# Patient Record
Sex: FEMALE | Race: WHITE | NOT HISPANIC OR LATINO | Employment: FULL TIME | URBAN - NONMETROPOLITAN AREA
[De-identification: names, ages, dates, MRNs, and addresses within clinical notes are randomized per-mention and may not be internally consistent; named-entity substitution may affect disease eponyms.]

---

## 2017-01-18 ENCOUNTER — TELEPHONE (OUTPATIENT)
Dept: FAMILY MEDICINE | Facility: OTHER | Age: 47
End: 2017-01-18

## 2017-01-18 NOTE — TELEPHONE ENCOUNTER
Panel Management Review      Patient has the following on her problem list: None      Composite cancer screening  Chart review shows that this patient is due/due soon for the following Pap Smear  Summary:    Patient is due/failing the following:   LDL and PAP    Action needed:   Patient needs office visit for physical.    Type of outreach:    Phone, left message for patient to call back.     Questions for provider review:    None                                                                                                                                    Michelle RAMIREZ       Chart routed to Care Team .

## 2017-03-10 ENCOUNTER — OFFICE VISIT (OUTPATIENT)
Dept: FAMILY MEDICINE | Facility: OTHER | Age: 47
End: 2017-03-10

## 2017-03-10 VITALS
TEMPERATURE: 97.8 F | OXYGEN SATURATION: 99 % | SYSTOLIC BLOOD PRESSURE: 124 MMHG | WEIGHT: 212 LBS | RESPIRATION RATE: 12 BRPM | HEART RATE: 84 BPM | DIASTOLIC BLOOD PRESSURE: 84 MMHG | BODY MASS INDEX: 34.22 KG/M2

## 2017-03-10 DIAGNOSIS — T14.8XXA SUPERFICIAL FOREIGN BODY (SLIVER): Primary | ICD-10-CM

## 2017-03-10 PROCEDURE — 99213 OFFICE O/P EST LOW 20 MIN: CPT | Performed by: FAMILY MEDICINE

## 2017-03-10 RX ORDER — CYANOCOBALAMIN 1000 UG/ML
INJECTION, SOLUTION INTRAMUSCULAR; SUBCUTANEOUS
Refills: 3 | COMMUNITY
Start: 2017-02-02

## 2017-03-10 RX ORDER — MONTELUKAST SODIUM 4 MG/1
1 TABLET, CHEWABLE ORAL 2 TIMES DAILY
Refills: 3 | COMMUNITY
Start: 2017-03-07

## 2017-03-10 NOTE — MR AVS SNAPSHOT
After Visit Summary   3/10/2017    Mackenzie Worrell    MRN: [id number]           Patient Information     Date Of Birth          1970        Visit Information        Provider Department      3/10/2017 2:50 PM Aneudy Chaudhry MD [facility]        Today's Diagnoses     Superficial foreign body (sliver)    -  1      Care Instructions      Splinter Removal  A splinter has been removed from under your skin. Although care was taken to remove all pieces present, there is a chance that a small piece may have been left behind.  Very small particles that remain under the skin usually cause no problem and need no further treatment unless an infection develops.  You may receive a tetanus shot if needed. You may be given antibiotics to prevent or treat infection based on many factors. Some of these factors include location, severity of injury, time since injury, and other concerns.  Home care  The following guidelines will help you care for your wound at home:    Keep the injured part elevated during the first 2 days to reduce swelling and pain.    Keep the wound clean and dry. If a bandage was applied and it becomes wet or dirty, replace it. Otherwise, leave it in place for the first 24 hours. Then, clean the wound daily:    After removing the bandage, wash the area with soap and water. Use a wet cotton swab to loosen and remove any blood or crust that forms.    After cleaning, apply a thin layer of antibiotic ointment. Put on a fresh bandage.    Unless told otherwise, you may shower as usual, but do not soak the area in water (no baths or swimming) for at least 1 week.    You may use acetaminophen or ibuprofen for pain, unless another pain medicine was given. If you have chronic liver or kidney disease or ever had a stomach ulcer or GI bleeding, talk with your doctor before using these medicines.  Follow-up care  Follow up with your doctor as advised. Most skin wounds heal within 10 days.  However, there is a risk of infection if there is any particle that is still under the skin. Therefore, check the wound in 2 days for the signs of infection listed below. Any stitches should be removed within 7 to 14 days. If surgical tape closures were used, remove them yourself if they have not fallen off after 7 days.  When to seek medical care  Get prompt medical attention if any of the following occur:    Increasing pain in the wound    Redness, swelling, or pus coming from the wound    Fever of 100.4 F (38 C) or higher, or as directed by your health care provider    6010-6124 [employer]. [address] [place] [address], [place] [address]. All rights reserved. This information is not intended as a substitute for professional medical care. Always follow your healthcare professional's instructions.        Splinter Removal  A splinter has been removed from under your skin. Although care was taken to remove all pieces present, there is a chance that a small piece may have been left behind.  Very small particles that remain under the skin usually cause no problem and need no further treatment unless an infection develops.  You may receive a tetanus shot if needed. You may be given antibiotics to prevent or treat infection based on many factors. Some of these factors include location, severity of injury, time since injury, and other concerns.  Home care  The following guidelines will help you care for your wound at home:    Keep the injured part elevated during the first 2 days to reduce swelling and pain.    Keep the wound clean and dry. If a bandage was applied and it becomes wet or dirty, replace it. Otherwise, leave it in place for the first 24 hours. Then, clean the wound daily:    After removing the bandage, wash the area with soap and water. Use a wet cotton swab to loosen and remove any blood or crust that forms.    After cleaning, apply a thin layer of antibiotic ointment. Put on a fresh  bandage.    Unless told otherwise, you may shower as usual, but do not soak the area in water (no baths or swimming) for at least 1 week.    You may use acetaminophen or ibuprofen for pain, unless another pain medicine was given. If you have chronic liver or kidney disease or ever had a stomach ulcer or GI bleeding, talk with your doctor before using these medicines.  Follow-up care  Follow up with your doctor as advised. Most skin wounds heal within 10 days. However, there is a risk of infection if there is any particle that is still under the skin. Therefore, check the wound in 2 days for the signs of infection listed below. Any stitches should be removed within 7 to 14 days. If surgical tape closures were used, remove them yourself if they have not fallen off after 7 days.  When to seek medical care  Get prompt medical attention if any of the following occur:    Increasing pain in the wound    Redness, swelling, or pus coming from the wound    Fever of 100.4 F (38 C) or higher, or as directed by your health care provider    0687-2167 [employer]. [address] [place] [address]. All rights reserved. This information is not intended as a substitute for professional medical care. Always follow your healthcare professional's instructions.              Follow-ups after your visit        Who to contact     If you have questions or need follow up information about today's clinic visit or your schedule please contact [facility] directly at [phone].  Normal or non-critical lab and imaging results will be communicated to you by MyChart, letter or phone within 4 business days after the clinic has received the results. If you do not hear from us within 7 days, please contact the clinic through MyChart or phone. If you have a critical or abnormal lab result, we will notify you by phone as soon as possible.  Submit refill requests through [email or web address] or call your pharmacy and they will  "forward the refill request to us. Please allow 3 business days for your refill to be completed.          Additional Information About Your Visit        MyChart Information     [employer] lets you send messages to your doctor, view your test results, renew your prescriptions, schedule appointments and more. To sign up, go to [email or web address] . Click on \"Log in\" on the left side of the screen, which will take you to the Welcome page. Then click on \"Sign up Now\" on the right side of the page.     You will be asked to enter the access code listed below, as well as some personal information. Please follow the directions to create your username and password.     Your access code is: [email or web address]  Expires: 2017  3:55 PM     Your access code will  in 90 days. If you need help or a new code, please call your [facility] clinic or [phone].        Care EveryWhere ID     This is your [facility] EveryWhere ID. This could be used by other organizations to access your [facility] medical records  [id number]        Your Vitals Were     Pulse Temperature Respirations Pulse Oximetry Breastfeeding? BMI (Body Mass Index)    84 97.8  F (36.6  C) (Tympanic) 12 99% No 34.22 kg/m2       Blood Pressure from Last 3 Encounters:   03/10/17 124/84   10/17/16 120/78   16 134/89    Weight from Last 3 Encounters:   03/10/17 212 lb (96.2 kg)   10/17/16 209 lb (94.8 kg)   16 209 lb 3.2 oz (94.9 kg)              Today, you had the following     No orders found for display       Primary Care Provider Office Phone # Fax #    Sean Krzysztof Alvarenga [phone]       [address] [place] [address]        Thank you!     Thank you for choosing [facility]  for your care. Our goal is always to provide you with excellent care. Hearing back from our patients is one way we can continue to improve our services. Please take a few minutes to complete the written survey that you may " "receive in the mail after your visit with us. Thank you!             Your Updated Medication List - Protect others around you: Learn how to safely use, store and throw away your medicines at www.disposemymeds.org.          This list is accurate as of: 3/10/17  3:55 PM.  Always use your most recent med list.                   Brand Name Dispense Instructions for use    adalimumab 40 MG/0.8ML pen kit    HUMIRA         albuterol (2.5 MG/3ML) 0.083% neb solution     60 vial    Take 1 vial (2.5 mg) by nebulization every 4 hours as needed for shortness of breath / dyspnea or wheezing       calcium carbonate 500 MG tablet    OS- mg [place]. Ca         colestipol 1 G tablet    COLESTID     Take 1 g by mouth 2 times daily       cyanocobalamin 1000 MCG/ML injection    VITAMIN B12     INJECT 1 ML (1,000 MCG TOTAL) INTRAMUSCULARLY EVERY 2 WEEKS       IBUPROFEN PO      Take 400 mg by mouth       loratadine 10 MG capsule      Take 10 mg by mouth daily seasonally       melatonin 3 MG tablet      Take 3 mg by mouth nightly as needed for sleep       Syringe/Needle (Disp) 25G X 5/8\" 3 ML Misc      For use with B12 inj         "

## 2017-03-10 NOTE — PATIENT INSTRUCTIONS
Splinter Removal  A splinter has been removed from under your skin. Although care was taken to remove all pieces present, there is a chance that a small piece may have been left behind.  Very small particles that remain under the skin usually cause no problem and need no further treatment unless an infection develops.  You may receive a tetanus shot if needed. You may be given antibiotics to prevent or treat infection based on many factors. Some of these factors include location, severity of injury, time since injury, and other concerns.  Home care  The following guidelines will help you care for your wound at home:    Keep the injured part elevated during the first 2 days to reduce swelling and pain.    Keep the wound clean and dry. If a bandage was applied and it becomes wet or dirty, replace it. Otherwise, leave it in place for the first 24 hours. Then, clean the wound daily:    After removing the bandage, wash the area with soap and water. Use a wet cotton swab to loosen and remove any blood or crust that forms.    After cleaning, apply a thin layer of antibiotic ointment. Put on a fresh bandage.    Unless told otherwise, you may shower as usual, but do not soak the area in water (no baths or swimming) for at least 1 week.    You may use acetaminophen or ibuprofen for pain, unless another pain medicine was given. If you have chronic liver or kidney disease or ever had a stomach ulcer or GI bleeding, talk with your doctor before using these medicines.  Follow-up care  Follow up with your doctor as advised. Most skin wounds heal within 10 days. However, there is a risk of infection if there is any particle that is still under the skin. Therefore, check the wound in 2 days for the signs of infection listed below. Any stitches should be removed within 7 to 14 days. If surgical tape closures were used, remove them yourself if they have not fallen off after 7 days.  When to seek medical care  Get prompt medical  attention if any of the following occur:    Increasing pain in the wound    Redness, swelling, or pus coming from the wound    Fever of 100.4 F (38 C) or higher, or as directed by your health care provider    4628-1068 [employer]. [address] [place] [address], [place] [address]. All rights reserved. This information is not intended as a substitute for professional medical care. Always follow your healthcare professional's instructions.        Splinter Removal  A splinter has been removed from under your skin. Although care was taken to remove all pieces present, there is a chance that a small piece may have been left behind.  Very small particles that remain under the skin usually cause no problem and need no further treatment unless an infection develops.  You may receive a tetanus shot if needed. You may be given antibiotics to prevent or treat infection based on many factors. Some of these factors include location, severity of injury, time since injury, and other concerns.  Home care  The following guidelines will help you care for your wound at home:    Keep the injured part elevated during the first 2 days to reduce swelling and pain.    Keep the wound clean and dry. If a bandage was applied and it becomes wet or dirty, replace it. Otherwise, leave it in place for the first 24 hours. Then, clean the wound daily:    After removing the bandage, wash the area with soap and water. Use a wet cotton swab to loosen and remove any blood or crust that forms.    After cleaning, apply a thin layer of antibiotic ointment. Put on a fresh bandage.    Unless told otherwise, you may shower as usual, but do not soak the area in water (no baths or swimming) for at least 1 week.    You may use acetaminophen or ibuprofen for pain, unless another pain medicine was given. If you have chronic liver or kidney disease or ever had a stomach ulcer or GI bleeding, talk with your doctor before using these medicines.  Follow-up  care  Follow up with your doctor as advised. Most skin wounds heal within 10 days. However, there is a risk of infection if there is any particle that is still under the skin. Therefore, check the wound in 2 days for the signs of infection listed below. Any stitches should be removed within 7 to 14 days. If surgical tape closures were used, remove them yourself if they have not fallen off after 7 days.  When to seek medical care  Get prompt medical attention if any of the following occur:    Increasing pain in the wound    Redness, swelling, or pus coming from the wound    Fever of 100.4 F (38 C) or higher, or as directed by your health care provider    3876-9858 [employer]. [address] [place] [address], [place] [address]. All rights reserved. This information is not intended as a substitute for professional medical care. Always follow your healthcare professional's instructions.

## 2017-03-10 NOTE — PROGRESS NOTES
SUBJECTIVE:                                                    Mackenzie Worrell is a 46 year old female who presents to clinic today for the following health issues:    Chief Complaint   Patient presents with     Foreign Body in Skin     right hand sliver 2 months ago that broke off when trying to remove it (she works in a wood shop). As of the last week it has been swollen and painful.           Problem list and histories reviewed & adjusted, as indicated.  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     Hc laparoscopy, surgical; cholecystectomy       Cholecystectomy, Laparoscopic     C resect small intest,singl resec/anas       Resection, Partial Small Bowel     C nonspecific procedure       laparoscopy for infertility workup/ adhesions     Hernia repair, incisional  08/13/08     Incarcerated ventral incisional hernia.     Hc colonoscopy w biopsy  05/18/10     Laparoscopic resection small bowel N/A 3/9/2016     Procedure: LAPAROSCOPIC RESECTION SMALL BOWEL;  Surgeon: Mark Francisco MD;  Location: [facility]     Excise mass ear external Right 5/23/2016     Procedure: EXCISE MASS EAR EXTERNAL;  Surgeon: Asif Akhtar MD;  Location: [facility]       Social History   Substance Use Topics     Smoking status: Current Every Day Smoker     Packs/day: 0.75     Years: 25.00     Types: Cigarettes     Smokeless tobacco:  "Never Used      Comment: 1/2 pack daily, started age 20     Alcohol use 0.0 oz/week     0 Standard drinks or equivalent per week      Comment: rare occasion     Family History   Problem Relation Age of Onset     DIABETES Father      Type II     DIABETES Paternal Grandmother      Type II     EYE* Father      Cataract Surgery, Glaucoma     Anesthesia Reaction No family hx of          Current Outpatient Prescriptions   Medication Sig Dispense Refill     colestipol (COLESTID) 1 G tablet Take 1 g by mouth 2 times daily  3     cyanocobalamin (VITAMIN B12) 1000 MCG/ML injection INJECT 1 ML (1,000 MCG TOTAL) INTRAMUSCULARLY EVERY 2 WEEKS  3     calcium carbonate (OS- MG [place]. [place]) 500 MG tablet        Syringe/Needle, Disp, 25G X 5/8\" 3 ML MISC For use with B12 inj       adalimumab (HUMIRA) 40 MG/0.8ML pen kit        albuterol (2.5 MG/3ML) 0.083% nebulizer solution Take 1 vial (2.5 mg) by nebulization every 4 hours as needed for shortness of breath / dyspnea or wheezing 60 vial 0     IBUPROFEN PO Take 400 mg by mouth       melatonin 3 MG tablet Take 3 mg by mouth nightly as needed for sleep       loratadine 10 MG capsule Take 10 mg by mouth daily seasonally       Allergies   Allergen Reactions     No Known Drug Allergies      Recent Labs   Lab Test  10/17/16   1632 04/08/16 03/11/16   0636   03/07/16   1516  12/03/15   1604   ALT  23   --    --    --   18  22   CR  0.79  0.72  0.66   < >  0.84   --    GFRESTIMATED  79   --   >90  Non  GFR Calc     < >  73   --    GFRESTBLACK  >90   GFR Calc     --   >90   GFR Calc     < >  88   --    POTASSIUM  3.7  3.6  3.5   < >  3.8   --     < > = values in this interval not displayed.        Reviewed and updated as needed this visit by clinical staff  Tobacco  Allergies  Meds  Soc Hx      Reviewed and updated as needed this visit by Provider         ROS:  Constitutional, HEENT, cardiovascular, pulmonary, gi and gu systems are " negative, except as otherwise noted.    OBJECTIVE:                                                    /84 (BP Location: Left arm, Patient Position: Chair, Cuff Size: Adult Regular)  Pulse 84  Temp 97.8  F (36.6  C) (Tympanic)  Resp 12  Wt 212 lb (96.2 kg)  SpO2 99%  Breastfeeding? No  BMI 34.22 kg/m2  Body mass index is 34.22 kg/(m^2).  GENERAL: healthy, alert and no distress  MS: SKIN: There is a opening in the skin of her right palm without a palpable/visualized foreign body. Over riding callous removed This was explored with a 18 gauge needle and fine tip forceps. No foreign body removed but small amount of purulent discharge expressed..    Diagnostic Test Results:  none      ASSESSMENT/PLAN:                                                      1. Superficial foreign body (sliver)  Suspect small insignificant retained FB in the deeper tissue. Over riding callous removed This was explored with a 18 gauge needle and fine tip forceps. No foreign body removed but small amount of purulent discharge expressed. Anticipate this will continue to work itself to surface, Soak in warm soapy water 15-20 min twice daily.       Patient Instructions     Splinter Removal  A splinter has been removed from under your skin. Although care was taken to remove all pieces present, there is a chance that a small piece may have been left behind.  Very small particles that remain under the skin usually cause no problem and need no further treatment unless an infection develops.  You may receive a tetanus shot if needed. You may be given antibiotics to prevent or treat infection based on many factors. Some of these factors include location, severity of injury, time since injury, and other concerns.  Home care  The following guidelines will help you care for your wound at home:    Keep the injured part elevated during the first 2 days to reduce swelling and pain.    Keep the wound clean and dry. If a bandage was applied and it becomes  wet or dirty, replace it. Otherwise, leave it in place for the first 24 hours. Then, clean the wound daily:    After removing the bandage, wash the area with soap and water. Use a wet cotton swab to loosen and remove any blood or crust that forms.    After cleaning, apply a thin layer of antibiotic ointment. Put on a fresh bandage.    Unless told otherwise, you may shower as usual, but do not soak the area in water (no baths or swimming) for at least 1 week.    You may use acetaminophen or ibuprofen for pain, unless another pain medicine was given. If you have chronic liver or kidney disease or ever had a stomach ulcer or GI bleeding, talk with your doctor before using these medicines.  Follow-up care  Follow up with your doctor as advised. Most skin wounds heal within 10 days. However, there is a risk of infection if there is any particle that is still under the skin. Therefore, check the wound in 2 days for the signs of infection listed below. Any stitches should be removed within 7 to 14 days. If surgical tape closures were used, remove them yourself if they have not fallen off after 7 days.  When to seek medical care  Get prompt medical attention if any of the following occur:    Increasing pain in the wound    Redness, swelling, or pus coming from the wound    Fever of 100.4 F (38 C) or higher, or as directed by your health care provider    1410-8108 [employer]. [address] [place], [place] [id number]. All rights reserved. This information is not intended as a substitute for professional medical care. Always follow your healthcare professional's instructions.        Splinter Removal  A splinter has been removed from under your skin. Although care was taken to remove all pieces present, there is a chance that a small piece may have been left behind.  Very small particles that remain under the skin usually cause no problem and need no further treatment unless an infection develops.  You may  receive a tetanus shot if needed. You may be given antibiotics to prevent or treat infection based on many factors. Some of these factors include location, severity of injury, time since injury, and other concerns.  Home care  The following guidelines will help you care for your wound at home:    Keep the injured part elevated during the first 2 days to reduce swelling and pain.    Keep the wound clean and dry. If a bandage was applied and it becomes wet or dirty, replace it. Otherwise, leave it in place for the first 24 hours. Then, clean the wound daily:    After removing the bandage, wash the area with soap and water. Use a wet cotton swab to loosen and remove any blood or crust that forms.    After cleaning, apply a thin layer of antibiotic ointment. Put on a fresh bandage.    Unless told otherwise, you may shower as usual, but do not soak the area in water (no baths or swimming) for at least 1 week.    You may use acetaminophen or ibuprofen for pain, unless another pain medicine was given. If you have chronic liver or kidney disease or ever had a stomach ulcer or GI bleeding, talk with your doctor before using these medicines.  Follow-up care  Follow up with your doctor as advised. Most skin wounds heal within 10 days. However, there is a risk of infection if there is any particle that is still under the skin. Therefore, check the wound in 2 days for the signs of infection listed below. Any stitches should be removed within 7 to 14 days. If surgical tape closures were used, remove them yourself if they have not fallen off after 7 days.  When to seek medical care  Get prompt medical attention if any of the following occur:    Increasing pain in the wound    Redness, swelling, or pus coming from the wound    Fever of 100.4 F (38 C) or higher, or as directed by your health care provider    1679-3178 [employer]. [address] [place] [address], [place] [address]. All rights reserved. This information is not  intended as a substitute for professional medical care. Always follow your healthcare professional's instructions.            Aneudy Chaudhry MD  [facility]

## 2017-04-11 ENCOUNTER — TRANSFERRED RECORDS (OUTPATIENT)
Dept: HEALTH INFORMATION MANAGEMENT | Facility: CLINIC | Age: 47
End: 2017-04-11

## 2017-05-19 ENCOUNTER — OFFICE VISIT (OUTPATIENT)
Dept: FAMILY MEDICINE | Facility: OTHER | Age: 47
End: 2017-05-19

## 2017-05-19 VITALS
SYSTOLIC BLOOD PRESSURE: 122 MMHG | WEIGHT: 218 LBS | DIASTOLIC BLOOD PRESSURE: 82 MMHG | TEMPERATURE: 97.1 F | HEIGHT: 66 IN | HEART RATE: 72 BPM | BODY MASS INDEX: 35.03 KG/M2 | OXYGEN SATURATION: 99 %

## 2017-05-19 DIAGNOSIS — K50.80 CROHN'S DISEASE OF BOTH SMALL AND LARGE INTESTINE WITHOUT COMPLICATION (H): ICD-10-CM

## 2017-05-19 DIAGNOSIS — E66.01 MORBID OBESITY DUE TO EXCESS CALORIES (H): ICD-10-CM

## 2017-05-19 DIAGNOSIS — S60.551D: ICD-10-CM

## 2017-05-19 DIAGNOSIS — Z90.49 S/P COLECTOMY: ICD-10-CM

## 2017-05-19 DIAGNOSIS — M19.90 INFLAMMATORY ARTHRITIS: ICD-10-CM

## 2017-05-19 DIAGNOSIS — Z13.6 CARDIOVASCULAR SCREENING; LDL GOAL LESS THAN 160: Primary | ICD-10-CM

## 2017-05-19 DIAGNOSIS — J45.20 MILD INTERMITTENT ASTHMA WITHOUT COMPLICATION: ICD-10-CM

## 2017-05-19 PROCEDURE — 99214 OFFICE O/P EST MOD 30 MIN: CPT | Performed by: INTERNAL MEDICINE

## 2017-05-19 RX ORDER — CLOBETASOL PROPIONATE 0.5 MG/G
OINTMENT TOPICAL 2 TIMES DAILY
COMMUNITY
End: 2020-11-20

## 2017-05-19 ASSESSMENT — PAIN SCALES - GENERAL: PAINLEVEL: MILD PAIN (3)

## 2017-05-19 NOTE — NURSING NOTE
"Chief Complaint   Patient presents with     Foreign Body in Skin       Initial /82 (BP Location: Left arm, Patient Position: Chair, Cuff Size: Adult Regular)  Pulse 72  Temp 97.1  F (36.2  C) (Temporal)  Ht 5' 6\" (1.676 m)  Wt 218 lb (98.9 kg)  SpO2 99%  BMI 35.19 kg/m2 Estimated body mass index is 35.19 kg/(m^2) as calculated from the following:    Height as of this encounter: 5' 6\" (1.676 m).    Weight as of this encounter: 218 lb (98.9 kg).  Medication Reconciliation: complete  Health Maintenance reviewed at today's visit patient asked to schedule/complete:   Cervical Cancer:  Patient agrees to schedule   Michelle RAMIREZ      "

## 2017-05-19 NOTE — PROGRESS NOTES
"Chief Complaint   Patient presents with     Foreign Body in Skin     CHIEF COMPLAINT:    The patient presents today for evaluation of a foreign body/splinter in her right hand at the palm are metacarpal phalangeal joint of the fifth digit. Apparently, the sliver/foreign body was obtained about 6 months ago. 2 months ago, she saw Dr. Chaudhry and he was able to open up a little bit but did not find any foreign body. It was significantly callused with some underlying drainage. It improved slightly but has now recurred. I do not believe that I will be able to \"find the sliver\" based on the induration and inflammation. Additionally, given that it was a wooden sliver, x-ray confirmation of its size and location will not be possible.   The patient would very much like this to be surgically corrected. I believe it would be in her best interest to set her up with an actual surgeon. I will refer the patient to orthopedic surgery as they have far more experience than I do in this particular matter.  With respect to her Crohn's disease, she is post colectomy and doing well. She continues her Humira and has had no ill effect. She's had no substantial abdominal pain or gastrointestinal bleeding. She also has a history of some inflammatory joint disease associated with her Crohn's disease, this is currently controlled with use of Humira as well. Return to work will be due in the near future. I will have her come in fasting so that lipid panel may be obtained as well.                         PAST, FAMILY,SOCIAL HISTORY:     Medical  History:   has a past medical history of Contact dermatitis and other eczema, due to unspecified cause; Mild intermittent asthma; Osteoarthrosis, unspecified whether generalized or localized, unspecified site; Regional enteritis of small intestine with large intestine (H); Synovitis and tenosynovitis, unspecified; and Tobacco use disorder.     Surgical History:   has a past surgical history that " "includes REMOVAL OF TONSILS,<12 Y/O; LAPAROSCOPY, SURGICAL; CHOLECYSTECTOMY; RESECT SMALL INTEST,SINGL RESEC/ANAS; NONSPECIFIC PROCEDURE; hernia repair, incisional (08/13/08); COLONOSCOPY W BIOPSY (05/18/10); Laparoscopic resection small bowel (N/A, 3/9/2016); and Excise mass ear external (Right, 5/23/2016).     Social History:   reports that she has been smoking Cigarettes.  She has a 18.75 pack-year smoking history. She has never used smokeless tobacco. She reports that she drinks alcohol. She reports that she does not use illicit drugs.     Family History:  family history includes DIABETES in her father and paternal grandmother; EYE* in her father. There is no history of Anesthesia Reaction.            MEDICATIONS  Current Outpatient Prescriptions   Medication Sig Dispense Refill     clobetasol (TEMOVATE) 0.05 % ointment Apply topically 2 times daily       colestipol (COLESTID) 1 G tablet Take 1 g by mouth 2 times daily  3     cyanocobalamin (VITAMIN B12) 1000 MCG/ML injection INJECT 1 ML (1,000 MCG TOTAL) INTRAMUSCULARLY EVERY 2 WEEKS  3     calcium carbonate (OS- MG [place]. [place]) 500 MG tablet        Syringe/Needle, Disp, 25G X 5/8\" 3 ML MISC For use with B12 inj       adalimumab (HUMIRA) 40 MG/0.8ML pen kit        albuterol (2.5 MG/3ML) 0.083% nebulizer solution Take 1 vial (2.5 mg) by nebulization every 4 hours as needed for shortness of breath / dyspnea or wheezing 60 vial 0     IBUPROFEN PO Take 400 mg by mouth       melatonin 3 MG tablet Take 3 mg by mouth nightly as needed for sleep       loratadine 10 MG capsule Take 10 mg by mouth daily seasonally           --------------------------------------------------------------------------------------------------------------------                          REVIEW OF SYSTEMS:       LUNGS: Pt denies: cough,excess sputum, hemoptysis, or shortness of breath.   HEART: Pt denies: chest pain, arrythmia, syncope, tachy or bradyarrhythmia or excess edema.   SKIN: Pt " "denies: itching, rashes, discoloration, or other lesions of concern. Denies recent hair loss.                          EXAMINATION:         /82 (BP Location: Left arm, Patient Position: Chair, Cuff Size: Adult Regular)  Pulse 72  Temp 97.1  F (36.2  C) (Temporal)  Ht 5' 6\" (1.676 m)  Wt 218 lb (98.9 kg)  SpO2 99%  BMI 35.19 kg/m2   SKIN:  warm and dry. No erythema, or rashes are noted. No specific lesions of concern are noted.   The area of callused/indurated/swollen foreign body in her hand is as described.   LUNGS: clear bilaterally, airflow is brisk, no intercostal retraction or stridor is noted. No coughing is noted during visit.   HEART:  regular without rubs, clicks, gallops, or murmurs. PMI is nondisplaced. Upstrokes are brisk. S1,S2 are heard.   GI: Abdomen is soft, without rebound, guarding or tenderness. Bowel sounds are appropriate. No renal bruits are heard.    MS: Minimal crepitance is noted in the extremities. No deformity is present. Muscle strength is appropriate and equal bilaterally. No acute joint erythema or swelling is present.                        DECISION MAKIN. Foreign body, hand, superficial, right, subsequent encounter  Will not attempt to blindly remove a 6-month-old piece of wood from her hand at this time  - ORTHOPEDICS ADULT REFERRAL    2. Mild intermittent asthma without complication  Stable  - Asthma Action Plan (AAP)    3. Crohn's disease of both small and large intestine without complication (H)  Stable with Humira  - **CBC with platelets FUTURE 1yr; Future  - Hepatic panel; Future    4. S/P colectomy  Minimal discomfort    5. Inflammatory arthritis  Currently controlled with Humira- **CBC with platelets FUTURE 1yr; Future  - Hepatic panel; Future    6. CARDIOVASCULAR SCREENING; LDL GOAL LESS THAN 160    - **Lipid panel reflex to direct LDL FUTURE 1yr; Future      And of course morbid obesity                             FOLLOW UP    I have asked the patient " to make an appointment for follow up with me in the upcoming month for Pap smear examination and fasting lipid levels.            I have carefully explained the diagnosis and treatment options with the patient. The patient has displayed an understanding of the above, and all subsequent questions were answered.         DO TAMARA Jean-Baptiste    Portions of this note were produced using [employer]  Although every attempt at real-time proof reading has been made, occasional grammar/syntax errors may have been missed.

## 2017-05-19 NOTE — LETTER
My Asthma Action Plan  Name: Mackenzie Worrell   YOB: 1970  Date: 5/19/2017   My doctor: Sean Alvarenga, DO   My clinic: [facility]        My Control Medicine:   My Rescue Medicine:    My Asthma Severity:   Avoid your asthma triggers:                GREEN ZONE     Good Control    I feel good    No cough or wheeze    Can work, sleep and play without asthma symptoms       Take your asthma control medicine every day.     1. If exercise triggers your asthma, take your rescue medication    15 minutes before exercise or sports, and    During exercise if you have asthma symptoms  2. Spacer to use with inhaler: If you have a spacer, make sure to use it with your inhaler             YELLOW ZONE     Getting Worse  I have ANY of these:    I do not feel good    Cough or wheeze    Chest feels tight    Wake up at night   1. Keep taking your Green Zone medications  2. Start taking your rescue medicine:    every 20 minutes for up to 1 hour. Then every 4 hours for 24-48 hours.  3. If you stay in the Yellow Zone for more than 12-24 hours, contact your doctor.  4. If you do not return to the Green Zone in 12-24 hours or you get worse, start taking your oral steroid medicine if prescribed by your provider.           RED ZONE     Medical Alert - Get Help  I have ANY of these:    I feel awful    Medicine is not helping    Breathing getting harder    Trouble walking or talking    Nose opens wide to breathe       1. Take your rescue medicine NOW  2. If your provider has prescribed an oral steroid medicine, start taking it NOW  3. Call your doctor NOW  4. If you are still in the Red Zone after 20 minutes and you have not reached your doctor:    Take your rescue medicine again and    Call 911 or go to the emergency room right away    See your regular doctor within 2 weeks of an Emergency Room or Urgent Care visit for follow-up treatment.        Electronically signed by: Michelle Nguyen, May 19,  2017    Annual Reminders:  Meet with Asthma Educator,  Flu Shot in the Fall, consider Pneumonia Vaccination for patients with asthma (aged 19 and older).    Pharmacy[facility]    [id number] - [place], [place] - [address] [facility]  [facility] - [place], [place] - [address]  [facility] - [place], [place] - [address]                    Asthma Triggers  How To Control Things That Make Your Asthma Worse    Triggers are things that make your asthma worse.  Look at the list below to help you find your triggers and what you can do about them.  You can help prevent asthma flare-ups by staying away from your triggers.      Trigger                                                          What you can do   Cigarette Smoke  Tobacco smoke can make asthma worse. Do not allow smoking in your home, car or around you.  Be sure no one smokes at a child s day care or school.  If you smoke, ask your health care provider for ways to help you quit.  Ask family members to quit too.  Ask your health care provider for a referral to Quit Plan to help you quit smoking, or call 8-266-696-PLAN.     Colds, Flu, Bronchitis  These are common triggers of asthma. Wash your hands often.  Don t touch your eyes, nose or mouth.  Get a flu shot every year.     Dust Mites  These are tiny bugs that live in cloth or carpet. They are too small to see. Wash sheets and blankets in hot water every week.   Encase pillows and mattress in dust mite proof covers.  Avoid having carpet if you can. If you have carpet, vacuum weekly.   Use a dust mask and HEPA vacuum.   Pollen and Outdoor Mold  Some people are allergic to trees, grass, or weed pollen, or molds. Try to keep your windows closed.  Limit time out doors when pollen count is high.   Ask you health care provider about taking medicine during allergy season.     Animal Dander  Some people are allergic to skin flakes, urine or saliva from pets with fur or feathers. Keep pets with fur or feathers out  of your home.    If you can t keep the pet outdoors, then keep the pet out of your bedroom.  Keep the bedroom door closed.  Keep pets off cloth furniture and away from stuffed toys.     Mice, Rats, and Cockroaches  Some people are allergic to the waste from these pests.   Cover food and garbage.  Clean up spills and food crumbs.  Store grease in the refrigerator.   Keep food out of the bedroom.   Indoor Mold  This can be a trigger if your home has high moisture. Fix leaking faucets, pipes, or other sources of water.   Clean moldy surfaces.  Dehumidify basement if it is damp and smelly.   Smoke, Strong Odors, and Sprays  These can reduce air quality. Stay away from strong odors and sprays, such as perfume, powder, hair spray, paints, smoke incense, paint, cleaning products, candles and new carpet.   Exercise or Sports  Some people with asthma have this trigger. Be active!  Ask your doctor about taking medicine before sports or exercise to prevent symptoms.    Warm up for 5-10 minutes before and after sports or exercise.     Other Triggers of Asthma  Cold air:  Cover your nose and mouth with a scarf.  Sometimes laughing or crying can be a trigger.  Some medicines and food can trigger asthma.

## 2017-05-19 NOTE — MR AVS SNAPSHOT
After Visit Summary   5/19/2017    Mackenzie Worrell    MRN: [id number]           Patient Information     Date Of Birth          1970        Visit Information        Provider Department      5/19/2017 1:00 PM Sean Alvarenga DO; NL PROC ROOM, [facility]        Today's Diagnoses     CARDIOVASCULAR SCREENING; LDL GOAL LESS THAN 160    -  1    Foreign body, hand, superficial, right, subsequent encounter        Mild intermittent asthma without complication        Crohn's disease of both small and large intestine without complication (H)        S/P colectomy        Inflammatory arthritis        Morbid obesity due to excess calories (H)           Follow-ups after your visit        Additional Services     ORTHOPEDICS ADULT REFERRAL       Your provider has referred you to: [facility]: [facility] [phone]   [email or web address]    Please be aware that coverage of these services is subject to the terms and limitations of your health insurance plan.  Call member services at your health plan with any benefit or coverage questions.      Please bring the following to your appointment:    >>   Any x-rays, CTs or MRIs which have been performed.  Contact the facility where they were done to arrange for  prior to your scheduled appointment.    >>   List of current medications   >>   This referral request   >>   Any documents/labs given to you for this referral                  Your next 10 appointments already scheduled     May 22, 2017  3:20 PM CDT   [facility]    [address] [place] [id number] [phone]              Who to contact     If you have questions or need follow up information about today's clinic visit or your schedule please contact [facility] directly at [phone].  Normal or non-critical  "lab and imaging results will be communicated to you by MyChart, letter or phone within 4 business days after the clinic has received the results. If you do not hear from us within 7 days, please contact the clinic through [employer] or phone. If you have a critical or abnormal lab result, we will notify you by phone as soon as possible.  Submit refill requests through [employer] or call your pharmacy and they will forward the refill request to us. Please allow 3 business days for your refill to be completed.          Additional Information About Your Visit        [employer] Information     [employer] lets you send messages to your doctor, view your test results, renew your prescriptions, schedule appointments and more. To sign up, go to [email or web address] . Click on \"Log in\" on the left side of the screen, which will take you to the Welcome page. Then click on \"Sign up Now\" on the right side of the page.     You will be asked to enter the access code listed below, as well as some personal information. Please follow the directions to create your username and password.     Your access code is: [email or web address]  Expires: 2017  4:55 PM     Your access code will  in 90 days. If you need help or a new code, please call your [place] clinic or [phone].        Care EveryWhere ID     This is your Care EveryWhere ID. This could be used by other organizations to access your [place] medical records  [id number]        Your Vitals Were     Pulse Temperature Height Pulse Oximetry BMI (Body Mass Index)       72 97.1  F (36.2  C) (Temporal) 5' 6\" (1.676 m) 99% 35.19 kg/m2        Blood Pressure from Last 3 Encounters:   17 122/82   03/10/17 124/84   10/17/16 120/78    Weight from Last 3 Encounters:   17 218 lb (98.9 kg)   03/10/17 212 lb (96.2 kg)   10/17/16 209 lb (94.8 kg)              We Performed the Following     Asthma Action Plan (AAP)     ORTHOPEDICS ADULT REFERRAL        Primary Care Provider Office Phone " "# Fax Maddy Alvarenga [phone]       [address]   [facility] [place] [address]        Thank you!     Thank you for choosing [facility]  for your care. Our goal is always to provide you with excellent care. Hearing back from our patients is one way we can continue to improve our services. Please take a few minutes to complete the written survey that you may receive in the mail after your visit with us. Thank you!             Your Updated Medication List - Protect others around you: Learn how to safely use, store and throw away your medicines at www.disposemymeds.org.          This list is accurate as of: 5/19/17 11:59 PM.  Always use your most recent med list.                   Brand Name Dispense Instructions for use    adalimumab 40 MG/0.8ML pen kit    HUMIRA         albuterol (2.5 MG/3ML) 0.083% neb solution     60 vial    Take 1 vial (2.5 mg) by nebulization every 4 hours as needed for shortness of breath / dyspnea or wheezing       calcium carbonate 500 MG tablet    OS- mg [place]. Ca         clobetasol 0.05 % ointment    TEMOVATE     Apply topically 2 times daily       colestipol 1 G tablet    COLESTID     Take 1 g by mouth 2 times daily       cyanocobalamin 1000 MCG/ML injection    VITAMIN B12     INJECT 1 ML (1,000 MCG TOTAL) INTRAMUSCULARLY EVERY 2 WEEKS       IBUPROFEN PO      Take 400 mg by mouth       loratadine 10 MG capsule      Take 10 mg by mouth daily seasonally       melatonin 3 MG tablet      Take 3 mg by mouth nightly as needed for sleep       Syringe/Needle (Disp) 25G X 5/8\" 3 ML Misc      For use with B12 inj         "

## 2017-05-20 ASSESSMENT — ASTHMA QUESTIONNAIRES: ACT_TOTALSCORE: 23

## 2017-05-21 PROBLEM — E66.01 MORBID OBESITY DUE TO EXCESS CALORIES (H): Status: ACTIVE | Noted: 2017-05-21

## 2017-05-22 ENCOUNTER — OFFICE VISIT (OUTPATIENT)
Dept: ORTHOPEDICS | Facility: CLINIC | Age: 47
End: 2017-05-22
Payer: OTHER MISCELLANEOUS

## 2017-05-22 VITALS — BODY MASS INDEX: 35.03 KG/M2 | TEMPERATURE: 97.7 F | WEIGHT: 218 LBS | HEIGHT: 66 IN

## 2017-05-22 DIAGNOSIS — S60.551S: Primary | ICD-10-CM

## 2017-05-22 PROCEDURE — 99203 OFFICE O/P NEW LOW 30 MIN: CPT | Performed by: ORTHOPAEDIC SURGERY

## 2017-05-22 ASSESSMENT — PAIN SCALES - GENERAL: PAINLEVEL: MILD PAIN (3)

## 2017-05-22 NOTE — LETTER
5/22/2017       RE: Mackenzie Worrell  [address] [facility] [address]           Dear Colleague,    Thank you for referring your patient, Mackenzie Worrell, to the [facility]. Please see a copy of my visit note below.    ORTHOPEDIC CLINIC CONSULT      Mackenzie Worrell is a 46 year old female who is seen in consultation at the request of Dr. Alvarenga.  History of Present illness:  Mackenzie presents for evaluation of:   1.) Foreign body in right hand    Onset:  December 2016    Symptoms brought on by Sliver in hand, sliver came out about 2 weeks ago.  In march Dr. Chaudhry opened it up but could not find anything.   Location:  Right hand.    Character:  dull ache and swelling.    Progression of symptoms:  worse.    Previous similar pain: no .   Pain Level:  3/10.   Previous treatments:  nothing.  Currently on Blood thinners? No  Diagnosis of Diabetes? No            ORTHOPEDIC CONSULT    Chief Complaint: Mackenzie Worrell is a 46 year old female who is seen in consultation for     Chief Complaint   Patient presents with     Consult       History of Present Illness:  The above intake note was reviewed.  The patient is very certain that some time in December she got a piece of wood in her hand while at work. It penetrated at the base of the little finger to the right hand.   She attempted to remove the splinter on her own. She had Dr. Chaudhry attempt to do it in March of this year without success.  Since that point in time she has had episodes of drainage. 2 weeks ago they were able to remove what appeared to be a splinter of wood. The save the splinter and are able to show it to me today. I would concur that this looks to be a splinter of wood.    However, since 2 weeks ago she has still been struggling with a swollen very firm and sensitive area on the volar surface of her hand at the base to the right little finger.    She denies any loss of range of motion. She  "denies any sensory change in the hand.    She is accompanied by her  today.    Prior history of related problems: She does have ulcerative colitis. She takes Humira.    Patient's past medical, surgical, social and family histories reviewed.     Past Medical History:   Diagnosis Date     Contact dermatitis and other eczema, due to unspecified cause     dishydrotic eczema/hands     Mild intermittent asthma      Osteoarthrosis, unspecified whether generalized or localized, unspecified site     arthritis     Regional enteritis of small intestine with large intestine (H)     Crohn's disease     Synovitis and tenosynovitis, unspecified     wrist tendonitis     Tobacco use disorder        Past Surgical History:   Procedure Laterality Date     C NONSPECIFIC PROCEDURE      laparoscopy for infertility workup/ adhesions     C RESECT SMALL INTEST,SINGL RESEC/ANAS      Resection, Partial Small Bowel     EXCISE MASS EAR EXTERNAL Right 5/23/2016    Procedure: EXCISE MASS EAR EXTERNAL;  Surgeon: Asif Akhtar MD;  Location: [facility] OR     HC COLONOSCOPY W BIOPSY  05/18/10     HC LAPAROSCOPY, SURGICAL; CHOLECYSTECTOMY      Cholecystectomy, Laparoscopic     HC REMOVAL OF TONSILS,<12 Y/O      Tonsils <13y.o.     HERNIA REPAIR, INCISIONAL  08/13/08    Incarcerated ventral incisional hernia.     LAPAROSCOPIC RESECTION SMALL BOWEL N/A 3/9/2016    Procedure: LAPAROSCOPIC RESECTION SMALL BOWEL;  Surgeon: Mark Francisco MD;  Location: [facility] OR       Medications:    Current Outpatient Prescriptions on File Prior to Visit:  clobetasol (TEMOVATE) 0.05 % ointment Apply topically 2 times daily   colestipol (COLESTID) 1 G tablet Take 1 g by mouth 2 times daily   cyanocobalamin (VITAMIN B12) 1000 MCG/ML injection INJECT 1 ML (1,000 MCG TOTAL) INTRAMUSCULARLY EVERY 2 WEEKS   calcium carbonate (OS- MG [place]. CA) 500 MG tablet    Syringe/Needle, Disp, 25G X 5/8\" 3 ML MISC For use with B12 inj   adalimumab (HUMIRA) 40 MG/0.8ML pen " "kit    albuterol (2.5 MG/3ML) 0.083% nebulizer solution Take 1 vial (2.5 mg) by nebulization every 4 hours as needed for shortness of breath / dyspnea or wheezing   IBUPROFEN PO Take 400 mg by mouth   melatonin 3 MG tablet Take 3 mg by mouth nightly as needed for sleep   loratadine 10 MG capsule Take 10 mg by mouth daily seasonally     Current Facility-Administered Medications on File Prior to Visit:  albuterol (PROAIR HFA, PROVENTIL HFA, VENTOLIN HFA) inhaler       Allergies   Allergen Reactions     No Known Drug Allergies        Social History     Occupational History     home  Self [employer]     Social History Main Topics     Smoking status: Current Every Day Smoker     Packs/day: 0.75     Years: 25.00     Types: Cigarettes     Smokeless tobacco: Never Used      Comment: 1/2 pack daily, started age 20     Alcohol use 0.0 oz/week     0 Standard drinks or equivalent per week      Comment: rare occasion     Drug use: No     Sexual activity: Yes     Partners: Male     Birth control/ protection: Surgical       Family History   Problem Relation Age of Onset     DIABETES Father      Type II     DIABETES Paternal Grandmother      Type II     EYE* Father      Cataract Surgery, Glaucoma     Anesthesia Reaction No family hx of        REVIEW OF SYSTEMS    General: negative for, night sweats, dizziness, fatigue  Resp: No shortness of breath and no cough  CV: negative for chest pain, syncope or near-syncope  GI: negative for nausea, vomiting and diarrhea  : negative for dysuria and hematuria  Musculoskeletal: as above  Neurologic: negative for syncope   Hematologic: negative for bleeding disorder    Physical Exam:    Vitals: Temp 97.7  F (36.5  C) (Temporal)  Ht 1.676 m (5' 6\")  Wt 98.9 kg (218 lb)  BMI 35.19 kg/m2  BMI= Body mass index is 35.19 kg/(m^2).    GENERAL APPEARANCE:  Healthy, alert, no distress    SKIN: She looks as though she may have eczema lesions on her ankles and forearms.    NEURO:  Normal strength " and tone, mentation intact and speech normal    PSYCH:   Mentation appears normal and affect normal/bright    RESPIRATORY:  No respiratory distress.    EYES:  No Conjunctivitis    Cardiovascular:  Edema: no                radial Present                Fingers warm and well perfused, brisk capillary refill.      GAIT: non-antalgic    JOINT/EXTREMITIES:    There is no limitation to range of motion of her digits or wrist.    Visually there is a fullness on the volar surface of her right hand at the base of the little finger.  This appears to include some degree of soft tissue swelling and some degree of callus. The callus might actually be quite thickened.  In the center of the area there is some breakdown of the superficial callus. Within the depths of this breakdown there are small punctate changes that may represent the capillary seen at the base of a wart.      Radiographs: X-rays of her hands were reviewed. There is no evidence of foreign body or soft tissue calcification.    Independent visualization of the images was performed.    Impression:     ICD-10-CM    1. Acute foreign body of hand, right, sequela base of little finger S60.551S        Clearly there was a small foreign body at the base of her little finger. This came out 2 weeks ago. They bring it in today as evidence.    The remaining swelling may be reactive with overlying callus that simply has not resolved.  It may also represent her having developed a wart.  She has reached a level of frustration given a swelling and discomfort associated with the use of her hand.        Plan:  The above was reviewed with Mackenzie    We make sure she understands the multiple thoughts were having as to the diagnosis.   Taking aggressive approach to this makes reasonable signs. I personally would shave down the superficial layers of the skin to try to verify whether or not this represents something such as a wart. Once this was either verified or refuted then I would  dissect deeper to look for some form of residual foreign body. If the appearance is that of a residual wart then excision of such would be accomplished.     Recovery following this would very depending on the findings.   They're willing to take these risks today.   The prefer this be done under local. She was explained that there are some limitations to doing it under pure local but they still prefer this approach. We will try and set this up for them in a timely fashion.       Return to clinic 10 days postop.       Please schedule for surgery, pre op H&P, and post ops.      Patient Name:  Mackenzie Worrell ([id number]).  :  1970  Gender:  female  Patient Type:  Same Day Surgery  Surgeon:  Benjamin Vital MD  Physician requests assist from:  None    Procedures:    Exploration and excision/removal foreign body palmar days of right little finger.    Approach:  NA  Diagnosis:  Acute foreign body of hand, right, sequela  C-arm:  No  Mini C-arm:   No  Pathology Scheduled:  No  Special instruments/supplies:  None  Vendor Rep:  None  Anesthesia:  Local anesthetic utilizing 2% lidocaine with epinephrine.  Block:  No   Block type:   Time needed:  30 minutes    [facility] Discussed:  Not Applicable    Post op 1:              Benjamin Vital MD        Again, thank you for allowing me to participate in the care of your patient.        Sincerely,              Benjamin Vital MD

## 2017-05-22 NOTE — NURSING NOTE
"Chief Complaint   Patient presents with     Consult       Initial Temp 97.7  F (36.5  C) (Temporal)  Ht 1.676 m (5' 6\")  Wt 98.9 kg (218 lb)  BMI 35.19 kg/m2 Estimated body mass index is 35.19 kg/(m^2) as calculated from the following:    Height as of this encounter: 1.676 m (5' 6\").    Weight as of this encounter: 98.9 kg (218 lb).  Medication Reconciliation: complete   ASHLEY Mahmood  "

## 2017-05-22 NOTE — MR AVS SNAPSHOT
"              After Visit Summary   2017    Mackenzie Worrell    MRN: [id number]           Patient Information     Date Of Birth          1970        Visit Information        Provider Department      2017 3:20 PM Benjamin Vital MD [facility]         Follow-ups after your visit        Who to contact     If you have questions or need follow up information about today's clinic visit or your schedule please contact [facility] directly at [phone].  Normal or non-critical lab and imaging results will be communicated to you by MyChart, letter or phone within 4 business days after the clinic has received the results. If you do not hear from us within 7 days, please contact the clinic through [employer] or phone. If you have a critical or abnormal lab result, we will notify you by phone as soon as possible.  Submit refill requests through [email or web address] or call your pharmacy and they will forward the refill request to us. Please allow 3 business days for your refill to be completed.          Additional Information About Your Visit        [facility] Information     [email or web address] lets you send messages to your doctor, view your test results, renew your prescriptions, schedule appointments and more. To sign up, go to [email or web address] . Click on \"Log in\" on the left side of the screen, which will take you to the Welcome page. Then click on \"Sign up Now\" on the right side of the page.     You will be asked to enter the access code listed below, as well as some personal information. Please follow the directions to create your username and password.     Your access code is: [email or web address]  Expires: 2017  4:55 PM     Your access code will  in 90 days. If you need help or a new code, please call your [facility] or [phone].        Care EveryWhere ID     This is your Care EveryWhere ID. This could be used by other organizations to access your [place] medical " "records  [id number]        Your Vitals Were     Temperature Height BMI (Body Mass Index)             97.7  F (36.5  C) (Temporal) 1.676 m (5' 6\") 35.19 kg/m2          Blood Pressure from Last 3 Encounters:   05/19/17 122/82   03/10/17 124/84   10/17/16 120/78    Weight from Last 3 Encounters:   05/22/17 98.9 kg (218 lb)   05/19/17 98.9 kg (218 lb)   03/10/17 96.2 kg (212 lb)              Today, you had the following     No orders found for display       Primary Care Provider Office Phone # Fax #    Sean Rusheugenein,  [phone]       [address] [place] [address]        Thank you!     Thank you for choosing [facility]  for your care. Our goal is always to provide you with excellent care. Hearing back from our patients is one way we can continue to improve our services. Please take a few minutes to complete the written survey that you may receive in the mail after your visit with us. Thank you!             Your Updated Medication List - Protect others around you: Learn how to safely use, store and throw away your medicines at www.disposemymeds.org.          This list is accurate as of: 5/22/17  3:38 PM.  Always use your most recent med list.                   Brand Name Dispense Instructions for use    adalimumab 40 MG/0.8ML pen kit    HUMIRA         albuterol (2.5 MG/3ML) 0.083% neb solution     60 vial    Take 1 vial (2.5 mg) by nebulization every 4 hours as needed for shortness of breath / dyspnea or wheezing       calcium carbonate 500 MG tablet    OS- mg [place]. Ca         clobetasol 0.05 % ointment    TEMOVATE     Apply topically 2 times daily       colestipol 1 G tablet    COLESTID     Take 1 g by mouth 2 times daily       cyanocobalamin 1000 MCG/ML injection    VITAMIN B12     INJECT 1 ML (1,000 MCG TOTAL) INTRAMUSCULARLY EVERY 2 WEEKS       IBUPROFEN PO      Take 400 mg by mouth       loratadine 10 MG capsule      Take 10 mg by " "mouth daily seasonally       melatonin 3 MG tablet      Take 3 mg by mouth nightly as needed for sleep       Syringe/Needle (Disp) 25G X 5/8\" 3 ML Misc      For use with B12 inj         "

## 2017-05-22 NOTE — PROGRESS NOTES
ORTHOPEDIC CLINIC CONSULT      Mackenzie Worrell is a 46 year old female who is seen in consultation at the request of Dr. Alvarenga.  History of Present illness:  Mackenzie presents for evaluation of:   1.) Foreign body in right hand    Onset:  December 2016    Symptoms brought on by Sliver in hand, sliver came out about 2 weeks ago.  In march Dr. Chaudhry opened it up but could not find anything.   Location:  Right hand.    Character:  dull ache and swelling.    Progression of symptoms:  worse.    Previous similar pain: no .   Pain Level:  3/10.   Previous treatments:  nothing.  Currently on Blood thinners? No  Diagnosis of Diabetes? No            ORTHOPEDIC CONSULT    Chief Complaint: Mackenzie Worrell is a 46 year old female who is seen in consultation for     Chief Complaint   Patient presents with     Consult       History of Present Illness:  The above intake note was reviewed.  The patient is very certain that some time in December she got a piece of wood in her hand while at work. It penetrated at the base of the little finger to the right hand.   She attempted to remove the splinter on her own. She had Dr. Chaudhry attempt to do it in March of this year without success.  Since that point in time she has had episodes of drainage. 2 weeks ago they were able to remove what appeared to be a splinter of wood. The save the splinter and are able to show it to me today. I would concur that this looks to be a splinter of wood.    However, since 2 weeks ago she has still been struggling with a swollen very firm and sensitive area on the volar surface of her hand at the base to the right little finger.    She denies any loss of range of motion. She denies any sensory change in the hand.    She is accompanied by her  today.    Prior history of related problems: She does have ulcerative colitis. She takes Humira.    Patient's past medical, surgical, social and family histories reviewed.     Past Medical  "History:   Diagnosis Date     Contact dermatitis and other eczema, due to unspecified cause     dishydrotic eczema/hands     Mild intermittent asthma      Osteoarthrosis, unspecified whether generalized or localized, unspecified site     arthritis     Regional enteritis of small intestine with large intestine (H)     Crohn's disease     Synovitis and tenosynovitis, unspecified     wrist tendonitis     Tobacco use disorder        Past Surgical History:   Procedure Laterality Date     C NONSPECIFIC PROCEDURE      laparoscopy for infertility workup/ adhesions     C RESECT SMALL INTEST,SINGL RESEC/ANAS      Resection, Partial Small Bowel     EXCISE MASS EAR EXTERNAL Right 5/23/2016    Procedure: EXCISE MASS EAR EXTERNAL;  Surgeon: Asif Akhtar MD;  Location: [facility] OR     HC COLONOSCOPY W BIOPSY  05/18/10     HC LAPAROSCOPY, SURGICAL; CHOLECYSTECTOMY      Cholecystectomy, Laparoscopic     HC REMOVAL OF TONSILS,<12 Y/O      Tonsils <13y.o.     HERNIA REPAIR, INCISIONAL  08/13/08    Incarcerated ventral incisional hernia.     LAPAROSCOPIC RESECTION SMALL BOWEL N/A 3/9/2016    Procedure: LAPAROSCOPIC RESECTION SMALL BOWEL;  Surgeon: Mark Francisco MD;  Location: [facility] OR       Medications:    Current Outpatient Prescriptions on File Prior to Visit:  clobetasol (TEMOVATE) 0.05 % ointment Apply topically 2 times daily   colestipol (COLESTID) 1 G tablet Take 1 g by mouth 2 times daily   cyanocobalamin (VITAMIN B12) 1000 MCG/ML injection INJECT 1 ML (1,000 MCG TOTAL) INTRAMUSCULARLY EVERY 2 WEEKS   calcium carbonate (OS- MG [place]. [place]) 500 MG tablet    Syringe/Needle, Disp, 25G X 5/8\" 3 ML MISC For use with B12 inj   adalimumab (HUMIRA) 40 MG/0.8ML pen kit    albuterol (2.5 MG/3ML) 0.083% nebulizer solution Take 1 vial (2.5 mg) by nebulization every 4 hours as needed for shortness of breath / dyspnea or wheezing   IBUPROFEN PO Take 400 mg by mouth   melatonin 3 MG tablet Take 3 mg by mouth nightly as needed for " "sleep   loratadine 10 MG capsule Take 10 mg by mouth daily seasonally     Current Facility-Administered Medications on File Prior to Visit:  albuterol (PROAIR HFA, PROVENTIL HFA, VENTOLIN HFA) inhaler       Allergies   Allergen Reactions     No Known Drug Allergies        Social History     Occupational History     home  Self [employer]     Social History Main Topics     Smoking status: Current Every Day Smoker     Packs/day: 0.75     Years: 25.00     Types: Cigarettes     Smokeless tobacco: Never Used      Comment: 1/2 pack daily, started age 20     Alcohol use 0.0 oz/week     0 Standard drinks or equivalent per week      Comment: rare occasion     Drug use: No     Sexual activity: Yes     Partners: Male     Birth control/ protection: Surgical       Family History   Problem Relation Age of Onset     DIABETES Father      Type II     DIABETES Paternal Grandmother      Type II     EYE* Father      Cataract Surgery, Glaucoma     Anesthesia Reaction No family hx of        REVIEW OF SYSTEMS    General: negative for, night sweats, dizziness, fatigue  Resp: No shortness of breath and no cough  CV: negative for chest pain, syncope or near-syncope  GI: negative for nausea, vomiting and diarrhea  : negative for dysuria and hematuria  Musculoskeletal: as above  Neurologic: negative for syncope   Hematologic: negative for bleeding disorder    Physical Exam:    Vitals: Temp 97.7  F (36.5  C) (Temporal)  Ht 1.676 m (5' 6\")  Wt 98.9 kg (218 lb)  BMI 35.19 kg/m2  BMI= Body mass index is 35.19 kg/(m^2).    GENERAL APPEARANCE:  Healthy, alert, no distress    SKIN: She looks as though she may have eczema lesions on her ankles and forearms.    NEURO:  Normal strength and tone, mentation intact and speech normal    PSYCH:   Mentation appears normal and affect normal/bright    RESPIRATORY:  No respiratory distress.    EYES:  No Conjunctivitis    Cardiovascular:  Edema: no                radial Present                Fingers warm " and well perfused, brisk capillary refill.      GAIT: non-antalgic    JOINT/EXTREMITIES:    There is no limitation to range of motion of her digits or wrist.    Visually there is a fullness on the volar surface of her right hand at the base of the little finger.  This appears to include some degree of soft tissue swelling and some degree of callus. The callus might actually be quite thickened.  In the center of the area there is some breakdown of the superficial callus. Within the depths of this breakdown there are small punctate changes that may represent the capillary seen at the base of a wart.      Radiographs: X-rays of her hands were reviewed. There is no evidence of foreign body or soft tissue calcification.    Independent visualization of the images was performed.    Impression:     ICD-10-CM    1. Acute foreign body of hand, right, sequela base of little finger S60.551S        Clearly there was a small foreign body at the base of her little finger. This came out 2 weeks ago. They bring it in today as evidence.    The remaining swelling may be reactive with overlying callus that simply has not resolved.  It may also represent her having developed a wart.  She has reached a level of frustration given a swelling and discomfort associated with the use of her hand.        Plan:  The above was reviewed with Mackenzie    We make sure she understands the multiple thoughts were having as to the diagnosis.   Taking aggressive approach to this makes reasonable signs. I personally would shave down the superficial layers of the skin to try to verify whether or not this represents something such as a wart. Once this was either verified or refuted then I would dissect deeper to look for some form of residual foreign body. If the appearance is that of a residual wart then excision of such would be accomplished.     Recovery following this would very depending on the findings.   They're willing to take these risks today.    The prefer this be done under local. She was explained that there are some limitations to doing it under pure local but they still prefer this approach. We will try and set this up for them in a timely fashion.       Return to clinic 10 days postop.       Please schedule for surgery, pre op H&P, and post ops.      Patient Name:  Mackenzie Worrell ([id number]).  :  1970  Gender:  female  Patient Type:  Same Day Surgery  Surgeon:  Benjamin Vital MD  Physician requests assist from:  None    Procedures:    Exploration and excision/removal foreign body palmar days of right little finger.    Approach:  NA  Diagnosis:  Acute foreign body of hand, right, sequela  C-arm:  No  Mini C-arm:   No  Pathology Scheduled:  No  Special instruments/supplies:  None  Vendor Rep:  None  Anesthesia:  Local anesthetic utilizing 2% lidocaine with epinephrine.  Block:  No   Block type:   Time needed:  30 minutes    [facility] Discussed:  Not Applicable    Post op 1:              Benjamin Vital MD

## 2017-05-23 ENCOUNTER — TELEPHONE (OUTPATIENT)
Dept: ORTHOPEDICS | Facility: CLINIC | Age: 47
End: 2017-05-23

## 2017-05-23 DIAGNOSIS — S60.551A: Primary | ICD-10-CM

## 2017-05-23 NOTE — TELEPHONE ENCOUNTER
Attempted to schedule patient when she was in clinic on 5/22/17, Dr. Vital suggested surgery be done on 5/24/17. At that time patient was unsure if this was going to be billed as Work Comp or not and needed to speak with her boss. Patient called this morning and said that this is work comp and everything has been filled out. Patient doesn't have anyone assigned to her case and doesn't have a claim number. I told patient that I didn't think it would be possible to have surgery as soon as tomorrow since we don't have all of her work comp information. Patient was going to talk to her boss because she would like to schedule this ASAP. States she will call back.

## 2017-05-23 NOTE — TELEPHONE ENCOUNTER
Surgery Scheduled    Date of Surgery 5/31/17 Time of Surgery   Procedure: Exploration and excision/removal foreign body  Hospital/Surgical Facility: [facility]  Surgeon: Dr. Vital  Type of Anesthesia : Local  Pre-op na with   Post op:6/13/17 with Dr. Vital        Surgery packet mailed to patient's home address. Patients instructed to arrive 1 hours prior to surgery. Patient understood and agrees to plan.    Rubi Lyn  Surgery Scheduler

## 2017-05-26 ENCOUNTER — HEALTH MAINTENANCE LETTER (OUTPATIENT)
Age: 47
End: 2017-05-26

## 2017-05-31 ENCOUNTER — HOSPITAL ENCOUNTER (OUTPATIENT)
Facility: CLINIC | Age: 47
Discharge: HOME | End: 2017-05-31
Attending: ORTHOPAEDIC SURGERY | Admitting: ORTHOPAEDIC SURGERY
Payer: OTHER MISCELLANEOUS

## 2017-05-31 VITALS
RESPIRATION RATE: 18 BRPM | HEART RATE: 96 BPM | HEIGHT: 66 IN | WEIGHT: 218 LBS | TEMPERATURE: 98.3 F | BODY MASS INDEX: 35.03 KG/M2 | SYSTOLIC BLOOD PRESSURE: 126 MMHG | DIASTOLIC BLOOD PRESSURE: 85 MMHG | OXYGEN SATURATION: 99 %

## 2017-05-31 DIAGNOSIS — S60.551D FOREIGN BODY IN HAND, RIGHT, SUBSEQUENT ENCOUNTER: Primary | ICD-10-CM

## 2017-05-31 PROCEDURE — 71000027 ZZH RECOVERY PHASE 2 EACH 15 MINS: Performed by: ORTHOPAEDIC SURGERY

## 2017-05-31 PROCEDURE — 36000058 ZZH SURGERY LEVEL 3 EA 15 ADDTL MIN: Performed by: ORTHOPAEDIC SURGERY

## 2017-05-31 PROCEDURE — 36000056 ZZH SURGERY LEVEL 3 1ST 30 MIN: Performed by: ORTHOPAEDIC SURGERY

## 2017-05-31 PROCEDURE — 27210794 ZZH OR GENERAL SUPPLY STERILE: Performed by: ORTHOPAEDIC SURGERY

## 2017-05-31 PROCEDURE — 11420 EXC H-F-NK-SP B9+MARG 0.5/<: CPT | Mod: RT | Performed by: ORTHOPAEDIC SURGERY

## 2017-05-31 PROCEDURE — 40000306 ZZH STATISTIC PRE PROC ASSESS II: Performed by: ORTHOPAEDIC SURGERY

## 2017-05-31 PROCEDURE — 25000125 ZZHC RX 250: Performed by: ORTHOPAEDIC SURGERY

## 2017-05-31 PROCEDURE — 88305 TISSUE EXAM BY PATHOLOGIST: CPT | Performed by: ORTHOPAEDIC SURGERY

## 2017-05-31 PROCEDURE — 88305 TISSUE EXAM BY PATHOLOGIST: CPT | Mod: 26 | Performed by: ORTHOPAEDIC SURGERY

## 2017-05-31 RX ORDER — CEFAZOLIN SODIUM 2 G/100ML
2 INJECTION, SOLUTION INTRAVENOUS
Status: DISCONTINUED | OUTPATIENT
Start: 2017-05-31 | End: 2017-05-31 | Stop reason: HOSPADM

## 2017-05-31 RX ORDER — OXYCODONE HYDROCHLORIDE 5 MG/1
5-10 TABLET ORAL
Qty: 30 TABLET | Refills: 0 | Status: SHIPPED | OUTPATIENT
Start: 2017-05-31 | End: 2017-06-13

## 2017-05-31 RX ORDER — CEFAZOLIN SODIUM 1 G/3ML
1 INJECTION, POWDER, FOR SOLUTION INTRAMUSCULAR; INTRAVENOUS SEE ADMIN INSTRUCTIONS
Status: DISCONTINUED | OUTPATIENT
Start: 2017-05-31 | End: 2017-05-31 | Stop reason: HOSPADM

## 2017-05-31 RX ORDER — AMOXICILLIN 250 MG
1-2 CAPSULE ORAL 2 TIMES DAILY
Qty: 30 TABLET | Refills: 0 | Status: SHIPPED | OUTPATIENT
Start: 2017-05-31 | End: 2018-04-24

## 2017-05-31 RX ORDER — OXYCODONE HYDROCHLORIDE 5 MG/1
5-10 TABLET ORAL
Status: DISCONTINUED | OUTPATIENT
Start: 2017-05-31 | End: 2017-05-31 | Stop reason: HOSPADM

## 2017-05-31 RX ORDER — ACETAMINOPHEN 325 MG/1
650 TABLET ORAL
Status: DISCONTINUED | OUTPATIENT
Start: 2017-05-31 | End: 2017-05-31 | Stop reason: HOSPADM

## 2017-05-31 NOTE — OP NOTE
PREOPERATIVE DIAGNOSIS:  Suspected foreign body, ulnar aspect right hand; possible wart.      POSTOPERATIVE DIAGNOSIS:  Suspected foreign body, ulnar aspect right hand; possible wart.      PROCEDURE:     1.  Exploration of wound ulnar palmar right hand with excision of 1 possible verruca.   2.  Suspected foreign body reaction.     3.  Primary closure.      DATE OF PROCEDURE:   05/31/2017      SURGEON:  Benjamin Vital MD      ANESTHESIA:  2% local with epinephrine.      INDICATIONS:  Ms. Mackenzie Worrell is a 46-year-old woman who 5 months ago while at work had a foreign body enter the ulnar aspect of her right hand.  Over the ensuing months, she went through efforts to remove the foreign body and even had it attempted to be surgically done in the office by her primary physician.  Despite these efforts there continued to be drainage and callus formation.  When seen in the office, the concern was that she may still have a retained foreign body and over the distal aspect of this area, there were changes that might have been representative of a wart.      With the chronic nature of this circumstance exploration was felt to be warranted.  She understood the rationale, risks and benefits and consent was obtained.  The area was marked.      DETAILS OF PROCEDURE:  The patient was brought to the operating room, placed supine on the table.  Upper arm tourniquet was applied, but we did not use it.  Right arm was then prepped and draped in the standard fashion for hand surgery.  A timeout protocol was followed.      We performed a field block utilizing 2% lidocaine with epinephrine.  Once we were certain the medication was working we proceeded.      The thickened callus that was present was debrided.  This was mainly over the distal portion of this oblique area.  This brought us down into the region that we thought represented a verruca.  We could not verify that this was or was not that diagnostic consideration.       Proximal and in an oblique fashion from the distal callus we had just worked on was a small punctate area that had continued to drain.  We initially probed this with the hemostat attempting to identify foreign body which was not the case.  At this point, in order to fully visualize we then created an incision that went from the distal area we described to the proximal sinus region.  We then bluntly dissected  We encountered a beige colored longitudinal tissue that we attempted to enucleate but was too friable to accomplish.  We removed it in a piecemeal fashion, but as we removed it, it was apparent that this was a tubular structure.  We tried to identify residual foreign body within this tubular structure but this was not possible.  The distal portion of the wound, where we thought the verruca might be sitting there was a clump of tissue that was taken out en leander.  These tissues were sent as separate structures.  This one was labeled the verruca;  the other was labeled the suspected foreign body reaction.      Once all the above obvious tissue was removed we dissected removing any thickened tissue that did not have normal appearance.  Once we had fully accomplished this dissection and clean up the wound was irrigated.      We were very superficial to the area where the sensory nerves maybe.  Therefore, we did not have concern that these were at risk.      We then debrided the edges of this wound that we had created.  This left us with an elliptical type incision approximately 1.5 cm long and at least 5-6 mm wide.  Fortunately once the edges were debrided, we could perform side-to-side closure quite easily and this was accomplished with 3-0 nylon.      We then dressed the wound with Xeroform, 4 x 4 and sterile Webril.  Coban was finally used to hold it all in position.  The patient was awake during the entire case, and she tolerated this very well.         JAI WHITLOCK MD             D: 05/31/2017 10:47   T:  2017 13:07   MT: [id number]      Name:     SONNY SHEETS   MRN:      [id number]        Account:        [id number]   :      1970           Procedure Date: 2017      Document: [id number]

## 2017-05-31 NOTE — DISCHARGE INSTRUCTIONS
[facility] Orthopedic Discharge Instructions For Finger/hand Surgery    Call the Bone and Joint Service Line for after-surgery issues:    [phone]  Pain Control: New home prescriptions Oxycodone 5 mg tablet:  Take one or two tablets every 4-6 hours as needed for pain.    Take your pain medications as prescribed. These medications may make you sleepy. Do not drive, operate equipment, or drink alcohol when taking these.  You may take Tylenol (Generic name is acetaminophen) as directed on the bottle for additional relief or in place of the prescribed pain medications as your pain gets better.  Ibuprofen or Aleve can be used in supplement to the pain prescription and Tylenol if you need. If the medications cause a reaction such as nausea or skin rash, stop taking them and contact your doctor.  Please plan accordingly, pain medications will not be re-filled on the weekends or at night.  Call the office during the day if you need more medications.   Wound Care/Dressings Remove the dressings in three days, then cover the wounds with a Band-Aid.  You may change the Band- Aid daily.  Do not soak your hand in water.  This usually means you must cover the hand with a glove for bathing.     Activity You may use your hand for light activity.   I encourage you to move your fingers.    Do not lift anything heavy.  Keep your hand higher than your heart.  It will minimize swelling.   Diet Regular.   When to Call the Office Temperature greater than 101.5 degrees Fahrenheit.  Increasing pain not relieved by medicine, elevation and icing.  Any issues with your dressing.   Follow up Appointment This should have already been med for you.  If not call the office and make an appointment for 7-10 days after surgery.

## 2017-05-31 NOTE — BRIEF OP NOTE
[facility] Orthopedic Brief Operative Note    Pre-operative diagnosis: acute foreign body of right hand    Post-operative diagnosis: Same   Procedure: Procedure(s):  REMOVE FOREIGN BODY HAND right little finger   Surgeon: Benjamin Vital MD   Assistant(s): None   Anesthesia: Local anesthesia   Estimated blood loss: Less than 10 ml

## 2017-05-31 NOTE — BRIEF OP NOTE
[facility] Orthopedic Brief Operative Note    Pre-operative diagnosis: acute foreign body of right hand sequela possible wart   Post-operative diagnosis: Same   Procedure: Procedure(s):  REMOVE FOREIGN BODY HAND   Surgeon: Benjamin Vital MD   Assistant(s): None   Anesthesia: Local anesthesia   Estimated blood loss: Less than 10 ml   Total IV fluids: (See anesthesia record)   Drains: None   Specimens: 2   Implants: See op note   Findings:    Complications: None   Weight bearing status: Non-weight bearing   Comments: See dictated operative report for full details

## 2017-05-31 NOTE — IP AVS SNAPSHOT
[facility] Phase II    [address]     [place] [address]    Phone:  [phone]                                       After Visit Summary   5/31/2017    Mackenzie Worrell    MRN: [id number]           After Visit Summary Signature Page     I have received my discharge instructions, and my questions have been answered. I have discussed any challenges I see with this plan with the nurse or doctor.    ..........................................................................................................................................  Patient/Patient Representative Signature      ..........................................................................................................................................  Patient Representative Print Name and Relationship to Patient    ..................................................               ................................................  Date                                            Time    ..........................................................................................................................................  Reviewed by Signature/Title    ...................................................              ..............................................  Date                                                            Time

## 2017-05-31 NOTE — IP AVS SNAPSHOT
MRN:[id number]                      After Visit Summary   5/31/2017    Mackenzie Worrell    MRN: [id number]           Thank you!     Thank you for choosing [facility] for your care. Our goal is always to provide you with excellent care. Hearing back from our patients is one way we can continue to improve our services. Please take a few minutes to complete the written survey that you may receive in the mail after you visit with us. Thank you!        Patient Information     Date Of Birth          1970        About your hospital stay     You were admitted on:  May 31, 2017 You last received care in the:  [facility] Phase II    You were discharged on:  May 31, 2017       Who to Call     For medical emergencies, please call 911.  For non-urgent questions about your medical care, please call your primary care provider or clinic, [phone]  For questions related to your surgery, please call your surgery clinic        Attending Provider     Provider Specialty    Benjamin Vital MD Orthopedics       Primary Care Provider Office Phone # Fax #    Tancnigj Krzysztof Alvarenga,  [phone]      After Care Instructions      Diet as Tolerated       Return to diet before surgery, unless instructed otherwise.            Discharge Instructions       Review outpatient procedure discharge instructions with patient as directed by Provider            Discharge Instructions - Lifting Limit (specify)       No lifting with operative digit for 4-6 weeks            Dressing Change       Change dressing on third day after surgery.  Replace with standard bandages.  OK to shower over sutures.  Do not immerse in water.            Ice to affected area       Ice pack to surgical site every 15 minutes per hour for 24 hours            Remove dressing - at 72 hours           Return to clinic       Return to clinic in 10 days - 2 weeks.  Appointment would have been made pre-operatively.             Shower        Cover dressing if dressing is not going to be changed today            Wound care       Do not immerse wound in water until sutures removed                  Your next 10 appointments already scheduled     Jun 13, 2017  4:30 PM CDT   Return Visit with Benjamin Vital MD   [facility]    [address] [place] [id number] [phone]              Further instructions from your care team         [facility] Orthopedic Discharge Instructions For Finger/hand Surgery    Call the Bone and Joint Service Line for after-surgery issues:    [phone]  Pain Control: New home prescriptions Oxycodone 5 mg tablet:  Take one or two tablets every 4-6 hours as needed for pain.    Take your pain medications as prescribed. These medications may make you sleepy. Do not drive, operate equipment, or drink alcohol when taking these.  You may take Tylenol (Generic name is acetaminophen) as directed on the bottle for additional relief or in place of the prescribed pain medications as your pain gets better.  Ibuprofen or Aleve can be used in supplement to the pain prescription and Tylenol if you need. If the medications cause a reaction such as nausea or skin rash, stop taking them and contact your doctor.  Please plan accordingly, pain medications will not be re-filled on the weekends or at night.  Call the office during the day if you need more medications.   Wound Care/Dressings Remove the dressings in three days, then cover the wounds with a Band-Aid.  You may change the Band- Aid daily.  Do not soak your hand in water.  This usually means you must cover the hand with a glove for bathing.     Activity You may use your hand for light activity.   I encourage you to move your fingers.    Do not lift anything heavy.  Keep your hand higher than your heart.  It will minimize swelling.   Diet Regular.   When to Call the Office Temperature greater  "than 101.5 degrees Fahrenheit.  Increasing pain not relieved by medicine, elevation and icing.  Any issues with your dressing.   Follow up Appointment This should have already been med for you.  If not call the office and make an appointment for 7-10 days after surgery.           Pending Results     Date and Time Order Name Status Description    2017 1017 Surgical pathology exam In process             Admission Information     Date & Time Provider Department Dept. Phone    2017 Benjamin Vital MD [facility] Phase [phone]      Your Vitals Were     Blood Pressure Pulse Temperature Respirations Height Weight    120/78 96 98.3  F (36.8  C) (Oral) 18 1.676 m (5' 5.98\") 98.9 kg (218 lb)    Last Period Pulse Oximetry BMI (Body Mass Index)             2017 99% 35.2 kg/m2         [employer] Information     [employer] lets you send messages to your doctor, view your test results, renew your prescriptions, schedule appointments and more. To sign up, go to [email or web address] . Click on \"Log in\" on the left side of the screen, which will take you to the Welcome page. Then click on \"Sign up Now\" on the right side of the page.     You will be asked to enter the access code listed below, as well as some personal information. Please follow the directions to create your username and password.     Your access code is: [email or web address]  Expires: 2017  4:55 PM     Your access code will  in 90 days. If you need help or a new code, please call your [facility] clinic or [phone].        Care EveryWhere ID     This is your Care EveryWhere ID. This could be used by other organizations to access your [facility] medical records  [id number]           Review of your medicines      UNREVIEWED medicines. Ask your doctor about these medicines        Dose / Directions    adalimumab 40 MG/0.8ML pen kit   Commonly known as:  HUMIRA        Refills:  0       albuterol (2.5 MG/3ML) 0.083% neb solution   Used " "for:  Acute bronchitis with coexisting condition requiring prophylactic treatment        Dose:  1 vial   Take 1 vial (2.5 mg) by nebulization every 4 hours as needed for shortness of breath / dyspnea or wheezing   Quantity:  60 vial   Refills:  0       calcium carbonate 500 MG tablet   Commonly known as:  OS- mg [place]. [place]        Refills:  0       clobetasol 0.05 % ointment   Commonly known as:  TEMOVATE        Apply topically 2 times daily   Refills:  0       colestipol 1 G tablet   Commonly known as:  COLESTID        Dose:  1 g   Take 1 g by mouth 2 times daily   Refills:  3       cyanocobalamin 1000 MCG/ML injection   Commonly known as:  VITAMIN B12        INJECT 1 ML (1,000 MCG TOTAL) INTRAMUSCULARLY EVERY 2 WEEKS   Refills:  3       IBUPROFEN PO        Dose:  400 mg   Take 400 mg by mouth   Refills:  0       loratadine 10 MG capsule        Dose:  10 mg   Take 10 mg by mouth daily seasonally   Refills:  0       melatonin 3 MG tablet        Dose:  3 mg   Take 3 mg by mouth nightly as needed for sleep   Refills:  0         START taking        Dose / Directions    oxyCODONE 5 MG IR tablet   Commonly known as:  ROXICODONE   Used for:  Foreign body in hand, right, subsequent encounter        Dose:  5-10 mg   Take 1-2 tablets (5-10 mg) by mouth every 3 hours as needed for pain or other (Moderate to Severe)   Quantity:  30 tablet   Refills:  0       senna-docusate 8.6-50 MG per tablet   Commonly known as:  SENOKOT-S;PERICOLACE   Used for:  Foreign body in hand, right, subsequent encounter        Dose:  1-2 tablet   Take 1-2 tablets by mouth 2 times daily Take while on oral narcotics to prevent or treat constipation.   Quantity:  30 tablet   Refills:  0         CONTINUE these medicines which have NOT CHANGED        Dose / Directions    Syringe/Needle (Disp) 25G X 5/8\" 3 ML Misc        For use with B12 inj   Refills:  0            Where to get your medicines      Some of these will need a paper prescription and " others can be bought over the counter. Ask your nurse if you have questions.     Bring a paper prescription for each of these medications     oxyCODONE 5 MG IR tablet    senna-docusate 8.6-50 MG per tablet                Protect others around you: Learn how to safely use, store and throw away your medicines at www.disposemymeds.org.             Medication List: This is a list of all your medications and when to take them. Check marks below indicate your daily home schedule. Keep this list as a reference.      Medications           Morning Afternoon Evening Bedtime As Needed    adalimumab 40 MG/0.8ML pen kit   Commonly known as:  HUMIRA                                albuterol (2.5 MG/3ML) 0.083% neb solution   Take 1 vial (2.5 mg) by nebulization every 4 hours as needed for shortness of breath / dyspnea or wheezing                                calcium carbonate 500 MG tablet   Commonly known as:  OS- mg [place]. [place]                                clobetasol 0.05 % ointment   Commonly known as:  TEMOVATE   Apply topically 2 times daily                                colestipol 1 G tablet   Commonly known as:  COLESTID   Take 1 g by mouth 2 times daily                                cyanocobalamin 1000 MCG/ML injection   Commonly known as:  VITAMIN B12   INJECT 1 ML (1,000 MCG TOTAL) INTRAMUSCULARLY EVERY 2 WEEKS                                IBUPROFEN PO   Take 400 mg by mouth                                loratadine 10 MG capsule   Take 10 mg by mouth daily seasonally                                melatonin 3 MG tablet   Take 3 mg by mouth nightly as needed for sleep                                oxyCODONE 5 MG IR tablet   Commonly known as:  ROXICODONE   Take 1-2 tablets (5-10 mg) by mouth every 3 hours as needed for pain or other (Moderate to Severe)                                senna-docusate 8.6-50 MG per tablet   Commonly known as:  SENOKOT-S;PERICOLACE   Take 1-2 tablets by mouth 2 times daily Take  "while on oral narcotics to prevent or treat constipation.                                Syringe/Needle (Disp) 25G X 5/8\" 3 ML Misc   For use with B12 inj                                  "

## 2017-06-01 LAB — COPATH REPORT: NORMAL

## 2017-06-13 ENCOUNTER — OFFICE VISIT (OUTPATIENT)
Dept: ORTHOPEDICS | Facility: CLINIC | Age: 47
End: 2017-06-13
Payer: OTHER MISCELLANEOUS

## 2017-06-13 VITALS — HEIGHT: 66 IN | TEMPERATURE: 97.6 F

## 2017-06-13 DIAGNOSIS — Z48.89 POSTOPERATIVE VISIT: Primary | ICD-10-CM

## 2017-06-13 DIAGNOSIS — M60.20 FOREIGN BODY REACTION: ICD-10-CM

## 2017-06-13 PROCEDURE — 99212 OFFICE O/P EST SF 10 MIN: CPT | Performed by: ORTHOPAEDIC SURGERY

## 2017-06-13 ASSESSMENT — PAIN SCALES - GENERAL: PAINLEVEL: NO PAIN (0)

## 2017-06-13 NOTE — NURSING NOTE
"Chief Complaint   Patient presents with     RECHECK     Exploration and excision/removal foreign body palmar days right little finger.  DOS:5/31/17 (13 days postop)     Surgical Followup     PREOPERATIVE DIAGNOSIS:  Suspected foreign body, ulnar aspect right hand; possible wart. POSTOPERATIVE DIAGNOSIS:  Suspected foreign body, ulnar aspect right hand; possible wart. PROCEDURE:  1.  Exploration of wound ulnar palmar right hand with excision of 1 possible verruca.2.  Suspected foreign body reaction.  3.  Primary closure.        Initial Temp 97.6  F (36.4  C) (Temporal)  Ht 1.676 m (5' 5.98\")  LMP 05/14/2017 Estimated body mass index is 35.2 kg/(m^2) as calculated from the following:    Height as of 5/31/17: 1.676 m (5' 5.98\").    Weight as of 5/31/17: 98.9 kg (218 lb).  Medication Reconciliation: complete   ASHLEY Mahmood  "

## 2017-06-13 NOTE — PROGRESS NOTES
"HISTORY OF PRESENT ILLNESS:    Mackenzie Worrell is a 46 year old female who is seen in follow up for   Chief Complaint   Patient presents with     RECHECK     Exploration and excision/removal foreign body palmar days right little finger.  DOS:5/31/17 (13 days postop)     Surgical Followup     PREOPERATIVE DIAGNOSIS:  Suspected foreign body, ulnar aspect right hand; possible wart. POSTOPERATIVE DIAGNOSIS:  Suspected foreign body, ulnar aspect right hand; possible wart. PROCEDURE:  1.  Exploration of wound ulnar palmar right hand with excision of 1 possible verruca.2.  Suspected foreign body reaction.  3.  Primary closure.      Work Comp     Present symptoms: Patient has no unusual complaints at this time. She personally thinks that the area looks very good. She recognizes that the skin that is already performed may be sensitive.  Treatments tried to this point: First postsurgical visit.    PHYSICAL EXAM:  Temp 97.6  F (36.4  C) (Temporal)  Ht 1.676 m (5' 5.98\")  LMP 05/14/2017  There is no height or weight on file to calculate BMI.       Physical Exam:  Vitals: Temp 97.6  F (36.4  C) (Temporal)  Ht 1.676 m (5' 5.98\")  LMP 05/14/2017  BMI= There is no height or weight on file to calculate BMI.  Constitutional: healthy, alert and no acute distress   Psychiatric: mentation appears normal and affect normal/bright    JOINT/EXTREMITIES:  NEURO: no focal deficits  Affected extremity pulses are easily palpable.  SKIN: no excoriation or erythema. No signs of infection.    Sutures were removed.      Swelling: Very little swelling  Bruising: No bruising  ROM: Although she does not have complete range of motion of the little finger it is very near normal.  The wound itself is very healthy-appearing and appears to have closed very nicely.      IMAGING INTERPRETATION:  No x-rays were obtained.    Pathology report was reviewed and shared with the patient.  There was no evidence to suggest that she had a wart. It appears " she was having reaction to the previous foreign body.     ASSESSMENT:    ICD-10-CM    1. Postoperative visit Z48.89    2. Foreign body reaction right hand. M60.20        Status post debridement of foreign body reaction right hand at the base little finger. She is doing very well.    PLAN:      I don't think she is going to need physical therapy.  She will work on range of motion on her own.  She will add back more aggressive activities as she feels she can tolerate.    Return to Work:  Because the wound is healed we discussed returning to work. We both agree that returning at the end of this week for a single day and then having the weekend to recover should work out well.  Therefore return to work was written for this Friday, June 16.    We then discuss whether not she needs to return to see us. Again we agree that that's not really necessary unless she has issues.    Because this is a work-related injury I told her I would not complete paperwork for 2 months. Therefore if there is an issue she should contact us immediately. Otherwise, I will complete the paperwork anticipating that she is recovered fully and does not have disability.    Return to clinic MADONNA Vital MD

## 2017-06-13 NOTE — LETTER
"  6/13/2017       RE: Mackenzie Worrell  [address] [facility] [address]           Dear Colleague,    Thank you for referring your patient, Mackenzie Worrell, to the [facility]. Please see a copy of my visit note below.    HISTORY OF PRESENT ILLNESS:    Mackenzie Worrell is a 46 year old female who is seen in follow up for   Chief Complaint   Patient presents with     RECHECK     Exploration and excision/removal foreign body palmar days right little finger.  DOS:5/31/17 (13 days postop)     Surgical Followup     PREOPERATIVE DIAGNOSIS:  Suspected foreign body, ulnar aspect right hand; possible wart. POSTOPERATIVE DIAGNOSIS:  Suspected foreign body, ulnar aspect right hand; possible wart. PROCEDURE:  1.  Exploration of wound ulnar palmar right hand with excision of 1 possible verruca.2.  Suspected foreign body reaction.  3.  Primary closure.      Work Comp     Present symptoms: Patient has no unusual complaints at this time. She personally thinks that the area looks very good. She recognizes that the skin that is already performed may be sensitive.  Treatments tried to this point: First postsurgical visit.    PHYSICAL EXAM:  Temp 97.6  F (36.4  C) (Temporal)  Ht 1.676 m (5' 5.98\")  LMP 05/14/2017  There is no height or weight on file to calculate BMI.       Physical Exam:  Vitals: Temp 97.6  F (36.4  C) (Temporal)  Ht 1.676 m (5' 5.98\")  LMP 05/14/2017  BMI= There is no height or weight on file to calculate BMI.  Constitutional: healthy, alert and no acute distress   Psychiatric: mentation appears normal and affect normal/bright    JOINT/EXTREMITIES:  NEURO: no focal deficits  Affected extremity pulses are easily palpable.  SKIN: no excoriation or erythema. No signs of infection.    Sutures were removed.      Swelling: Very little swelling  Bruising: No bruising  ROM: Although she does not have complete range of motion of the little finger it is very near normal.  The " wound itself is very healthy-appearing and appears to have closed very nicely.      IMAGING INTERPRETATION:  No x-rays were obtained.    Pathology report was reviewed and shared with the patient.  There was no evidence to suggest that she had a wart. It appears she was having reaction to the previous foreign body.     ASSESSMENT:    ICD-10-CM    1. Postoperative visit Z48.89    2. Foreign body reaction right hand. M60.20        Status post debridement of foreign body reaction right hand at the base little finger. She is doing very well.    PLAN:      I don't think she is going to need physical therapy.  She will work on range of motion on her own.  She will add back more aggressive activities as she feels she can tolerate.    Return to Work:  Because the wound is healed we discussed returning to work. We both agree that returning at the end of this week for a single day and then having the weekend to recover should work out well.  Therefore return to work was written for this Friday, June 16.    We then discuss whether not she needs to return to see us. Again we agree that that's not really necessary unless she has issues.    Because this is a work-related injury I told her I would not complete paperwork for 2 months. Therefore if there is an issue she should contact us immediately. Otherwise, I will complete the paperwork anticipating that she is recovered fully and does not have disability.    Return to clinic PRN    Benjamin Vital MD            Again, thank you for allowing me to participate in the care of your patient.        Sincerely,              Benjamin Vital MD

## 2017-06-13 NOTE — LETTER
Mackenzie Worrell  [address] [place] [id number]    June 13, 2017           To Whom It May Concern:      Mackenzie Worthingtonmarco was seen in our clinic. She may return to school without restrictions.      Sincerely,            Benjamin Vital MD

## 2017-06-13 NOTE — LETTER
Mackenzie Worrell  [address] [address] [place] [phone]    June 13, 2017           To Whom It May Concern:      Mackenzie Worrell was seen in our clinic. She may return to work without restrictions on Friday 6/16/17.      Sincerely,              Benjamin Vital MD

## 2017-06-13 NOTE — MR AVS SNAPSHOT
"              After Visit Summary   2017    Mackenzie Worrell    MRN: [id number]           Patient Information     Date Of Birth          1970        Visit Information        Provider Department      2017 1:00 PM Benjamin Vital MD [facility]         Follow-ups after your visit        Who to contact     If you have questions or need follow up information about today's clinic visit or your schedule please contact [facility] directly at [phone].  Normal or non-critical lab and imaging results will be communicated to you by MyChart, letter or phone within 4 business days after the clinic has received the results. If you do not hear from us within 7 days, please contact the clinic through [employer] or phone. If you have a critical or abnormal lab result, we will notify you by phone as soon as possible.  Submit refill requests through "[employer] or call your pharmacy and they will forward the refill request to us. Please allow 3 business days for your refill to be completed.          Additional Information About Your Visit        [facility] Information     "[employer] lets you send messages to your doctor, view your test results, renew your prescriptions, schedule appointments and more. To sign up, go to [email or web address] . Click on \"Log in\" on the left side of the screen, which will take you to the Welcome page. Then click on \"Sign up Now\" on the right side of the page.     You will be asked to enter the access code listed below, as well as some personal information. Please follow the directions to create your username and password.     Your access code is: [email or web address]  Expires: 2017  1:08 PM     Your access code will  in 90 days. If you need help or a new code, please call your [facility] or [phone].        Care EveryWhere ID     This is your Care EveryWhere ID. This could be used by other organizations to access your [facility] medical " "records  [id number]        Your Vitals Were     Temperature Height Last Period             97.6  F (36.4  C) (Temporal) 1.676 m (5' 5.98\") 05/14/2017          Blood Pressure from Last 3 Encounters:   05/31/17 126/85   05/19/17 122/82   03/10/17 124/84    Weight from Last 3 Encounters:   05/31/17 98.9 kg (218 lb)   05/22/17 98.9 kg (218 lb)   05/19/17 98.9 kg (218 lb)              Today, you had the following     No orders found for display       Primary Care Provider Office Phone # Fax #    Sean Rusheugenein,  [phone]       [address] [place] [address]        Thank you!     Thank you for choosing [facility]  for your care. Our goal is always to provide you with excellent care. Hearing back from our patients is one way we can continue to improve our services. Please take a few minutes to complete the written survey that you may receive in the mail after your visit with us. Thank you!             Your Updated Medication List - Protect others around you: Learn how to safely use, store and throw away your medicines at www.disposemymeds.org.          This list is accurate as of: 6/13/17  1:08 PM.  Always use your most recent med list.                   Brand Name Dispense Instructions for use    adalimumab 40 MG/0.8ML pen kit    HUMIRA         albuterol (2.5 MG/3ML) 0.083% neb solution     60 vial    Take 1 vial (2.5 mg) by nebulization every 4 hours as needed for shortness of breath / dyspnea or wheezing       calcium carbonate 1250 MG tablet    OS- mg [place]. Ca         clobetasol 0.05 % ointment    TEMOVATE     Apply topically 2 times daily       colestipol 1 G tablet    COLESTID     Take 1 g by mouth 2 times daily       cyanocobalamin 1000 MCG/ML injection    VITAMIN B12     INJECT 1 ML (1,000 MCG TOTAL) INTRAMUSCULARLY EVERY 2 WEEKS       IBUPROFEN PO      Take 400 mg by mouth       loratadine 10 MG capsule      Take 10 mg by mouth daily " "seasonally       melatonin 3 MG tablet      Take 3 mg by mouth nightly as needed for sleep       senna-docusate 8.6-50 MG per tablet    SENOKOT-S;PERICOLACE    30 tablet    Take 1-2 tablets by mouth 2 times daily Take while on oral narcotics to prevent or treat constipation.       Syringe/Needle (Disp) 25G X 5/8\" 3 ML Misc      For use with B12 inj         "

## 2017-06-19 ENCOUNTER — TELEPHONE (OUTPATIENT)
Dept: ORTHOPEDICS | Facility: CLINIC | Age: 47
End: 2017-06-19

## 2017-06-19 ENCOUNTER — OFFICE VISIT (OUTPATIENT)
Dept: ORTHOPEDICS | Facility: CLINIC | Age: 47
End: 2017-06-19
Payer: OTHER MISCELLANEOUS

## 2017-06-19 VITALS — HEIGHT: 66 IN | TEMPERATURE: 97.6 F

## 2017-06-19 DIAGNOSIS — M60.20 FOREIGN BODY REACTION: Primary | ICD-10-CM

## 2017-06-19 PROCEDURE — 99212 OFFICE O/P EST SF 10 MIN: CPT | Performed by: ORTHOPAEDIC SURGERY

## 2017-06-19 ASSESSMENT — PAIN SCALES - GENERAL: PAINLEVEL: NO PAIN (0)

## 2017-06-19 NOTE — TELEPHONE ENCOUNTER
PT states yesterday she pulled a scab off and pus came out. She squeezed the area a little and a bunch more pus came out. It had been sore but had not thought much about it since it was directly over the suture spot. Since then it remains sore and has drained slightly. She had made an appt for today. I told her that that was good. FUENTES Hurtado

## 2017-06-19 NOTE — NURSING NOTE
"Chief Complaint   Patient presents with     RECHECK     Exploration and excision/removal foreign body palmar days right little finger.  DOS:5/31/17 (19 days postop)     Surgical Followup     PREOPERATIVE DIAGNOSIS:  Suspected foreign body, ulnar aspect right hand; possible wart. POSTOPERATIVE DIAGNOSIS:  Suspected foreign body, ulnar aspect right hand; possible wart. PROCEDURE:  1.  Exploration of wound ulnar palmar right hand with excision of 1 possible verruca.2.  Suspected foreign body reaction.  3.  Primary closure.        Initial Temp 97.6  F (36.4  C) (Temporal)  Ht 1.676 m (5' 5.98\")  LMP 05/14/2017 Estimated body mass index is 35.2 kg/(m^2) as calculated from the following:    Height as of 5/31/17: 1.676 m (5' 5.98\").    Weight as of 5/31/17: 98.9 kg (218 lb).  Medication Reconciliation: complete   ASHLEY Mahmood  "

## 2017-06-19 NOTE — PROGRESS NOTES
"HISTORY OF PRESENT ILLNESS:    Mackenzie Worrell is a 46 year old female who is seen in follow up for   Chief Complaint   Patient presents with     RECHECK     Exploration and excision/removal foreign body palmar days right little finger.  DOS:5/31/17 (19 days postop)     Surgical Followup     PREOPERATIVE DIAGNOSIS:  Suspected foreign body, ulnar aspect right hand; possible wart. POSTOPERATIVE DIAGNOSIS:  Suspected foreign body, ulnar aspect right hand; possible wart. PROCEDURE:  1.  Exploration of wound ulnar palmar right hand with excision of 1 possible verruca.2.  Suspected foreign body reaction.  3.  Primary closure.       Treatments tried to this point: Patient had her sutures taken out last week. She went back to work 3 days ago for 1 day. She had drainage yesterday. She says it was about a half a teaspoon. It has been dry since then.  Present symptoms: At present she has no complaints.      PHYSICAL EXAM:  Temp 97.6  F (36.4  C) (Temporal)  Ht 1.676 m (5' 5.98\")  LMP 05/14/2017  There is no height or weight on file to calculate BMI.       Physical Exam:  Vitals: Temp 97.6  F (36.4  C) (Temporal)  Ht 1.676 m (5' 5.98\")  LMP 05/14/2017  BMI= There is no height or weight on file to calculate BMI.  Constitutional: healthy, alert and no acute distress   Psychiatric: mentation appears normal and affect normal/bright    JOINT/EXTREMITIES:  NEURO: no focal deficits  Affected extremity pulses are easily palpable.  SKIN: no excoriation or erythema. No signs of infection. Surgical wound is healthy. There is a small area where there is encrusted scab.      Swelling: No unusual swelling.    ROM: No limitations to range of motion of the fingers.    Strength:  Good strength.        IMAGING INTERPRETATION:  No x-rays were taken.     ASSESSMENT:    ICD-10-CM    1. Foreign body reaction right hand. M60.20        Patient did have 1 episode of drainage. Whether or not there is retained foreign body reaction I can't say " for certain at this time.    PLAN:          We had a discussion about what could be occurring.  I suggest we do nothing at this time.  She is very familiar with local skin care.  I asked her to make an appointment for 1 week. If she has other episodes of drainage that will be obligated to return to the operating room and re-debride the area.    Return to clinic 1, weeks    Benjamin Vital MD

## 2017-06-19 NOTE — LETTER
"  6/19/2017       RE: Mackenzie Worrell  [address] [facility] [address]           Dear Colleague,    Thank you for referring your patient, Mackenzie Worrell, to the [facility]. Please see a copy of my visit note below.    HISTORY OF PRESENT ILLNESS:    Mackenzie Worrell is a 46 year old female who is seen in follow up for   Chief Complaint   Patient presents with     RECHECK     Exploration and excision/removal foreign body palmar days right little finger.  DOS:5/31/17 (19 days postop)     Surgical Followup     PREOPERATIVE DIAGNOSIS:  Suspected foreign body, ulnar aspect right hand; possible wart. POSTOPERATIVE DIAGNOSIS:  Suspected foreign body, ulnar aspect right hand; possible wart. PROCEDURE:  1.  Exploration of wound ulnar palmar right hand with excision of 1 possible verruca.2.  Suspected foreign body reaction.  3.  Primary closure.       Treatments tried to this point: Patient had her sutures taken out last week. She went back to work 3 days ago for 1 day. She had drainage yesterday. She says it was about a half a teaspoon. It has been dry since then.  Present symptoms: At present she has no complaints.      PHYSICAL EXAM:  Temp 97.6  F (36.4  C) (Temporal)  Ht 1.676 m (5' 5.98\")  LMP 05/14/2017  There is no height or weight on file to calculate BMI.       Physical Exam:  Vitals: Temp 97.6  F (36.4  C) (Temporal)  Ht 1.676 m (5' 5.98\")  LMP 05/14/2017  BMI= There is no height or weight on file to calculate BMI.  Constitutional: healthy, alert and no acute distress   Psychiatric: mentation appears normal and affect normal/bright    JOINT/EXTREMITIES:  NEURO: no focal deficits  Affected extremity pulses are easily palpable.  SKIN: no excoriation or erythema. No signs of infection. Surgical wound is healthy. There is a small area where there is encrusted scab.      Swelling: No unusual swelling.    ROM: No limitations to range of motion of the fingers.    Strength: "  Good strength.        IMAGING INTERPRETATION:  No x-rays were taken.     ASSESSMENT:    ICD-10-CM    1. Foreign body reaction right hand. M60.20        Patient did have 1 episode of drainage. Whether or not there is retained foreign body reaction I can't say for certain at this time.    PLAN:          We had a discussion about what could be occurring.  I suggest we do nothing at this time.  She is very familiar with local skin care.  I asked her to make an appointment for 1 week. If she has other episodes of drainage that will be obligated to return to the operating room and re-debride the area.    Return to clinic 1, weeks    Benjamin Vital MD              Again, thank you for allowing me to participate in the care of your patient.        Sincerely,              Benjamin Vital MD

## 2017-06-19 NOTE — TELEPHONE ENCOUNTER
Reason for call:  Patient reporting a symptom    Symptom or request: Feels that she has in infection rt hand- she had her stitches removed on 06/12 and she has pus coming from the wound. Should she come in to be seen or can she get an antibiotic?    Duration (how long have symptoms been present): n/a    Have you been treated for this before? No    Additional comments: none    Phone Number patient can be reached at:  Home number on file [phone] (home)     Best Time:  any    Can we leave a detailed message on this number:  YES    Call taken on 6/19/2017 at 8:39 AM by Leatha Chong

## 2017-06-19 NOTE — MR AVS SNAPSHOT
"              After Visit Summary   2017    Mackenzie Worrell    MRN: [id number]           Patient Information     Date Of Birth          1970        Visit Information        Provider Department      2017 4:10 PM Benjamin Vital MD [facility]         Follow-ups after your visit        Who to contact     If you have questions or need follow up information about today's clinic visit or your schedule please contact [facility] directly at [phone].  Normal or non-critical lab and imaging results will be communicated to you by MyChart, letter or phone within 4 business days after the clinic has received the results. If you do not hear from us within 7 days, please contact the clinic through [employer] / ?????.??hart or phone. If you have a critical or abnormal lab result, we will notify you by phone as soon as possible.  Submit refill requests through Smart[employer] or call your pharmacy and they will forward the refill request to us. Please allow 3 business days for your refill to be completed.          Additional Information About Your Visit        [facility] Information     Smartdate lets you send messages to your doctor, view your test results, renew your prescriptions, schedule appointments and more. To sign up, go to [email or web address] . Click on \"Log in\" on the left side of the screen, which will take you to the Welcome page. Then click on \"Sign up Now\" on the right side of the page.     You will be asked to enter the access code listed below, as well as some personal information. Please follow the directions to create your username and password.     Your access code is: [email or web address]  Expires: 2017  1:08 PM     Your access code will  in 90 days. If you need help or a new code, please call your [facility] or [phone].        Care EveryWhere ID     This is your Care EveryWhere ID. This could be used by other organizations to access your [place] medical " "records  [id number]        Your Vitals Were     Temperature Height Last Period             97.6  F (36.4  C) (Temporal) 1.676 m (5' 5.98\") 05/14/2017          Blood Pressure from Last 3 Encounters:   05/31/17 126/85   05/19/17 122/82   03/10/17 124/84    Weight from Last 3 Encounters:   05/31/17 98.9 kg (218 lb)   05/22/17 98.9 kg (218 lb)   05/19/17 98.9 kg (218 lb)              Today, you had the following     No orders found for display       Primary Care Provider Office Phone # Fax #    Sean Rusheugenein,  [phone]       [address] [place] [address]        Thank you!     Thank you for choosing [facility]  for your care. Our goal is always to provide you with excellent care. Hearing back from our patients is one way we can continue to improve our services. Please take a few minutes to complete the written survey that you may receive in the mail after your visit with us. Thank you!             Your Updated Medication List - Protect others around you: Learn how to safely use, store and throw away your medicines at www.disposemymeds.org.          This list is accurate as of: 6/19/17  4:18 PM.  Always use your most recent med list.                   Brand Name Dispense Instructions for use    adalimumab 40 MG/0.8ML pen kit    HUMIRA         albuterol (2.5 MG/3ML) 0.083% neb solution     60 vial    Take 1 vial (2.5 mg) by nebulization every 4 hours as needed for shortness of breath / dyspnea or wheezing       calcium carbonate 1250 MG tablet    OS- mg [place]. [place]         clobetasol 0.05 % ointment    TEMOVATE     Apply topically 2 times daily       colestipol 1 G tablet    COLESTID     Take 1 g by mouth 2 times daily       cyanocobalamin 1000 MCG/ML injection    VITAMIN B12     INJECT 1 ML (1,000 MCG TOTAL) INTRAMUSCULARLY EVERY 2 WEEKS       IBUPROFEN PO      Take 400 mg by mouth       loratadine 10 MG capsule      Take 10 mg by mouth daily " "seasonally       melatonin 3 MG tablet      Take 3 mg by mouth nightly as needed for sleep       senna-docusate 8.6-50 MG per tablet    SENOKOT-S;PERICOLACE    30 tablet    Take 1-2 tablets by mouth 2 times daily Take while on oral narcotics to prevent or treat constipation.       Syringe/Needle (Disp) 25G X 5/8\" 3 ML Misc      For use with B12 inj         "

## 2017-06-26 ENCOUNTER — OFFICE VISIT (OUTPATIENT)
Dept: ORTHOPEDICS | Facility: CLINIC | Age: 47
End: 2017-06-26
Payer: OTHER MISCELLANEOUS

## 2017-06-26 VITALS — TEMPERATURE: 97.4 F | HEIGHT: 66 IN

## 2017-06-26 DIAGNOSIS — M60.20 FOREIGN BODY REACTION: Primary | ICD-10-CM

## 2017-06-26 PROCEDURE — 99212 OFFICE O/P EST SF 10 MIN: CPT | Performed by: ORTHOPAEDIC SURGERY

## 2017-06-26 ASSESSMENT — PAIN SCALES - GENERAL: PAINLEVEL: MILD PAIN (2)

## 2017-06-26 NOTE — LETTER
"      6/26/2017         RE: Mackenzie Worrell  [address] [facility] [address]           Dear Colleague,    Thank you for referring your patient, Mackenzie Worrell, to the [facility]. Please see a copy of my visit note below.    HISTORY OF PRESENT ILLNESS:    Mackenzie Worrell is a 46 year old female who is seen in follow up for   Chief Complaint   Patient presents with     RECHECK     Exploration and excision/removal foreign body palmar days right little finger.  DOS:5/31/17 (26 days postop)     Surgical Followup     PREOPERATIVE DIAGNOSIS:  Suspected foreign body, ulnar aspect right hand; possible wart. POSTOPERATIVE DIAGNOSIS:  Suspected foreign body, ulnar aspect right hand; possible wart. PROCEDURE:  1.  Exploration of wound ulnar palmar right hand with excision of 1 possible verruca.2.  Suspected foreign body reaction.  3.  Primary closure.      Treatments tried to this point: There is no formal treatment since last office visit.  Present symptoms: She reports that there has been no drainage since last office visit. She does feel thickened area subcutaneously. This reminds her of her presurgical findings. However she does recognize this could be related to postsurgical scarring.    She is accompanied by her .      PHYSICAL EXAM:  Temp 97.4  F (36.3  C) (Temporal)  Ht 1.676 m (5' 5.98\")  LMP 05/14/2017  There is no height or weight on file to calculate BMI.       Physical Exam:  Vitals: Temp 97.4  F (36.3  C) (Temporal)  Ht 1.676 m (5' 5.98\")  LMP 05/14/2017  BMI= There is no height or weight on file to calculate BMI.  Constitutional: healthy, alert and no acute distress   Psychiatric: mentation appears normal and affect normal/bright    JOINT/EXTREMITIES:  NEURO: no focal deficits  Affected extremity pulses are easily palpable.  SKIN: no excoriation or erythema. No signs of infection. Surgical wound is healthy. There is some subcutaneous thickness which is " completely consistent with normal postsurgical scarring.                Swelling: Minimal    ROM: No limitations    Strength:  Normal     The incision is closed at this time.          IMAGING INTERPRETATION:  No x-rays obtained.     ASSESSMENT:    ICD-10-CM    1. Foreign body reaction right hand. M60.20        There has been no additional drainage since before last office visit.     PLAN:          We will recommend no different treatment at this time.    she is working her normal job.    We can only hope that there will be no recurrence of the drainage.    She will call us if this recurs.  If there is no recurrence anticipate her to return only as needed. As is the usual we will wait 2 months before any paperwork is filled out.      Return to clinic PRN    Benjamin Vital MD              Again, thank you for allowing me to participate in the care of your patient.        Sincerely,        Benjamin Vital MD

## 2017-06-26 NOTE — PROGRESS NOTES
"HISTORY OF PRESENT ILLNESS:    Mackenzie Worrell is a 46 year old female who is seen in follow up for   Chief Complaint   Patient presents with     RECHECK     Exploration and excision/removal foreign body palmar days right little finger.  DOS:5/31/17 (26 days postop)     Surgical Followup     PREOPERATIVE DIAGNOSIS:  Suspected foreign body, ulnar aspect right hand; possible wart. POSTOPERATIVE DIAGNOSIS:  Suspected foreign body, ulnar aspect right hand; possible wart. PROCEDURE:  1.  Exploration of wound ulnar palmar right hand with excision of 1 possible verruca.2.  Suspected foreign body reaction.  3.  Primary closure.      Treatments tried to this point: There is no formal treatment since last office visit.  Present symptoms: She reports that there has been no drainage since last office visit. She does feel thickened area subcutaneously. This reminds her of her presurgical findings. However she does recognize this could be related to postsurgical scarring.    She is accompanied by her .      PHYSICAL EXAM:  Temp 97.4  F (36.3  C) (Temporal)  Ht 1.676 m (5' 5.98\")  LMP 05/14/2017  There is no height or weight on file to calculate BMI.       Physical Exam:  Vitals: Temp 97.4  F (36.3  C) (Temporal)  Ht 1.676 m (5' 5.98\")  LMP 05/14/2017  BMI= There is no height or weight on file to calculate BMI.  Constitutional: healthy, alert and no acute distress   Psychiatric: mentation appears normal and affect normal/bright    JOINT/EXTREMITIES:  NEURO: no focal deficits  Affected extremity pulses are easily palpable.  SKIN: no excoriation or erythema. No signs of infection. Surgical wound is healthy. There is some subcutaneous thickness which is completely consistent with normal postsurgical scarring.                Swelling: Minimal    ROM: No limitations    Strength:  Normal     The incision is closed at this time.          IMAGING INTERPRETATION:  No x-rays obtained.     ASSESSMENT:    ICD-10-CM    1. " Foreign body reaction right hand. M60.20        There has been no additional drainage since before last office visit.     PLAN:          We will recommend no different treatment at this time.    she is working her normal job.    We can only hope that there will be no recurrence of the drainage.    She will call us if this recurs.  If there is no recurrence anticipate her to return only as needed. As is the usual we will wait 2 months before any paperwork is filled out.      Return to clinic [place] Vital MD

## 2017-06-26 NOTE — MR AVS SNAPSHOT
"              After Visit Summary   6/26/2017    Mackenzie Worrell    MRN: [id number]           Patient Information     Date Of Birth          1970        Visit Information        Provider Department      6/26/2017 4:30 PM Benjamin Vital MD [facility]         Follow-ups after your visit        Your next 10 appointments already scheduled     Jun 26, 2017  4:30 PM CDT   Return Visit with Benjamin Vital MD   [facility] ([facility])    [address] [place] [id number] [phone]              Who to contact     If you have questions or need follow up information about today's clinic visit or your schedule please contact [facility] directly at [phone].  Normal or non-critical lab and imaging results will be communicated to you by MyChart, letter or phone within 4 business days after the clinic has received the results. If you do not hear from us within 7 days, please contact the clinic through MyChart or phone. If you have a critical or abnormal lab result, we will notify you by phone as soon as possible.  Submit refill requests through [employer] or call your pharmacy and they will forward the refill request to us. Please allow 3 business days for your refill to be completed.          Additional Information About Your Visit        MyCharHEXIO Information     [employer] lets you send messages to your doctor, view your test results, renew your prescriptions, schedule appointments and more. To sign up, go to [email or web address] . Click on \"Log in\" on the left side of the screen, which will take you to the Welcome page. Then click on \"Sign up Now\" on the right side of the page.     You will be asked to enter the access code listed below, as well as some personal information. Please follow the directions to create your username and password.     Your access code is: [email or web address]  Expires: 9/11/2017  1:08 PM     Your access code " "will  in 90 days. If you need help or a new code, please call your [place] clinic or [phone].        Care EveryWhere ID     This is your Care EveryWhere ID. This could be used by other organizations to access your [place] medical records  [id number]        Your Vitals Were     Temperature Height Last Period             97.4  F (36.3  C) (Temporal) 5' 5.98\" (1.676 m) 2017          Blood Pressure from Last 3 Encounters:   17 126/85   17 122/82   03/10/17 124/84    Weight from Last 3 Encounters:   17 218 lb (98.9 kg)   17 218 lb (98.9 kg)   17 218 lb (98.9 kg)              Today, you had the following     No orders found for display       Primary Care Provider Office Phone # Fax #    Sean Krzysztof Alvarenga [phone]       [address] [place] [address]        Equal Access to Services     [facility]: Hadii ainsley ku hadasho Soomaali, waaxda luqadaha, qaybta kaalmada adeegyada, waxlaurie larose . So [facility] [phone].    ATENCIÓN: Si habla español, tiene a fletcher disposición servicios gratuitos de asistencia lingüística. Llame al [phone].    We comply with applicable federal civil rights laws and [place] laws. We do not discriminate on the basis of race, color, national origin, age, disability sex, sexual orientation or gender identity.            Thank you!     Thank you for choosing [facility]  for your care. Our goal is always to provide you with excellent care. Hearing back from our patients is one way we can continue to improve our services. Please take a few minutes to complete the written survey that you may receive in the mail after your visit with us. Thank you!             Your Updated Medication List - Protect others around you: Learn how to safely use, store and throw away your medicines at www.disposemymeds.org.          This list is accurate as of: 17  4:20 PM. " " Always use your most recent med list.                   Brand Name Dispense Instructions for use Diagnosis    adalimumab 40 MG/0.8ML pen kit    HUMIRA          albuterol (2.5 MG/3ML) 0.083% neb solution     60 vial    Take 1 vial (2.5 mg) by nebulization every 4 hours as needed for shortness of breath / dyspnea or wheezing    Acute bronchitis with coexisting condition requiring prophylactic treatment       calcium carbonate 1250 MG tablet    OS- mg [place]. Ca          clobetasol 0.05 % ointment    TEMOVATE     Apply topically 2 times daily        colestipol 1 G tablet    COLESTID     Take 1 g by mouth 2 times daily        cyanocobalamin 1000 MCG/ML injection    VITAMIN B12     INJECT 1 ML (1,000 MCG TOTAL) INTRAMUSCULARLY EVERY 2 WEEKS        IBUPROFEN PO      Take 400 mg by mouth        loratadine 10 MG capsule      Take 10 mg by mouth daily seasonally        melatonin 3 MG tablet      Take 3 mg by mouth nightly as needed for sleep        senna-docusate 8.6-50 MG per tablet    SENOKOT-S;PERICOLACE    30 tablet    Take 1-2 tablets by mouth 2 times daily Take while on oral narcotics to prevent or treat constipation.    Foreign body in hand, right, subsequent encounter       Syringe/Needle (Disp) 25G X 5/8\" 3 ML Misc      For use with B12 inj          "

## 2017-06-26 NOTE — NURSING NOTE
"Chief Complaint   Patient presents with     RECHECK     Exploration and excision/removal foreign body palmar days right little finger.  DOS:5/31/17 (26 days postop)     Surgical Followup     PREOPERATIVE DIAGNOSIS:  Suspected foreign body, ulnar aspect right hand; possible wart. POSTOPERATIVE DIAGNOSIS:  Suspected foreign body, ulnar aspect right hand; possible wart. PROCEDURE:  1.  Exploration of wound ulnar palmar right hand with excision of 1 possible verruca.2.  Suspected foreign body reaction.  3.  Primary closure.        Initial Temp 97.4  F (36.3  C) (Temporal)  Ht 5' 5.98\" (1.676 m)  LMP 05/14/2017 Estimated body mass index is 35.2 kg/(m^2) as calculated from the following:    Height as of 5/31/17: 5' 5.98\" (1.676 m).    Weight as of 5/31/17: 218 lb (98.9 kg).  Medication Reconciliation: complete   ASHLEY Mahmood  "

## 2017-09-21 ENCOUNTER — TELEPHONE (OUTPATIENT)
Dept: INTERNAL MEDICINE | Facility: CLINIC | Age: 47
End: 2017-09-21

## 2017-09-21 NOTE — TELEPHONE ENCOUNTER
Panel Management Review      Patient has the following on her problem list: None      Composite cancer screening  Chart review shows that this patient is due/due soon for the following Pap Smear  Summary:    Patient is due/failing the following:   LDL and PAP    Action needed:   Patient needs office visit for physical.    Type of outreach:    Sent letter.    Questions for provider review:    None                                                                                                                                    Michelle RAMIREZ       Chart routed to Care Team .

## 2017-09-21 NOTE — LETTER
[address]   [address]  Tel. [phone] / Fax [phone]    September 21, 2017        Mackenzie Worrell  [address] [facility] [address]          Dear Mackenzie,    Our records show that you are due for a physical and Pap. Please contact us at [phone] to schedule. It is important to stay current with healthcare visits and lab work as directed by your physician.     If you have a high deductible or currently do not have health insurance please consider contacting the [facility] program. For eligible women, [place] provides free office visits for breast and cervical exams, as well as a screening mammogram and Pap smears. If one of your screening tests shows a problem, [place] covers many diagnostic services and can often cover treatment, if needed. You can complete the office visit at your [facility]. You can contact [facility] at [phone].    If you have completed these tests at another facility, please have the records sent to our clinic so that we can best coordinate your care. If you have been seen or you plan to be seen by another provider for these concerns or if you are unable follow through on the recommendations, please contact the clinic.    Taking care of your health is important to us!       Sincerely,    Sean Alvarenga D.O.

## 2017-10-10 ENCOUNTER — TRANSFERRED RECORDS (OUTPATIENT)
Dept: HEALTH INFORMATION MANAGEMENT | Facility: CLINIC | Age: 47
End: 2017-10-10

## 2017-11-09 DIAGNOSIS — K50.90 CROHN DISEASE (H): Primary | ICD-10-CM

## 2017-11-09 DIAGNOSIS — Z79.899 HIGH RISK MEDICATION USE: ICD-10-CM

## 2017-11-10 ENCOUNTER — TELEPHONE (OUTPATIENT)
Dept: FAMILY MEDICINE | Facility: OTHER | Age: 47
End: 2017-11-10

## 2017-11-10 NOTE — TELEPHONE ENCOUNTER
11/10/2017    Call Regarding Preventive Health Screening Cervical/PAP    Attempt 3    Message on voicemail     Comments:       Outreach   MG

## 2017-11-17 DIAGNOSIS — K50.90 CROHN DISEASE (H): ICD-10-CM

## 2017-11-17 DIAGNOSIS — Z79.899 HIGH RISK MEDICATION USE: ICD-10-CM

## 2017-11-17 LAB
ALBUMIN SERPL-MCNC: 3.3 G/DL (ref 3.4–5)
ALP SERPL-CCNC: 72 U/L (ref 40–150)
ALT SERPL W P-5'-P-CCNC: 21 U/L (ref 0–50)
AST SERPL W P-5'-P-CCNC: 13 U/L (ref 0–45)
BILIRUB DIRECT SERPL-MCNC: <0.1 MG/DL (ref 0–0.2)
BILIRUB SERPL-MCNC: 0.4 MG/DL (ref 0.2–1.3)
CREAT SERPL-MCNC: 0.71 MG/DL (ref 0.52–1.04)
GFR SERPL CREATININE-BSD FRML MDRD: 88 ML/MIN/1.7M2
PROT SERPL-MCNC: 7.2 G/DL (ref 6.8–8.8)

## 2017-11-17 PROCEDURE — 82565 ASSAY OF CREATININE: CPT | Performed by: PHYSICIAN ASSISTANT

## 2017-11-17 PROCEDURE — 82607 VITAMIN B-12: CPT | Performed by: PHYSICIAN ASSISTANT

## 2017-11-17 PROCEDURE — 36415 COLL VENOUS BLD VENIPUNCTURE: CPT | Performed by: PHYSICIAN ASSISTANT

## 2017-11-17 PROCEDURE — 80076 HEPATIC FUNCTION PANEL: CPT | Performed by: PHYSICIAN ASSISTANT

## 2017-11-18 LAB — VIT B12 SERPL-MCNC: 411 PG/ML (ref 193–986)

## 2017-12-07 DIAGNOSIS — E53.8 VITAMIN B 12 DEFICIENCY: Primary | ICD-10-CM

## 2017-12-07 DIAGNOSIS — K50.818 CROHN'S DISEASE OF BOTH SMALL AND LARGE INTESTINE WITH OTHER COMPLICATION (H): ICD-10-CM

## 2017-12-14 ENCOUNTER — TRANSFERRED RECORDS (OUTPATIENT)
Dept: HEALTH INFORMATION MANAGEMENT | Facility: CLINIC | Age: 47
End: 2017-12-14

## 2018-02-22 DIAGNOSIS — K50.818 CROHN'S DISEASE OF BOTH SMALL AND LARGE INTESTINE WITH OTHER COMPLICATION (H): ICD-10-CM

## 2018-02-22 LAB
ALBUMIN SERPL-MCNC: 3.5 G/DL (ref 3.4–5)
ALP SERPL-CCNC: 88 U/L (ref 40–150)
ALT SERPL W P-5'-P-CCNC: 24 U/L (ref 0–50)
AST SERPL W P-5'-P-CCNC: 18 U/L (ref 0–45)
BASOPHILS # BLD AUTO: 0 10E9/L (ref 0–0.2)
BASOPHILS NFR BLD AUTO: 0.4 %
BILIRUB DIRECT SERPL-MCNC: <0.1 MG/DL (ref 0–0.2)
BILIRUB SERPL-MCNC: 0.4 MG/DL (ref 0.2–1.3)
CREAT SERPL-MCNC: 0.78 MG/DL (ref 0.52–1.04)
DIFFERENTIAL METHOD BLD: NORMAL
EOSINOPHIL # BLD AUTO: 0.1 10E9/L (ref 0–0.7)
EOSINOPHIL NFR BLD AUTO: 0.9 %
ERYTHROCYTE [DISTWIDTH] IN BLOOD BY AUTOMATED COUNT: 14 % (ref 10–15)
GFR SERPL CREATININE-BSD FRML MDRD: 80 ML/MIN/1.7M2
HCT VFR BLD AUTO: 46.4 % (ref 35–47)
HGB BLD-MCNC: 14.6 G/DL (ref 11.7–15.7)
IMM GRANULOCYTES # BLD: 0 10E9/L (ref 0–0.4)
IMM GRANULOCYTES NFR BLD: 0.1 %
LYMPHOCYTES # BLD AUTO: 3.2 10E9/L (ref 0.8–5.3)
LYMPHOCYTES NFR BLD AUTO: 33.6 %
MCH RBC QN AUTO: 31.5 PG (ref 26.5–33)
MCHC RBC AUTO-ENTMCNC: 31.5 G/DL (ref 31.5–36.5)
MCV RBC AUTO: 100 FL (ref 78–100)
MONOCYTES # BLD AUTO: 0.6 10E9/L (ref 0–1.3)
MONOCYTES NFR BLD AUTO: 6.4 %
NEUTROPHILS # BLD AUTO: 5.6 10E9/L (ref 1.6–8.3)
NEUTROPHILS NFR BLD AUTO: 58.6 %
PLATELET # BLD AUTO: 275 10E9/L (ref 150–450)
PROT SERPL-MCNC: 7.7 G/DL (ref 6.8–8.8)
RBC # BLD AUTO: 4.63 10E12/L (ref 3.8–5.2)
WBC # BLD AUTO: 9.6 10E9/L (ref 4–11)

## 2018-02-22 PROCEDURE — 82565 ASSAY OF CREATININE: CPT | Performed by: PHYSICIAN ASSISTANT

## 2018-02-22 PROCEDURE — 85025 COMPLETE CBC W/AUTO DIFF WBC: CPT | Performed by: PHYSICIAN ASSISTANT

## 2018-02-22 PROCEDURE — 80076 HEPATIC FUNCTION PANEL: CPT | Performed by: PHYSICIAN ASSISTANT

## 2018-02-22 PROCEDURE — 86480 TB TEST CELL IMMUN MEASURE: CPT | Performed by: PHYSICIAN ASSISTANT

## 2018-02-22 PROCEDURE — 36415 COLL VENOUS BLD VENIPUNCTURE: CPT | Performed by: PHYSICIAN ASSISTANT

## 2018-02-23 LAB
M TB TUBERC IFN-G BLD QL: NEGATIVE
M TB TUBERC IFN-G/MITOGEN IGNF BLD: 0 IU/ML

## 2018-02-27 ENCOUNTER — TRANSFERRED RECORDS (OUTPATIENT)
Dept: HEALTH INFORMATION MANAGEMENT | Facility: CLINIC | Age: 48
End: 2018-02-27

## 2018-02-27 ENCOUNTER — OFFICE VISIT (OUTPATIENT)
Dept: ALLERGY | Facility: OTHER | Age: 48
End: 2018-02-27
Payer: COMMERCIAL

## 2018-02-27 VITALS
BODY MASS INDEX: 34.72 KG/M2 | WEIGHT: 216 LBS | HEART RATE: 96 BPM | TEMPERATURE: 98 F | OXYGEN SATURATION: 99 % | HEIGHT: 66 IN | DIASTOLIC BLOOD PRESSURE: 84 MMHG | RESPIRATION RATE: 20 BRPM | SYSTOLIC BLOOD PRESSURE: 130 MMHG

## 2018-02-27 DIAGNOSIS — L50.9 HIVES: ICD-10-CM

## 2018-02-27 DIAGNOSIS — J45.20 MILD INTERMITTENT ASTHMA WITHOUT COMPLICATION: ICD-10-CM

## 2018-02-27 DIAGNOSIS — L50.3 DERMATOGRAPHISM: ICD-10-CM

## 2018-02-27 DIAGNOSIS — J31.0 RHINOCONJUNCTIVITIS: ICD-10-CM

## 2018-02-27 DIAGNOSIS — H10.9 RHINOCONJUNCTIVITIS: ICD-10-CM

## 2018-02-27 LAB
FEF 25/75: NORMAL
FEV-1: NORMAL
FEV1/FVC: NORMAL
FVC: NORMAL
LOCATION PERFORMED: NORMAL
MISCELLANEOUS TEST: NORMAL
NORMAL RANGE FOR SEND OUTS MISC TEST: NORMAL
RESULT: NORMAL
SEND OUTS MISC TEST CODE: NORMAL
SEND OUTS MISC TEST SPECIMEN: NORMAL
TEST NAME: NORMAL

## 2018-02-27 PROCEDURE — 86003 ALLG SPEC IGE CRUDE XTRC EA: CPT | Mod: 90 | Performed by: ALLERGY & IMMUNOLOGY

## 2018-02-27 PROCEDURE — 86003 ALLG SPEC IGE CRUDE XTRC EA: CPT | Mod: 91 | Performed by: ALLERGY & IMMUNOLOGY

## 2018-02-27 PROCEDURE — 95004 PERQ TESTS W/ALRGNC XTRCS: CPT | Performed by: ALLERGY & IMMUNOLOGY

## 2018-02-27 PROCEDURE — 99204 OFFICE O/P NEW MOD 45 MIN: CPT | Mod: 25 | Performed by: ALLERGY & IMMUNOLOGY

## 2018-02-27 PROCEDURE — 99000 SPECIMEN HANDLING OFFICE-LAB: CPT | Performed by: ALLERGY & IMMUNOLOGY

## 2018-02-27 PROCEDURE — 36415 COLL VENOUS BLD VENIPUNCTURE: CPT | Performed by: ALLERGY & IMMUNOLOGY

## 2018-02-27 PROCEDURE — 94010 BREATHING CAPACITY TEST: CPT | Performed by: ALLERGY & IMMUNOLOGY

## 2018-02-27 RX ORDER — ALBUTEROL SULFATE 90 UG/1
2 AEROSOL, METERED RESPIRATORY (INHALATION) EVERY 4 HOURS PRN
Qty: 2 INHALER | Refills: 3 | Status: SHIPPED | OUTPATIENT
Start: 2018-02-27

## 2018-02-27 RX ORDER — MULTIPLE VITAMINS W/ MINERALS TAB 9MG-400MCG
1 TAB ORAL DAILY
COMMUNITY

## 2018-02-27 NOTE — ASSESSMENT & PLAN NOTE
Summer and fall nasal symptoms. Claritin somewhat helpful. No history of allergy testing. Symptoms made worse with mowing grass and raking leaves.     Skin testing:  Dermatographism noted. Demonstrated by positive normal saline and numerous other positive results. Unable to interpret skin testing. This will be scanned into computer.     - Serum IgE for environmental allergens.   - Zyrtec or Allegra as needed.   - If no improvement could add nasal steroid.

## 2018-02-27 NOTE — ASSESSMENT & PLAN NOTE
Hives occurring of 2 occasions and brief in duration and responsive to benadryl. One reaction after eating a enchilada that was boxed and one possibly related to melatonin. Skin testing with dermatographism and cannot interpret today.     - Serum IgE for foods implicated. She will review melatonin ingredients and let us know. If hives return she will keep hive diary. No other triggers immediately identified via history.   - Continue to avoid potential implicated foods.   - if hives return use Zyrtec or Allegra as needed.   - If food allergies will provided anaphylaxis action plan and injectable epinephrine.

## 2018-02-27 NOTE — NURSING NOTE
Per provider verbal order, placed Adult Environmental Panel, chicken, corn, rice, yeast scratch test.  Consent was obtained prior to procedure.  Once panels were placed, patient was monitored for 15 minutes in clinic.  RN read test after 15 minutes and provider was notified of results.  Pt tolerated procedure well.  All questions and concerns were addressed at office visit.     Ronda Monsalve RN

## 2018-02-27 NOTE — LETTER
2/27/2018         RE: Mackenzie Worrell  [address]  [facility] [address]        Dear Colleague,    Thank you for referring your patient, Mackenzie Worrell, to the [facility]. Please see a copy of my visit note below.    Mackenzie Worrell is a 47 year old White female with previous medical history significant for psoriasis, Crohn's disease, intermittent asthma. Mackenzie Worrell is being seen today for evaluation of allergies to food, asthma and seasonal allergies.     The patient reports that since high school she has had summer and fall rhinorrhea, nasal itching, sneezing, congestion and postnasal drainage.  She denies ocular symptoms.  Symptoms are made worse by mowing grass and raking leaves.  No history of allergy testing.  Claritin is 75% beneficial.  No use of nasal corticosteroids.  No use of nasal antihistamines.    Patient reports that in the fall she will develop wheezing and coughing.  This is been present since high school.  The symptoms are intermittent.  No nocturnal symptoms.  Albuterol has been used in the past and beneficial.  Symptoms are made worse additionally with cold air.  No history of prednisone use, ER visits or hospitalizations.  In the fall she will use her albuterol inhaler less than twice per week.    The patient reports that on 21 February she ate dinner at 7 PM and she noted hives and itching involving her right arm.  Treated with diphenhydramine and this was beneficial.  The following morning hives were completely resolved.  That night for dinner she consumed boxed enchiladas which contained rice, chicken, green chili, tomato, onion, corn, Castano beans, garlic, milk, green pepper, red pepper, annatto, xantham gum, black beans, jalapeno, wheat and yeast.  She subsequently has consumed and tolerated tomato, onions, garlic, milk, red pepper, green pepper, jalapeno and wheat.  Prior to that the patient reports another episodes of hives at which  time she took a different brand of melatonin at 9 PM and woke up with hives at 4 AM the next day.  There were no foods associated with this reaction.  With either reaction there are no other medications which have been new.  No over-the-counter supplements.  No new soaps, shampoos, detergents, fabric softeners, dryer sheets.  No relation to insect stings.  No prior history of hives. Hives not related with beef exposure. Hives not made worse with any physical trigger such as heat, cold, vibration or exercise.     She follows with dermatology for eczema and psoriasis. She follows with rheumatology. She follows with GI for Crohn's disease.       ENVIRONMENTAL HISTORY: [place] The home is heated with a forced air. They does have central air conditioning. The patient's bedroom is furnished with carpeting in bedroom and fabric window coverings.  Pets inside the house include 2 cat(s) and 1 dog(s). There is not history of cockroach or mice infestation. There is/are 0 smokers in the house.  The house does not have a damp basement.        ACT Total Scores 2/27/2018   ACT TOTAL SCORE -   ASTHMA ER VISITS -   ASTHMA HOSPITALIZATIONS -   ACT TOTAL SCORE (Goal Greater than or Equal to 20) 25   In the past 12 months, how many times did you visit the emergency room for your asthma without being admitted to the hospital? 0   In the past 12 months, how many times were you hospitalized overnight because of your asthma? 0     Past Medical History:   Diagnosis Date     Contact dermatitis and other eczema, due to unspecified cause     dishydrotic eczema/hands     Mild intermittent asthma      Osteoarthrosis, unspecified whether generalized or localized, unspecified site     arthritis     Regional enteritis of small intestine with large intestine (H)     Crohn's disease     Synovitis and tenosynovitis, unspecified     wrist tendonitis     Tobacco use disorder      Family History   Problem  Relation Age of Onset     DIABETES Father      Type II     EYE* Father      Cataract Surgery, Glaucoma     DIABETES Paternal Grandmother      Type II     Anesthesia Reaction No family hx of      Past Surgical History:   Procedure Laterality Date     C NONSPECIFIC PROCEDURE      laparoscopy for infertility workup/ adhesions     C RESECT SMALL INTEST,SINGL RESEC/ANAS      Resection, Partial Small Bowel     EXCISE MASS EAR EXTERNAL Right 5/23/2016    Procedure: EXCISE MASS EAR EXTERNAL;  Surgeon: Asif Akhtar MD;  Location: [facility] OR     HC COLONOSCOPY W BIOPSY  05/18/10     HC LAPAROSCOPY, SURGICAL; CHOLECYSTECTOMY      Cholecystectomy, Laparoscopic     HC REMOVAL OF TONSILS,<11 Y/O      Tonsils <13y.o.     HERNIA REPAIR, INCISIONAL  08/13/08    Incarcerated ventral incisional hernia.     LAPAROSCOPIC RESECTION SMALL BOWEL N/A 3/9/2016    Procedure: LAPAROSCOPIC RESECTION SMALL BOWEL;  Surgeon: Mark Francisco MD;  Location: [facility] OR     REMOVE FOREIGN BODY HAND Right 5/31/2017    Procedure: REMOVE FOREIGN BODY HAND;  exploration and excision/removal foreign body palmar days right little finger;  Surgeon: Benjamin Vital MD;  Location: [facility] OR       REVIEW OF SYSTEMS:  General: negative for weight gain. negative for weight loss. negative for changes in sleep.   Ears: negative for fullness. negative for hearing loss. negative for dizziness.   Nose: negative for snoring.negative for changes in smell. negative for drainage.   Eyes: negative for eye watering. negative for eye itching. negative for vision changes. negative for eye redness.  Throat: negative for hoarseness. negative for sore throat. negative for trouble swallowing.   Lungs: negative for shortness of breath.negative for wheezing. negative for sputum production.   Cardiovascular: negative for chest pain. negative for swelling of ankles. negative for fast or irregular heartbeat.   Gastrointestinal: negative for nausea. positive  for heartburn.  "negative for acid reflux.   Musculoskeletal: negative for joint pain. positive  for joint stiffness. negative for joint swelling.   Neurologic: negative for seizures. negative for fainting. negative for weakness.   Psychiatric: negative for changes in mood. negative for anxiety.   Endocrine: negative for cold intolerance. negative for heat intolerance. negative for tremors.   Lymphatic: negative for lower extremity swelling. negative for lymph node swelling.   Hematologic: negative for easy bruising. negative for easy bleeding.  Integumentary: positive  for rash. positive  for scaling. negative for nail changes.       Current Outpatient Prescriptions:      Fluocinonide (LIDEX EX), Apply 20 % topically as needed , Disp: , Rfl: 11     multivitamin, therapeutic with minerals (MULTI-VITAMIN) TABS tablet, Take 1 tablet by mouth daily, Disp: , Rfl:      albuterol (PROAIR HFA/PROVENTIL HFA/VENTOLIN HFA) 108 (90 BASE) MCG/ACT Inhaler, Inhale 2 puffs into the lungs every 4 hours as needed, Disp: 2 Inhaler, Rfl: 3     clobetasol (TEMOVATE) 0.05 % ointment, Apply topically 2 times daily, Disp: , Rfl:      colestipol (COLESTID) 1 G tablet, Take 1 g by mouth 2 times daily, Disp: , Rfl: 3     cyanocobalamin (VITAMIN B12) 1000 MCG/ML injection, INJECT 1 ML (1,000 MCG TOTAL) INTRAMUSCULARLY EVERY 2 WEEKS, Disp: , Rfl: 3     calcium carbonate (OS- MG [place]. [place]) 500 MG tablet, , Disp: , Rfl:      Syringe/Needle, Disp, 25G X 5/8\" 3 ML MISC, For use with B12 inj, Disp: , Rfl:      adalimumab (HUMIRA) 40 MG/0.8ML pen kit, , Disp: , Rfl:      melatonin 3 MG tablet, Take 3 mg by mouth nightly as needed for sleep, Disp: , Rfl:      senna-docusate (SENOKOT-S;PERICOLACE) 8.6-50 MG per tablet, Take 1-2 tablets by mouth 2 times daily Take while on oral narcotics to prevent or treat constipation. (Patient not taking: Reported on 2/27/2018), Disp: 30 tablet, Rfl: 0     albuterol (2.5 MG/3ML) 0.083% nebulizer solution, Take 1 vial (2.5 mg) " by nebulization every 4 hours as needed for shortness of breath / dyspnea or wheezing (Patient not taking: Reported on 2/27/2018), Disp: 60 vial, Rfl: 0     IBUPROFEN PO, Take 400 mg by mouth, Disp: , Rfl:      loratadine 10 MG capsule, Take 10 mg by mouth daily seasonally, Disp: , Rfl:   No current facility-administered medications for this visit.     Facility-Administered Medications Ordered in Other Visits:      albuterol (PROAIR HFA, PROVENTIL HFA, VENTOLIN HFA) inhaler, , , PRN, Iman, Chani Bee, APRN CRNA, 8 puff at 03/09/16 1330  Immunization History   Administered Date(s) Administered     Influenza (IIV3) PF 10/13/1999, 12/26/2000, 10/10/2001, 10/02/2009     Influenza Vaccine IM 3yrs+ 4 Valent IIV4 12/01/2016     Influenza Vaccine, 3 YRS +, IM (QUADRIVALENT W/PRESERVATIVES) 11/24/2014     Mantoux Tuberculin Skin Test 04/27/2005     TD (ADULT, 7+) 09/22/2000     TDAP Vaccine (Adacel) 10/01/2014     Allergies   Allergen Reactions     No Known Drug Allergies          EXAM:   Constitutional:  Appears well-developed and well-nourished. No distress.   HEENT:   Head: Normocephalic.   Right Ear: External ear normal. TM normal  Left Ear: External ear normal. TM normal  Mouth/Throat: No oropharyngeal exudate present.   No cobblestoning of posterior oropharynx.   Nasal tissue pink and normal appearing.  No rhinorrhea noted.    Eyes: Conjunctivae are non-erythematous   No maxillary or frontal sinus tenderness to palpation.   Cardiovascular: Normal rate, regular rhythm and normal heart sounds. Exam reveals no gallop and no friction rub.   No murmur heard.  Respiratory: Effort normal and breath sounds normal. No respiratory distress. No wheezes. No rales.   Musculoskeletal: Normal range of motion.   Lymphadenopathy:   No cervical adenopathy.   No lower extremity edema.   Neuro: Oriented to person, place, and time.  Skin: Erythematous, dry, flaky rash noted on sole of bilateral feet.   Psychiatric: Normal mood and  affect.     Nursing note and vitals reviewed.      WORKUP:   Skin testing  Dermatographism noted. Demonstrated by positive normal saline and numerous other positive results. Unable to interpret skin testing. This will be scanned into computer.     Spirometry-pre  FVC % pred:96  FEV1 % pred:80  FEV1/FVC % act:67  FEF 25-75% pred:41    Spirometry-post  FVC % pred:88  FEV1 % pred:81  FEV1/FVC % act:74  FEF 25-75% pred:59    Non-significant improvement in FEV1.         ASSESSMENT/PLAN:  Problem List Items Addressed This Visit        Respiratory    Mild intermittent asthma - Primary     Flares in the fall only. Albuterol helpful. Spirometry with mild obstruction. Treated with albuterol and no significant improvement in FEV1 ACT 25.     - Albuterol 2-4 puffs inhaled (use a spacer unless using a Proair Respiclick device) every 4 hours as needed for chest tightness, wheezing, shortness of breath and/or coughing.   - Avoid asthma triggers.  - Allergy blood testing. Skin testing not reliable given dermatographism.              Relevant Medications    albuterol (PROAIR HFA/PROVENTIL HFA/VENTOLIN HFA) 108 (90 BASE) MCG/ACT Inhaler    Other Relevant Orders    Spirometry, Breathing Capacity (Completed)    ALBUTEROL UNIT DOSE, 1 MG (Completed)    Allergen cat epithellium IgE (Completed)    Allergen dog epithelium IgE (Completed)    Allergen Rahul grass IgE (Completed)    Allergen michelle IgE (Completed)    Allergen D pteronyssinus IgE (Completed)    Allergen D farinae IgE (Completed)    Allergen alternaria alternata IgE (Completed)    Allergen aspergillus fumigatus IgE (Completed)    Allergen cladosporium herbarum IgE (Completed)    Allergen Epicoccum purpurascens IgE (Completed)    Allergen penicillium notatum IgE (Completed)    Allergen Red [place] IgE (Completed)    Allergen silver  birch IgE (Completed)    Allergen Tree White [place] IgE (Completed)    Allergen [place] Tree (Completed)    Allergen white pine IgE (Completed)     Allergen alecia white IgE (Completed)    Allergen [place] IgE (Completed)    Allergen cottonwood IgE (Completed)    Allergen elm IgE (Completed)    Allergen maple box elder IgE (Completed)    Allergen oak white IgE (Completed)    Allergen English plantain IgE (Completed)    Allergen giant ragweed IgE (Completed)    Allergen lamb's quarter IgE (Completed)    Allergen Mugwort IgE (Completed)    Allergen ragweed short IgE (Completed)    Allergen Sagebrush Wormwood IgE (Completed)    Allergen Sheep Sorrel IgE (Completed)    Allergen thistle Russian IgE (Completed)    Allergen Weed Nettle IgE (Completed)    Allergen, Kochia/Firebush (Completed)    ALLERGY SKIN TESTS,ALLERGENS (Completed)    [facility] Miscellaneous Test (Completed)    BRONCHODILATION RESPONSE, PRE/POST ADMIN    [facility] Miscellaneous Test (Completed)    [facility] Miscellaneous Test (Completed)    [facility] Miscellaneous Test (Completed)    [facility] Miscellaneous Test (Completed)    Send outs misc test (Completed)       Musculoskeletal and Integumentary    Hives     Hives occurring of 2 occasions and brief in duration and responsive to benadryl. One reaction after eating a enchilada that was boxed and one possibly related to melatonin. Skin testing with dermatographism and cannot interpret today.     - Serum IgE for foods implicated. She will review melatonin ingredients and let us know. If hives return she will keep hive diary. No other triggers immediately identified via history.   - Continue to avoid potential implicated foods.   - if hives return use Zyrtec or Allegra as needed.   - If food allergies will provided anaphylaxis action plan and injectable epinephrine.          Relevant Medications    Fluocinonide (LIDEX EX)    albuterol (PROAIR HFA/PROVENTIL HFA/VENTOLIN HFA) 108 (90 BASE) MCG/ACT Inhaler    Other Relevant Orders    Allergen cat epithellium IgE (Completed)    Allergen dog epithelium IgE (Completed)    Allergen Rahul grass IgE (Completed)    Allergen michelle IgE  (Completed)    Allergen D pteronyssinus IgE (Completed)    Allergen D farinae IgE (Completed)    Allergen alternaria alternata IgE (Completed)    Allergen aspergillus fumigatus IgE (Completed)    Allergen cladosporium herbarum IgE (Completed)    Allergen Epicoccum purpurascens IgE (Completed)    Allergen penicillium notatum IgE (Completed)    Allergen Red [place] IgE (Completed)    Allergen silver  birch IgE (Completed)    Allergen Tree White [place] IgE (Completed)    Allergen [place] Tree (Completed)    Allergen white pine IgE (Completed)    Allergen alecia white IgE (Completed)    Allergen [place] IgE (Completed)    Allergen cottonwood IgE (Completed)    Allergen elm IgE (Completed)    Allergen maple box elder IgE (Completed)    Allergen oak white IgE (Completed)    Allergen English plantain IgE (Completed)    Allergen giant ragweed IgE (Completed)    Allergen lamb's quarter IgE (Completed)    Allergen Mugwort IgE (Completed)    Allergen ragweed short IgE (Completed)    Allergen Sagebrush Wormwood IgE (Completed)    Allergen Sheep Sorrel IgE (Completed)    Allergen thistle Russian IgE (Completed)    Allergen Weed Nettle IgE (Completed)    Allergen, Kochia/Firebush (Completed)    Allergen rice IgE (Completed)    Allergen chicken IgE (Completed)    Allergen [place] Yeast IgE (Completed)    IgE for green chile: Laboratory Miscellaneous Order (Completed)    ALLERGY SKIN TESTS,ALLERGENS (Completed)    Allergen, Food, [place]     test [id number]   cpt  67311: Laboratory Miscellaneous Order (Completed)    ALLERGEN, FOOD, ANNATTO SEED IGE          TEST [id number]     CPT 96458: Laboratory Miscellaneous Order (Completed)    ALLERGEN, FOOD, BLACK BEAN IGE         [id number]       TEST CODE [id number]      CPT 83843: Laboratory Miscellaneous Order (Completed)    ALLERGEN, FOOD PEPPER C ANNUUM IGE    LG 6539    TEST CODE [id number]   CPT 81254: Laboratory Miscellaneous Order (Completed)    ALLERGEN, OCCUPATIONAL XANTHUM GUM IGE         SENT  TO [facility]        TEST CODE [id number]: Laboratory Miscellaneous Order (Completed)    Dermatographism     Noted on skin testing.            Relevant Medications    Fluocinonide (LIDEX EX)    albuterol (PROAIR HFA/PROVENTIL HFA/VENTOLIN HFA) 108 (90 BASE) MCG/ACT Inhaler       Infectious/Inflammatory    Rhinoconjunctivitis     Summer and fall nasal symptoms. Claritin somewhat helpful. No history of allergy testing. Symptoms made worse with mowing grass and raking leaves.     Skin testing:  Dermatographism noted. Demonstrated by positive normal saline and numerous other positive results. Unable to interpret skin testing. This will be scanned into computer.     - Serum IgE for environmental allergens.   - Zyrtec or Allegra as needed.   - If no improvement could add nasal steroid.          Relevant Orders    ALLERGY SKIN TESTS,ALLERGENS (Completed)          Chart documentation with Dragon Voice recognition Software. Although reviewed after completion, some words and grammatical errors may remain.    Floyd Maciel, DO   Allergy/Immunology  [facility], [place] and [facility]      Again, thank you for allowing me to participate in the care of your patient.        Sincerely,        Floyd Maciel, DO

## 2018-02-27 NOTE — PATIENT INSTRUCTIONS
Allergy Staff Appt Hours Shot Hours Locations    Physician     Floyd Maciel DO       Support Staff     Ronda BUTT RN      Melissa DAVIDSON MA  Monday:                      [place] 8-7     Tuesday:         [place] 8-5     Wednesday:        [place]: 7-5     Friday:        [place] 7-5   [place]        Monday: 9-5:50        Wednesday: 2-5:50        Friday: 7-12:50     [place]        Tuesday: 7-10:50        Thursday: 1:30-6:30     [place] Monday: 7:10-4:50        Tuesday: 12:30-6:30        Thursday: 7-11:50 [facility]  [address] [place] [address]  Appt Line: [phone]  Allergy RN (Monday):  [phone]    [facility]  [address] [place] [address]  Appt Line: [phone]  Allergy RN (Tues & Wed):  [phone]    [facility]  [address] [place] [address]  Appt Line: [phone]  Allergy RN (Friday):  [phone]       Important Scheduling Information  Aspirin Desensitization: Appt will last 2 clinic days. Please call the Allergy RN line for your clinic to schedule. Discontinue antihistamines 7 days prior to the appointment.     Food Challenges: Appt will last 3-4 hours. Please call the Allergy RN line for your clinic to schedule. Discontinue antihistamines 7 days prior to the appointment.     Penicillin Testing: Appt will last 2-3 hours. Please call the Allergy RN line for your clinic to schedule. Discontinue antihistamines 7 days prior to the appointment.     Skin Testing: Appt will about 40 minutes. Call the appointment line for your clinic to schedule. Discontinue antihistamines 7 days prior to the appointment.     Venom Testing: Appt will last 2-3 hours. Please call the Allergy RN line for your clinic to schedule. Discontinue antihistamines 7 days prior to the appointment.     Thank you for trusting us with your Allergy, Asthma, and Immunology care. Please feel free to contact us with any questions or concerns you may have.      - Albuterol 2-4  puffs inhaled (use a spacer unless using a Proair Respiclick device) every 4 hours as needed for chest tightness, wheezing, shortness of breath and/or coughing.   - Continue to avoid foods that may have been implicated in hive reaction: rice, chicken, green chiles, corn, howard beans, annatto, tomatillos, black beans, jalapenos, xanthum gum, yeast.  - If hives return take Zyrtec 10mg by mouth. If hives return keep hive diary of potential causes.   - Return to dermatology for potential patch testing.   - Continue Claritin or Zyrtec daily as needed for allergy symptoms.

## 2018-02-27 NOTE — PROGRESS NOTES
Mackenzie Worrell is a 47 year old White female with previous medical history significant for psoriasis, Crohn's disease, intermittent asthma. Mackenzie Worrell is being seen today for evaluation of allergies to food, asthma and seasonal allergies.     The patient reports that since high school she has had summer and fall rhinorrhea, nasal itching, sneezing, congestion and postnasal drainage.  She denies ocular symptoms.  Symptoms are made worse by mowing grass and raking leaves.  No history of allergy testing.  Claritin is 75% beneficial.  No use of nasal corticosteroids.  No use of nasal antihistamines.    Patient reports that in the fall she will develop wheezing and coughing.  This is been present since high school.  The symptoms are intermittent.  No nocturnal symptoms.  Albuterol has been used in the past and beneficial.  Symptoms are made worse additionally with cold air.  No history of prednisone use, ER visits or hospitalizations.  In the fall she will use her albuterol inhaler less than twice per week.    The patient reports that on 21 February she ate dinner at 7 PM and she noted hives and itching involving her right arm.  Treated with diphenhydramine and this was beneficial.  The following morning hives were completely resolved.  That night for dinner she consumed boxed enchiladas which contained rice, chicken, green chili, tomato, onion, corn, Castano beans, garlic, milk, green pepper, red pepper, annatto, xantham gum, black beans, jalapeno, wheat and yeast.  She subsequently has consumed and tolerated tomato, onions, garlic, milk, red pepper, green pepper, jalapeno and wheat.  Prior to that the patient reports another episodes of hives at which time she took a different brand of melatonin at 9 PM and woke up with hives at 4 AM the next day.  There were no foods associated with this reaction.  With either reaction there are no other medications which have been new.  No over-the-counter  supplements.  No new soaps, shampoos, detergents, fabric softeners, dryer sheets.  No relation to insect stings.  No prior history of hives. Hives not related with beef exposure. Hives not made worse with any physical trigger such as heat, cold, vibration or exercise.     She follows with dermatology for eczema and psoriasis. She follows with rheumatology. She follows with GI for Crohn's disease.       ENVIRONMENTAL HISTORY: [place] The [place] is heated with a forced air. They does have central air conditioning. The patient's bedroom is furnished with carpeting in bedroom and fabric window coverings.  Pets inside the house include 2 cat(s) and 1 dog(s). There is not history of cockroach or mice infestation. There is/are 0 smokers in the house.  The house does not have a damp basement.        ACT Total Scores 2/27/2018   ACT TOTAL SCORE -   ASTHMA ER VISITS -   ASTHMA HOSPITALIZATIONS -   ACT TOTAL SCORE (Goal Greater than or Equal to 20) 25   In the past 12 months, how many times did you visit the emergency room for your asthma without being admitted to the hospital? 0   In the past 12 months, how many times were you hospitalized overnight because of your asthma? 0     Past Medical History:   Diagnosis Date     Contact dermatitis and other eczema, due to unspecified cause     dishydrotic eczema/hands     Mild intermittent asthma      Osteoarthrosis, unspecified whether generalized or localized, unspecified site     arthritis     Regional enteritis of small intestine with large intestine (H)     Crohn's disease     Synovitis and tenosynovitis, unspecified     wrist tendonitis     Tobacco use disorder      Family History   Problem Relation Age of Onset     DIABETES Father      Type II     EYE* Father      Cataract Surgery, Glaucoma     DIABETES Paternal Grandmother      Type II     Anesthesia Reaction No family hx of      Past Surgical History:   Procedure Laterality Date     C  NONSPECIFIC PROCEDURE      laparoscopy for infertility workup/ adhesions     C RESECT SMALL INTEST,SINGL RESEC/ANAS      Resection, Partial Small Bowel     EXCISE MASS EAR EXTERNAL Right 5/23/2016    Procedure: EXCISE MASS EAR EXTERNAL;  Surgeon: Asif Akhtar MD;  Location: [facility]     HC COLONOSCOPY W BIOPSY  05/18/10     HC LAPAROSCOPY, SURGICAL; CHOLECYSTECTOMY      Cholecystectomy, Laparoscopic     HC REMOVAL OF TONSILS,<13 Y/O      Tonsils <13y.o.     HERNIA REPAIR, INCISIONAL  08/13/08    Incarcerated ventral incisional hernia.     LAPAROSCOPIC RESECTION SMALL BOWEL N/A 3/9/2016    Procedure: LAPAROSCOPIC RESECTION SMALL BOWEL;  Surgeon: Mark Francisco MD;  Location: [facility]     REMOVE FOREIGN BODY HAND Right 5/31/2017    Procedure: REMOVE FOREIGN BODY HAND;  exploration and excision/removal foreign body palmar days right little finger;  Surgeon: Benjamin Vital MD;  Location: [facility] OR       REVIEW OF SYSTEMS:  General: negative for weight gain. negative for weight loss. negative for changes in sleep.   Ears: negative for fullness. negative for hearing loss. negative for dizziness.   Nose: negative for snoring.negative for changes in smell. negative for drainage.   Eyes: negative for eye watering. negative for eye itching. negative for vision changes. negative for eye redness.  Throat: negative for hoarseness. negative for sore throat. negative for trouble swallowing.   Lungs: negative for shortness of breath.negative for wheezing. negative for sputum production.   Cardiovascular: negative for chest pain. negative for swelling of ankles. negative for fast or irregular heartbeat.   Gastrointestinal: negative for nausea. positive  for heartburn. negative for acid reflux.   Musculoskeletal: negative for joint pain. positive  for joint stiffness. negative for joint swelling.   Neurologic: negative for seizures. negative for fainting. negative for weakness.   Psychiatric: negative for changes in  "mood. negative for anxiety.   Endocrine: negative for cold intolerance. negative for heat intolerance. negative for tremors.   Lymphatic: negative for lower extremity swelling. negative for lymph node swelling.   Hematologic: negative for easy bruising. negative for easy bleeding.  Integumentary: positive  for rash. positive  for scaling. negative for nail changes.       Current Outpatient Prescriptions:      Fluocinonide (LIDEX EX), Apply 20 % topically as needed , Disp: , Rfl: 11     multivitamin, therapeutic with minerals (MULTI-VITAMIN) TABS tablet, Take 1 tablet by mouth daily, Disp: , Rfl:      albuterol (PROAIR HFA/PROVENTIL HFA/VENTOLIN HFA) 108 (90 BASE) MCG/ACT Inhaler, Inhale 2 puffs into the lungs every 4 hours as needed, Disp: 2 Inhaler, Rfl: 3     clobetasol (TEMOVATE) 0.05 % ointment, Apply topically 2 times daily, Disp: , Rfl:      colestipol (COLESTID) 1 G tablet, Take 1 g by mouth 2 times daily, Disp: , Rfl: 3     cyanocobalamin (VITAMIN B12) 1000 MCG/ML injection, INJECT 1 ML (1,000 MCG TOTAL) INTRAMUSCULARLY EVERY 2 WEEKS, Disp: , Rfl: 3     calcium carbonate (OS- MG [place]. [place]) 500 MG tablet, , Disp: , Rfl:      Syringe/Needle, Disp, 25G X 5/8\" 3 ML MISC, For use with B12 inj, Disp: , Rfl:      adalimumab (HUMIRA) 40 MG/0.8ML pen kit, , Disp: , Rfl:      melatonin 3 MG tablet, Take 3 mg by mouth nightly as needed for sleep, Disp: , Rfl:      senna-docusate (SENOKOT-S;PERICOLACE) 8.6-50 MG per tablet, Take 1-2 tablets by mouth 2 times daily Take while on oral narcotics to prevent or treat constipation. (Patient not taking: Reported on 2/27/2018), Disp: 30 tablet, Rfl: 0     albuterol (2.5 MG/3ML) 0.083% nebulizer solution, Take 1 vial (2.5 mg) by nebulization every 4 hours as needed for shortness of breath / dyspnea or wheezing (Patient not taking: Reported on 2/27/2018), Disp: 60 vial, Rfl: 0     IBUPROFEN PO, Take 400 mg by mouth, Disp: , Rfl:      loratadine 10 MG capsule, Take 10 mg by " mouth daily seasonally, Disp: , Rfl:   No current facility-administered medications for this visit.     Facility-Administered Medications Ordered in Other Visits:      albuterol (PROAIR HFA, PROVENTIL HFA, VENTOLIN HFA) inhaler, , , PRN, Chani Valerio, RITESH CRNA, 8 puff at 03/09/16 1330  Immunization History   Administered Date(s) Administered     Influenza (IIV3) PF 10/13/1999, 12/26/2000, 10/10/2001, 10/02/2009     Influenza Vaccine IM 3yrs+ 4 Valent IIV4 12/01/2016     Influenza Vaccine, 3 YRS +, IM (QUADRIVALENT W/PRESERVATIVES) 11/24/2014     Mantoux Tuberculin Skin Test 04/27/2005     TD (ADULT, 7+) 09/22/2000     TDAP Vaccine (Adacel) 10/01/2014     Allergies   Allergen Reactions     No Known Drug Allergies          EXAM:   Constitutional:  Appears well-developed and well-nourished. No distress.   HEENT:   Head: Normocephalic.   Right Ear: External ear normal. TM normal  Left Ear: External ear normal. TM normal  Mouth/Throat: No oropharyngeal exudate present.   No cobblestoning of posterior oropharynx.   Nasal tissue pink and normal appearing.  No rhinorrhea noted.    Eyes: Conjunctivae are non-erythematous   No maxillary or frontal sinus tenderness to palpation.   Cardiovascular: Normal rate, regular rhythm and normal heart sounds. Exam reveals no gallop and no friction rub.   No murmur heard.  Respiratory: Effort normal and breath sounds normal. No respiratory distress. No wheezes. No rales.   Musculoskeletal: Normal range of motion.   Lymphadenopathy:   No cervical adenopathy.   No lower extremity edema.   Neuro: Oriented to person, place, and time.  Skin: Erythematous, dry, flaky rash noted on sole of bilateral feet.   Psychiatric: Normal mood and affect.     Nursing note and vitals reviewed.      WORKUP:   Skin testing  Dermatographism noted. Demonstrated by positive normal saline and numerous other positive results. Unable to interpret skin testing. This will be scanned into computer.      Spirometry-pre  FVC % pred:96  FEV1 % pred:80  FEV1/FVC % act:67  FEF 25-75% pred:41    Spirometry-post  FVC % pred:88  FEV1 % pred:81  FEV1/FVC % act:74  FEF 25-75% pred:59    Non-significant improvement in FEV1.         ASSESSMENT/PLAN:  Problem List Items Addressed This Visit        Respiratory    Mild intermittent asthma - Primary     Flares in the fall only. Albuterol helpful. Spirometry with mild obstruction. Treated with albuterol and no significant improvement in FEV1 ACT 25.     - Albuterol 2-4 puffs inhaled (use a spacer unless using a Proair Respiclick device) every 4 hours as needed for chest tightness, wheezing, shortness of breath and/or coughing.   - Avoid asthma triggers.  - Allergy blood testing. Skin testing not reliable given dermatographism.              Relevant Medications    albuterol (PROAIR HFA/PROVENTIL HFA/VENTOLIN HFA) 108 (90 BASE) MCG/ACT Inhaler    Other Relevant Orders    Spirometry, Breathing Capacity (Completed)    ALBUTEROL UNIT DOSE, 1 MG (Completed)    Allergen cat epithellium IgE (Completed)    Allergen dog epithelium IgE (Completed)    Allergen Rahul grass IgE (Completed)    Allergen michelle IgE (Completed)    Allergen D pteronyssinus IgE (Completed)    Allergen D farinae IgE (Completed)    Allergen alternaria alternata IgE (Completed)    Allergen aspergillus fumigatus IgE (Completed)    Allergen cladosporium herbarum IgE (Completed)    Allergen Epicoccum purpurascens IgE (Completed)    Allergen penicillium notatum IgE (Completed)    Allergen Red [place] IgE (Completed)    Allergen silver  birch IgE (Completed)    Allergen Tree White [place] IgE (Completed)    Allergen [place] Tree (Completed)    Allergen white pine IgE (Completed)    Allergen alecia white IgE (Completed)    Allergen Cecil IgE (Completed)    Allergen cottonwood IgE (Completed)    Allergen elm IgE (Completed)    Allergen maple box elder IgE (Completed)    Allergen oak white IgE (Completed)    Allergen  English plantain IgE (Completed)    Allergen giant ragweed IgE (Completed)    Allergen lamb's quarter IgE (Completed)    Allergen Mugwort IgE (Completed)    Allergen ragweed short IgE (Completed)    Allergen Sagebrush Wormwood IgE (Completed)    Allergen Sheep Sorrel IgE (Completed)    Allergen thistle Russian IgE (Completed)    Allergen Weed Nettle IgE (Completed)    Allergen, Kochia/Firebush (Completed)    ALLERGY SKIN TESTS,ALLERGENS (Completed)    [facility] Miscellaneous Test (Completed)    BRONCHODILATION RESPONSE, PRE/POST ADMIN    [facility] Miscellaneous Test (Completed)    [facility] Miscellaneous Test (Completed)    [facility] Miscellaneous Test (Completed)    [facility] Miscellaneous Test (Completed)    Send outs misc test (Completed)       Musculoskeletal and Integumentary    Hives     Hives occurring of 2 occasions and brief in duration and responsive to benadryl. One reaction after eating a enchilada that was boxed and one possibly related to melatonin. Skin testing with dermatographism and cannot interpret today.     - Serum IgE for foods implicated. She will review melatonin ingredients and let us know. If hives return she will keep hive diary. No other triggers immediately identified via history.   - Continue to avoid potential implicated foods.   - if hives return use Zyrtec or Allegra as needed.   - If food allergies will provided anaphylaxis action plan and injectable epinephrine.          Relevant Medications    Fluocinonide (LIDEX EX)    albuterol (PROAIR HFA/PROVENTIL HFA/VENTOLIN HFA) 108 (90 BASE) MCG/ACT Inhaler    Other Relevant Orders    Allergen cat epithellium IgE (Completed)    Allergen dog epithelium IgE (Completed)    Allergen Rahul grass IgE (Completed)    Allergen michelle IgE (Completed)    Allergen D pteronyssinus IgE (Completed)    Allergen D farinae IgE (Completed)    Allergen alternaria alternata IgE (Completed)    Allergen aspergillus fumigatus IgE (Completed)    Allergen cladosporium herbarum IgE  (Completed)    Allergen Epicoccum purpurascens IgE (Completed)    Allergen penicillium notatum IgE (Completed)    Allergen Red [place] IgE (Completed)    Allergen silver  birch IgE (Completed)    Allergen Tree White [place] IgE (Completed)    Allergen [place] Tree (Completed)    Allergen white pine IgE (Completed)    Allergen alecia white IgE (Completed)    Allergen Iron IgE (Completed)    Allergen cottonwood IgE (Completed)    Allergen elm IgE (Completed)    Allergen maple box elder IgE (Completed)    Allergen oak white IgE (Completed)    Allergen English plantain IgE (Completed)    Allergen giant ragweed IgE (Completed)    Allergen lamb's quarter IgE (Completed)    Allergen Mugwort IgE (Completed)    Allergen ragweed short IgE (Completed)    Allergen Sagebrush Wormwood IgE (Completed)    Allergen Sheep Sorrel IgE (Completed)    Allergen thistle Russian IgE (Completed)    Allergen Weed Nettle IgE (Completed)    Allergen, Kochia/Firebush (Completed)    Allergen rice IgE (Completed)    Allergen chicken IgE (Completed)    Allergen [place] Yeast IgE (Completed)    IgE for green chile: Laboratory Miscellaneous Order (Completed)    ALLERGY SKIN TESTS,ALLERGENS (Completed)    Allergen, Food, [place]     test [id number]   cpt  08703: Laboratory Miscellaneous Order (Completed)    ALLERGEN, FOOD, ANNATTO SEED IGE          TEST [id number]     CPT 93010: Laboratory Miscellaneous Order (Completed)    ALLERGEN, FOOD, BLACK BEAN IGE         [id number]       TEST CODE [id number]      CPT 36183: Laboratory Miscellaneous Order (Completed)    ALLERGEN, FOOD PEPPER C ANNUUM IGE    LG [id number]    TEST CODE [id number]   CPT 82281: Laboratory Miscellaneous Order (Completed)    ALLERGEN, OCCUPATIONAL XANTHUM GUM IGE         SENT TO [facility]        TEST CODE [id number]: Laboratory Miscellaneous Order (Completed)    Dermatographism     Noted on skin testing.            Relevant Medications    Fluocinonide (LIDEX EX)    albuterol (PROAIR HFA/PROVENTIL HFA/VENTOLIN  HFA) 108 (90 BASE) MCG/ACT Inhaler       Infectious/Inflammatory    Rhinoconjunctivitis     Summer and fall nasal symptoms. Claritin somewhat helpful. No history of allergy testing. Symptoms made worse with mowing grass and raking leaves.     Skin testing:  Dermatographism noted. Demonstrated by positive normal saline and numerous other positive results. Unable to interpret skin testing. This will be scanned into computer.     - Serum IgE for environmental allergens.   - Zyrtec or Allegra as needed.   - If no improvement could add nasal steroid.          Relevant Orders    ALLERGY SKIN TESTS,ALLERGENS (Completed)          Chart documentation with Dragon Voice recognition Software. Although reviewed after completion, some words and grammatical errors may remain.    Floyd Maciel,    Allergy/Immunology  [facility], [place] and [facility]

## 2018-02-27 NOTE — ASSESSMENT & PLAN NOTE
Flares in the fall only. Albuterol helpful. Spirometry with mild obstruction. Treated with albuterol and no significant improvement in FEV1 ACT 25.     - Albuterol 2-4 puffs inhaled (use a spacer unless using a Proair Respiclick device) every 4 hours as needed for chest tightness, wheezing, shortness of breath and/or coughing.   - Avoid asthma triggers.  - Allergy blood testing. Skin testing not reliable given dermatographism.

## 2018-02-27 NOTE — NURSING NOTE
Per verbal order,  the following nebulizer treatment was given:     MEDICATION: Albuterol Sulfate 2.5 mg  : [facility]  LOT #: [id number]  EXPIRATION DATE:  Feb 2019  NDC # 9553-0171-71    Ronda Monsalve RN

## 2018-02-27 NOTE — MR AVS SNAPSHOT
After Visit Summary   2/27/2018    Mackenzie Worrell    MRN: [id number]           Patient Information     Date Of Birth          1970        Visit Information        Provider Department      2/27/2018 8:20 AM Floyd Maciel DO [facility]        Today's Diagnoses     Mild intermittent asthma without complication    -  1    Hives          Care Instructions    Allergy Staff Appt Hours Shot Hours Locations    Physician     Floyd Maciel DO       Support Staff     FUENTES Hilario MA  Monday:                      [place] 8-7     Tuesday:         [place] 8-5     Wednesday:        [place]: 7-5     Friday:        [place] 7-5   [place] Monday: 9-5:50        Wednesday: 2-5:50        Friday: 7-12:50     [place]        Tuesday: 7-10:50        Thursday: 1:30-6:30     [place] Monday: 7:10-4:50        Tuesday: 12:30-6:30        Thursday: 7-11:50 [facility]  [id number] [place] [address]  Appt Line: [phone]  Allergy RN (Monday):  [phone]    [facility]  [address] [place] [address]  Appt Line: [phone]  Allergy RN (Tues & Wed):  [phone]    [facility]  [address] [place] [address]  Appt Line: [phone]  Allergy RN (Friday):  [phone]       Important Scheduling Information  Aspirin Desensitization: Appt will last 2 clinic days. Please call the Allergy RN line for your clinic to schedule. Discontinue antihistamines 7 days prior to the appointment.     Food Challenges: Appt will last 3-4 hours. Please call the Allergy RN line for your clinic to schedule. Discontinue antihistamines 7 days prior to the appointment.     Penicillin Testing: Appt will last 2-3 hours. Please call the Allergy RN line for your clinic to schedule. Discontinue antihistamines 7 days prior to the appointment.     Skin Testing: Appt will about 40 minutes. Call the appointment line for your clinic to schedule.  Discontinue antihistamines 7 days prior to the appointment.     Venom Testing: Appt will last 2-3 hours. Please call the Allergy RN line for your clinic to schedule. Discontinue antihistamines 7 days prior to the appointment.     Thank you for trusting us with your Allergy, Asthma, and Immunology care. Please feel free to contact us with any questions or concerns you may have.      - Albuterol 2-4 puffs inhaled (use a spacer unless using a Proair Respiclick device) every 4 hours as needed for chest tightness, wheezing, shortness of breath and/or coughing.   - Continue to avoid foods that may have been implicated in hive reaction: rice, chicken, green chiles, corn, howard beans, annatto, tomatillos, black beans, jalapenos, xanthum gum, yeast.  - If hives return take Zyrtec 10mg by mouth. If hives return keep hive diary of potential causes.   - Return to dermatology for potential patch testing.   - Continue Claritin or Zyrtec daily as needed for allergy symptoms.           Follow-ups after your visit        Your next 10 appointments already scheduled     Mar 05, 2018   Procedure with Manoj Damon MD   [facility] ([facility])    [address] [place] [id number] [phone]              Who to contact     If you have questions or need follow up information about today's clinic visit or your schedule please contact [facility] directly at [phone].  Normal or non-critical lab and imaging results will be communicated to you by MyChart, letter or phone within 4 business days after the clinic has received the results. If you do not hear from us within 7 days, please contact the clinic through MyChart or phone. If you have a critical or abnormal lab result, we will notify you by phone as soon as possible.  Submit refill requests through [employer] or call your pharmacy and they will forward the refill request to us. Please allow 3 business days for your  "refill to be completed.          Additional Information About Your Visit        Pop Up [employer] Travel Information     [employer] lets you send messages to your doctor, view your test results, renew your prescriptions, schedule appointments and more. To sign up, go to [email or web address] . Click on \"Log in\" on the left side of the screen, which will take you to the Welcome page. Then click on \"Sign up Now\" on the right side of the page.     You will be asked to enter the access code listed below, as well as some personal information. Please follow the directions to create your username and password.     Your access code is: [email or web address]  Expires: 2018 10:19 AM     Your access code will  in 90 days. If you need help or a new code, please call your [place] clinic or [phone].        Care EveryWhere ID     This is your Care EveryWhere ID. This could be used by other organizations to access your [place] medical records  [id number]        Your Vitals Were     Pulse Temperature Respirations Height Pulse Oximetry BMI (Body Mass Index)    96 98  F (36.7  C) (Oral) 20 1.676 m (5' 6\") 99% 34.86 kg/m2       Blood Pressure from Last 3 Encounters:   18 130/84   17 126/85   17 122/82    Weight from Last 3 Encounters:   18 98 kg (216 lb)   17 98.9 kg (218 lb)   17 98.9 kg (218 lb)              We Performed the Following     ALBUTEROL UNIT DOSE, 1 MG     Allergen alternaria alternata IgE     Allergen alecia white IgE     Allergen aspergillus fumigatus IgE     Allergen Brewers Yeast IgE     Allergen cat epithellium IgE     Allergen Cedar IgE     Allergen chicken IgE     Allergen cladosporium herbarum IgE     Allergen cottonwood IgE     Allergen D farinae IgE     Allergen D pteronyssinus IgE     Allergen dog epithelium IgE     Allergen elm IgE     Allergen English plantain IgE     Allergen Epicoccum purpurascens IgE     Allergen giant ragweed IgE     Allergen Rahul grass IgE     Allergen lamb's " quarter IgE     Allergen maple box elder IgE     Allergen Mugwort IgE     Allergen oak white IgE     Allergen penicillium notatum IgE     Allergen ragweed short IgE     Allergen Red Condon IgE     Allergen rice IgE     Allergen Sagebrush Wormwood IgE     Allergen Sheep Sorrel IgE     Allergen silver  birch IgE     Allergen thistle Russian IgE     Allergen michelle IgE     Allergen Tree White Condon IgE     Allergen Braxton Tree     Allergen Weed Nettle IgE     Allergen white pine IgE     Allergen, Kochia/Firebush     IgE for green chile: Laboratory Miscellaneous Order     Spirometry, Breathing Capacity          Today's Medication Changes          These changes are accurate as of 2/27/18 10:19 AM.  If you have any questions, ask your nurse or doctor.               These medicines have changed or have updated prescriptions.        Dose/Directions    * albuterol (2.5 MG/3ML) 0.083% neb solution   This may have changed:  Another medication with the same name was added. Make sure you understand how and when to take each.   Used for:  Acute bronchitis with coexisting condition requiring prophylactic treatment   Changed by:  Floyd Maciel DO        Dose:  1 vial   Take 1 vial (2.5 mg) by nebulization every 4 hours as needed for shortness of breath / dyspnea or wheezing   Quantity:  60 vial   Refills:  0       * albuterol 108 (90 BASE) MCG/ACT Inhaler   Commonly known as:  PROAIR HFA/PROVENTIL HFA/VENTOLIN HFA   This may have changed:  You were already taking a medication with the same name, and this prescription was added. Make sure you understand how and when to take each.   Used for:  Mild intermittent asthma without complication   Changed by:  Floyd Maciel DO        Dose:  2 puff   Inhale 2 puffs into the lungs every 4 hours as needed   Quantity:  2 Inhaler   Refills:  3       * Notice:  This list has 2 medication(s) that are the same as other medications prescribed for you. Read the directions carefully,  and ask your doctor or other care provider to review them with you.         Where to get your medicines      These medications were sent to [id number] - [place], [place] - [address] [address]  [address] [address], [facility] [address]     Phone:  [phone]     albuterol 108 (90 BASE) MCG/ACT Inhaler                Primary Care Provider Office Phone # Fax #    Sean Rushtripp,  [phone]       [address] [place] [address]        Equal Access to Services     [facility]: Hadii aad ku hadasho Soomaali, waaxda luqadaha, qaybta kaalmada adeegyada, waxay idiin hayjessn justin larose . So [facility] [phone].    ATENCIÓN: Si habla español, tiene a fletcher disposición servicios gratuitos de asistencia lingüística. [facility] [phone].    We comply with applicable federal civil rights laws and [place] laws. We do not discriminate on the basis of race, color, national origin, age, disability, sex, sexual orientation, or gender identity.            Thank you!     Thank you for choosing [facility]  for your care. Our goal is always to provide you with excellent care. Hearing back from our patients is one way we can continue to improve our services. Please take a few minutes to complete the written survey that you may receive in the mail after your visit with us. Thank you!             Your Updated Medication List - Protect others around you: Learn how to safely use, store and throw away your medicines at www.disposemymeds.org.          This list is accurate as of 2/27/18 10:19 AM.  Always use your most recent med list.                   Brand Name Dispense Instructions for use Diagnosis    adalimumab 40 MG/0.8ML pen kit    HUMIRA          * albuterol (2.5 MG/3ML) 0.083% neb solution     60 vial    Take 1 vial (2.5 mg) by nebulization every 4 hours as needed for shortness of breath / dyspnea or wheezing    Acute bronchitis with coexisting condition requiring  "prophylactic treatment       * albuterol 108 (90 BASE) MCG/ACT Inhaler    PROAIR HFA/PROVENTIL HFA/VENTOLIN HFA    2 Inhaler    Inhale 2 puffs into the lungs every 4 hours as needed    Mild intermittent asthma without complication       calcium carbonate 1250 MG tablet    OS- mg [place]. Ca          clobetasol 0.05 % ointment    TEMOVATE     Apply topically 2 times daily        colestipol 1 G tablet    COLESTID     Take 1 g by mouth 2 times daily        cyanocobalamin 1000 MCG/ML injection    VITAMIN B12     INJECT 1 ML (1,000 MCG TOTAL) INTRAMUSCULARLY EVERY 2 WEEKS        IBUPROFEN PO      Take 400 mg by mouth        LIDEX EX      Apply 20 % topically as needed        loratadine 10 MG capsule      Take 10 mg by mouth daily seasonally        melatonin 3 MG tablet      Take 3 mg by mouth nightly as needed for sleep        Multi-vitamin Tabs tablet      Take 1 tablet by mouth daily        senna-docusate 8.6-50 MG per tablet    SENOKOT-S;PERICOLACE    30 tablet    Take 1-2 tablets by mouth 2 times daily Take while on oral narcotics to prevent or treat constipation.    Foreign body in hand, right, subsequent encounter       Syringe/Needle (Disp) 25G X 5/8\" 3 ML Misc      For use with B12 inj        * Notice:  This list has 2 medication(s) that are the same as other medications prescribed for you. Read the directions carefully, and ask your doctor or other care provider to review them with you.      "

## 2018-02-28 LAB
BAKER'S YEAST IGE QN: <0.1 KU/L
CALIF WALNUT IGE QN: <0.1 KU/L
DEPRECATED MISC ALLERGEN IGE RAST QL: NORMAL

## 2018-02-28 ASSESSMENT — ASTHMA QUESTIONNAIRES: ACT_TOTALSCORE: 25

## 2018-03-01 LAB
A ALTERNATA IGE QN: <0.1 KU(A)/L
A FUMIGATUS IGE QN: <0.1 KU(A)/L
C HERBARUM IGE QN: <0.1 KU(A)/L
CAT DANDER IGG QN: <0.1 KU(A)/L
CEDAR IGE QN: <0.1 KU(A)/L
CHICKEN MEAT IGE QN: <0.1 KU(A)/L
COMMON RAGWEED IGE QN: <0.1 KU(A)/L
COTTONWOOD IGE QN: <0.1 KU(A)/L
D FARINAE IGE QN: <0.1 KU(A)/L
D PTERONYSS IGE QN: <0.1 KU(A)/L
DEPRECATED MISC ALLERGEN IGE RAST QL: NORMAL
DEPRECATED MISC ALLERGEN IGE RAST QL: NORMAL
DOG DANDER+EPITH IGE QN: <0.1 KU(A)/L
E PURPURASCENS IGE QN: <0.1 KU(A)/L
EAST WHITE PINE IGE QN: <0.1 KU(A)/L
ENGL PLANTAIN IGE QN: <0.1 KU(A)/L
FIREBUSH IGE QN: <0.1 KU(A)/L
GIANT RAGWEED IGE QN: <0.1 KU(A)/L
GOOSEFOOT IGE QN: <0.1 KU(A)/L
JOHNSON GRASS IGE QN: <0.1 KU(A)/L
MAPLE IGE QN: <0.1 KU(A)/L
MUGWORT IGE QN: <0.1 KU(A)/L
NETTLE IGE QN: <0.1 KU(A)/L
P NOTATUM IGE QN: <0.1 KU(A)/L
RED MULBERRY IGE QN: <0.1 KU(A)/L
RESULT: NORMAL
RICE IGE QN: <0.1 KU(A)/L
SALTWORT IGE QN: <0.1 KU(A)/L
SEND OUTS MISC TEST CODE: NORMAL
SEND OUTS MISC TEST SPECIMEN: NORMAL
SHEEP SORREL IGE QN: <0.1 KU(A)/L
SILVER BIRCH IGE QN: <0.1 KU(A)/L
TEST NAME: NORMAL
TIMOTHY IGE QN: <0.1 KU(A)/L
WHITE ASH IGE QN: <0.1 KU(A)/L
WHITE ELM IGE QN: <0.1 KU(A)/L
WHITE MULBERRY IGE QN: <0.1
WHITE OAK IGE QN: <0.1 KU(A)/L
WORMWOOD IGE QN: <0.1 KU(A)/L

## 2018-03-02 LAB
RESULT: NORMAL
RESULT: NORMAL
SEND OUTS MISC TEST CODE: NORMAL
SEND OUTS MISC TEST CODE: NORMAL
SEND OUTS MISC TEST SPECIMEN: NORMAL
SEND OUTS MISC TEST SPECIMEN: NORMAL
TEST NAME: NORMAL
TEST NAME: NORMAL

## 2018-03-04 NOTE — PROGRESS NOTES
Please call the patient and inform that all food testing is normal. The environmental allergy testing is negative as well. I do not think that anything she ate or melatonin was to blame. If hives return please treat as we discussed and keep a diary of potential causes of hives. Thanks.     Dr. Maciel

## 2018-03-07 LAB — LAB SCANNED RESULT: NORMAL

## 2018-03-16 ENCOUNTER — TELEPHONE (OUTPATIENT)
Dept: SURGERY | Facility: CLINIC | Age: 48
End: 2018-03-16

## 2018-03-21 NOTE — TELEPHONE ENCOUNTER
Patient called back to schedule her colonoscopy. Orders faxed from "[employer] ([place]). Dx: crohn's disease of both small and large intestine with other complication. Patient is scheduled 4/9/2018 @ 0 w/Itzel, arriving @ 630. Please mail prep.

## 2018-04-06 ENCOUNTER — TRANSFERRED RECORDS (OUTPATIENT)
Dept: HEALTH INFORMATION MANAGEMENT | Facility: CLINIC | Age: 48
End: 2018-04-06

## 2018-04-09 ENCOUNTER — SURGERY (OUTPATIENT)
Age: 48
End: 2018-04-09

## 2018-04-09 ENCOUNTER — HOSPITAL ENCOUNTER (OUTPATIENT)
Facility: CLINIC | Age: 48
Discharge: HOME | End: 2018-04-09
Attending: INTERNAL MEDICINE | Admitting: INTERNAL MEDICINE
Payer: COMMERCIAL

## 2018-04-09 VITALS
TEMPERATURE: 97.9 F | RESPIRATION RATE: 18 BRPM | DIASTOLIC BLOOD PRESSURE: 70 MMHG | SYSTOLIC BLOOD PRESSURE: 114 MMHG | OXYGEN SATURATION: 96 %

## 2018-04-09 LAB
COLONOSCOPY: NORMAL
HCG UR QL: NEGATIVE

## 2018-04-09 PROCEDURE — 40000296 ZZH STATISTIC ENDO RECOVERY CLASS 1:2 FIRST HOUR: Performed by: INTERNAL MEDICINE

## 2018-04-09 PROCEDURE — 88305 TISSUE EXAM BY PATHOLOGIST: CPT | Performed by: INTERNAL MEDICINE

## 2018-04-09 PROCEDURE — 45385 COLONOSCOPY W/LESION REMOVAL: CPT | Mod: PT | Performed by: INTERNAL MEDICINE

## 2018-04-09 PROCEDURE — 25000125 ZZHC RX 250: Performed by: INTERNAL MEDICINE

## 2018-04-09 PROCEDURE — 45380 COLONOSCOPY AND BIOPSY: CPT | Performed by: INTERNAL MEDICINE

## 2018-04-09 PROCEDURE — 88305 TISSUE EXAM BY PATHOLOGIST: CPT | Mod: 26,59 | Performed by: INTERNAL MEDICINE

## 2018-04-09 PROCEDURE — 81025 URINE PREGNANCY TEST: CPT | Performed by: INTERNAL MEDICINE

## 2018-04-09 PROCEDURE — 25000128 H RX IP 250 OP 636: Performed by: INTERNAL MEDICINE

## 2018-04-09 RX ORDER — ONDANSETRON 2 MG/ML
4 INJECTION INTRAMUSCULAR; INTRAVENOUS
Status: DISCONTINUED | OUTPATIENT
Start: 2018-04-09 | End: 2018-04-09 | Stop reason: HOSPADM

## 2018-04-09 RX ORDER — FENTANYL CITRATE 50 UG/ML
INJECTION, SOLUTION INTRAMUSCULAR; INTRAVENOUS PRN
Status: DISCONTINUED | OUTPATIENT
Start: 2018-04-09 | End: 2018-04-09 | Stop reason: HOSPADM

## 2018-04-09 RX ORDER — LIDOCAINE 40 MG/G
CREAM TOPICAL
Status: DISCONTINUED | OUTPATIENT
Start: 2018-04-09 | End: 2018-04-09 | Stop reason: HOSPADM

## 2018-04-09 RX ADMIN — FENTANYL CITRATE 50 MCG: 50 INJECTION, SOLUTION INTRAMUSCULAR; INTRAVENOUS at 07:38

## 2018-04-09 RX ADMIN — MIDAZOLAM 1 MG: 1 INJECTION INTRAMUSCULAR; INTRAVENOUS at 07:39

## 2018-04-09 RX ADMIN — MIDAZOLAM 1 MG: 1 INJECTION INTRAMUSCULAR; INTRAVENOUS at 07:41

## 2018-04-09 RX ADMIN — LIDOCAINE HYDROCHLORIDE 1 ML: 10 INJECTION, SOLUTION EPIDURAL; INFILTRATION; INTRACAUDAL at 07:04

## 2018-04-09 RX ADMIN — MIDAZOLAM 1 MG: 1 INJECTION INTRAMUSCULAR; INTRAVENOUS at 07:47

## 2018-04-09 RX ADMIN — MIDAZOLAM 2 MG: 1 INJECTION INTRAMUSCULAR; INTRAVENOUS at 07:38

## 2018-04-09 RX ADMIN — MIDAZOLAM 1 MG: 1 INJECTION INTRAMUSCULAR; INTRAVENOUS at 07:40

## 2018-04-09 RX ADMIN — FENTANYL CITRATE 50 MCG: 50 INJECTION, SOLUTION INTRAMUSCULAR; INTRAVENOUS at 07:42

## 2018-04-09 NOTE — LETTER
April 13, 2018      Mackenzie Worrell  [address] [place] [address]        Dear ,    We are writing to inform you of your test results.    Biopsy results are all benign. You should follow-up with gastroenterology as scheduled in the upcoming couple weeks.     Resulted Orders   COLONOSCOPY   Result Value Ref Range    COLONOSCOPY       [facility]  [address] [facility] [place] [address] [phone]     Endoscopy Department  _______________________________________________________________________________  Patient Name: Mackenzie Worrell    Procedure Date: 4/9/2018 7:09 AM  MRN: [id number]                       Account Number: [id number]  YOB: 1970              Admit Type: Outpatient  Age: 47                               Room: Room 1  Gender: Female                        Note Status: Finalized  Attending MD: Marc Robbins MD     Total Sedation Time:   _______________________________________________________________________________     Procedure:           Colonoscopy  Indications:         Follow-up of Crohn's disease  Providers:           Marc Robbins MD  Referring MD:        Sean Alvarenga MD  Requesting Provider: Ayleen Stearns MD  Medicines:           Fentanyl 100 micrograms IV, Midazolam 6 mg IV  Complications:       No immediate complications.  _______________ ________________________________________________________________  Procedure:           Pre-Anesthesia Assessment:                       - Prior to the procedure, a History and Physical was                        performed, and patient medications, allergies and                        sensitivities were reviewed. The patient's tolerance of                        previous anesthesia was reviewed.                       - The risks and benefits of the procedure and the                        sedation options and risks were discussed with the                        patient. All  questions were answered and informed                        consent was obtained.                       After obtaining informed consent, the colonoscope was                        passed under direct vision. Throughout the procedure,                        the patient's blood pressure, pulse, and oxygen                        saturations were monitored continuously. The Colonoscope                        was introdu princess through the anus and advanced to the                        ileocolonic anastomosis. The colonoscopy was performed                        without difficulty. The patient tolerated the procedure                        well. The quality of the bowel preparation was good.                                                                                   Findings:       A 6 mm polyp was found in the transverse colon. The polyp was sessile.        The polyp was removed with a cold snare. Resection and retrieval were        complete.       A 4 mm polyp was found in the sigmoid colon. The polyp was sessile. The        polyp was removed with a cold snare. Resection and retrieval were        complete.       A 3 mm polyp was found in the rectum. The polyp was semi-sessile. The        polyp was removed with a cold biopsy forceps. Resection and retrieval        were complete.       There was evidence of a prior end-to-side ileo-colonic anastomosis in        the ascending colon. This was charact erized by congestion and        ulceration. The anastomosis was not traversed as it was stenotic        Biopsies were taken with a cold forceps for histology.                                                                                   Impression:          - One 6 mm polyp in the transverse colon, removed with a                        cold snare. Resected and retrieved.                       - One 4 mm polyp in the sigmoid colon, removed with a                        cold snare. Resected and retrieved.                        - One 3 mm polyp in the rectum, removed with a cold                        biopsy forceps. Resected and retrieved.                       - End-to-side ileo-colonic anastomosis, characterized by                        congestion, ulceration and stenosis. Biopsied.  Recommendation:      - Await pathology results.                       - Will forward this on to Ayleen Stearns.                       - Return to GI clinic in 2-4 weeks.                                                                                      __________________  Marc Robbins MD  4/9/2018 8:10:57 AM  I was physically present for the entire viewing portion of the exam.Marc Robbins MD  Number of Addenda: 0    Note Initiated On: 4/9/2018 7:09 AM         If you have any questions or concerns, please call the clinic at the number listed above.       Sincerely,      Sean Alvarenga, DO

## 2018-04-09 NOTE — H&P
[facility] GI Pre-Procedure Physical Assessment    Mackenzie Worrell MRN# [id number]   Age: 47 year old YOB: 1970      Date of Surgery: 4/9/2018  Location [facility]      Date of Exam 4/9/2018 Facility (Same day)       Home clinic: [facility]  Primary care provider: Sean Alvarenga         Active problem list:   Patient Active Problem List   Diagnosis     Regional enteritis of small intestine with large intestine (H)     Mild intermittent asthma     Tobacco use disorder     Contact dermatitis and other eczema, due to unspecified cause     Inflammatory arthritis     CARDIOVASCULAR SCREENING; LDL GOAL LESS THAN 160     Effusion of ankle     Stress fracture of ankle     Crohn's disease (H)     S/P colectomy     Adalimumab (Humira) long-term use     Morbid obesity due to excess calories (H)     Foreign body reaction right hand.     Rhinoconjunctivitis     Hives     Dermatographism            Medications (include herbals and vitamins):   Any Plavix use in the last 7 days?  No     Current Facility-Administered Medications   Medication     lidocaine 1 % 1 mL     lidocaine (LMX4) kit     sodium chloride (PF) 0.9% PF flush 3 mL     sodium chloride (PF) 0.9% PF flush 3 mL     ondansetron (ZOFRAN) injection 4 mg     Facility-Administered Medications Ordered in Other Encounters   Medication     albuterol (PROAIR HFA, PROVENTIL HFA, VENTOLIN HFA) inhaler             Allergies:      Allergies   Allergen Reactions     No Known Drug Allergies      Allergy to Latex?  No  Allergy to tape?    No          Social History:     Social History   Substance Use Topics     Smoking status: Current Every Day Smoker     Packs/day: 0.75     Years: 25.00     Types: Cigarettes     Smokeless tobacco: Never Used      Comment: 1/2 pack daily, started age 20     Alcohol use 0.0 oz/week     0 Standard drinks or equivalent per week      Comment: rare occasion            Physical Exam:    All vitals have been reviewed  Blood pressure (!) 142/94, temperature 97.9  F (36.6  C), temperature source Oral, resp. rate 16, last menstrual period 02/23/2018, SpO2 98 %, not currently breastfeeding.  Airway assessment:   Patient is able to open mouth wide  Patient is able to stick out tongue      Lungs:   No increased work of breathing, good air exchange, clear to auscultation bilaterally, no crackles or wheezing      Cardiovascular:   Normal apical impulse, regular rate and rhythm, normal S1 and S2, no S3 or S4, and no murmur noted           Lab / Radiology Results:   All laboratory data reviewed          Assessment:   Appropriately NPO  Chief complaint or anatomic assessment of involved area: crohns         Plan:   Moderate (conscious) sedation     Patient's active problems diagnostically and therapeutically optimized for the planned procedure  Risks, benefits, alternatives to sedation and blood explained and consent obtained  Risks, benefits, alternatives to procedure explained and consent obtained  P2 (patient with mild systemic disease)  Orders and progress notes are in the chart  Discharge from Phase 1 and / or Phase 2 recovery when patient meets criteria    I have reviewed the history and physical, lab finding(s), diagnostic data, medicaitons, and the plan for sedation.  I have determined this patient to be an appropriate candidate for the planned sedation / procedure and have reassessed the patient immediately prior to sedation / procedure.    I have personally and medically directed the administration of medications used.    Marc Robbins MD

## 2018-04-10 LAB — COPATH REPORT: NORMAL

## 2018-04-13 NOTE — PROGRESS NOTES
Please contact the patient and notify her of the following:  Biopsy results are all benign.  Patient should follow-up with gastroenterology as scheduled in the upcoming couple weeks.    Thank you.   DO TAMARA Jean-Baptiste

## 2018-04-24 ENCOUNTER — OFFICE VISIT (OUTPATIENT)
Dept: URGENT CARE | Facility: RETAIL CLINIC | Age: 48
End: 2018-04-24
Payer: COMMERCIAL

## 2018-04-24 VITALS
HEART RATE: 89 BPM | SYSTOLIC BLOOD PRESSURE: 123 MMHG | DIASTOLIC BLOOD PRESSURE: 82 MMHG | TEMPERATURE: 98 F | OXYGEN SATURATION: 98 %

## 2018-04-24 DIAGNOSIS — J30.2 ACUTE SEASONAL ALLERGIC RHINITIS, UNSPECIFIED TRIGGER: Primary | ICD-10-CM

## 2018-04-24 DIAGNOSIS — J98.01 ACUTE BRONCHOSPASM: ICD-10-CM

## 2018-04-24 PROCEDURE — 99213 OFFICE O/P EST LOW 20 MIN: CPT | Performed by: PHYSICIAN ASSISTANT

## 2018-04-24 RX ORDER — ALBUTEROL SULFATE 90 UG/1
2 AEROSOL, METERED RESPIRATORY (INHALATION) EVERY 4 HOURS PRN
Qty: 1 INHALER | Refills: 0 | Status: SHIPPED | OUTPATIENT
Start: 2018-04-24

## 2018-04-24 NOTE — MR AVS SNAPSHOT
After Visit Summary   4/24/2018    Mackenzie Worrell    MRN: [id number]           Patient Information     Date Of Birth          1970        Visit Information        Provider Department      4/24/2018 12:30 PM Palak Quintero PA-C [facility]        Today's Diagnoses     Acute seasonal allergic rhinitis, unspecified trigger    -  1    Acute bronchospasm          Care Instructions    Albuterol 2 puffs ever 4-6 hours as needed for shortness of breath/wheezing.  Flonase (fluticasone) 2 sprays in each nostril daily until symptoms resolve, then continue 1 spray in each nostril for at least 5 more days.  Take an antihistamine such as Claritin (loratadine), Zyrtec (cetirizine) or Allegra (fexofenadine) daily for allergy symptoms.  Sudafed (pseudoephedrine) behind the pharmacist counter for 3-5 days helps relieve congestion.  Take Tylenol or an NSAID such as ibuprofen or naproxen as needed for pain.    May use netti pot with bottled or distilled water and saline packets to flush sinuses.  Afrin (oxymetazoline) nasal spray twice daily for 3 days. Stop after 3 days.  Mucinex (guiafenesin) thins mucus and may help it to loosen more quickly  Saline drops or nasal sprays may loosen mucus.  Sit in the bathroom with the door closed and hot shower running to loosen mucus.  Nasal congestion often starts clear then turns yellow or green towards the end- this is not a sign of a bacterial infection.  Contact primary care clinic if you do not have any relief from your symptoms after 10 days.  Present to emergency room for significantly increasing pain, persistent high fever >102F, swelling/redness around your eyes, changes in your vision or ability to move your eyes, altered mental status or a severe headache.          Follow-ups after your visit        Who to contact     You can reach your care team any time of the day by calling [phone].  Notification of test results:  If you have  "an abnormal lab result, we will notify you by phone as soon as possible.         Additional Information About Your Visit        BECChart Information     [employer] lets you send messages to your doctor, view your test results, renew your prescriptions, schedule appointments and more. To sign up, go to [email or web address] . Click on \"Log in\" on the left side of the screen, which will take you to the Welcome page. Then click on \"Sign up Now\" on the right side of the page.     You will be asked to enter the access code listed below, as well as some personal information. Please follow the directions to create your username and password.     Your access code is: [email or web address]  Expires: 2018 11:19 AM     Your access code will  in 90 days. If you need help or a new code, please call your [facility] clinic or [phone].        Care EveryWhere ID     This is your Care EveryWhere ID. This could be used by other organizations to access your [facility] medical records  [id number]        Your Vitals Were     Pulse Temperature Pulse Oximetry             89 98  F (36.7  C) (Tympanic) 98%          Blood Pressure from Last 3 Encounters:   18 123/82   18 114/70   18 130/84    Weight from Last 3 Encounters:   18 216 lb (98 kg)   17 218 lb (98.9 kg)   17 218 lb (98.9 kg)              Today, you had the following     No orders found for display         Today's Medication Changes          These changes are accurate as of 18 12:47 PM.  If you have any questions, ask your nurse or doctor.               These medicines have changed or have updated prescriptions.        Dose/Directions    * albuterol (2.5 MG/3ML) 0.083% neb solution   This may have changed:  Another medication with the same name was added. Make sure you understand how and when to take each.   Used for:  Acute bronchitis with coexisting condition requiring prophylactic treatment        Dose:  1 vial   Take 1 vial (2.5 mg) by " nebulization every 4 hours as needed for shortness of breath / dyspnea or wheezing   Quantity:  60 vial   Refills:  0       * albuterol 108 (90 Base) MCG/ACT Inhaler   Commonly known as:  PROAIR HFA/PROVENTIL HFA/VENTOLIN HFA   This may have changed:  Another medication with the same name was added. Make sure you understand how and when to take each.   Used for:  Mild intermittent asthma without complication        Dose:  2 puff   Inhale 2 puffs into the lungs every 4 hours as needed   Quantity:  2 Inhaler   Refills:  3       * albuterol 108 (90 Base) MCG/ACT Inhaler   Commonly known as:  PROAIR HFA/PROVENTIL HFA/VENTOLIN HFA   This may have changed:  You were already taking a medication with the same name, and this prescription was added. Make sure you understand how and when to take each.   Used for:  Acute bronchospasm        Dose:  2 puff   Inhale 2 puffs into the lungs every 4 hours as needed for shortness of breath / dyspnea or wheezing   Quantity:  1 Inhaler   Refills:  0       * Notice:  This list has 3 medication(s) that are the same as other medications prescribed for you. Read the directions carefully, and ask your doctor or other care provider to review them with you.         Where to get your medicines      These medications were sent to [id number] - [place], [place] - [address]  [address] [address] [address]     Phone:  [phone]     albuterol 108 (90 Base) MCG/ACT Inhaler                Primary Care Provider Office Phone # Fax #    Sean Krzysztof Alvarenga [phone]       [address] [place] [address]        Equal Access to Services     [facility]: Hadii aad ku hadasho Soomaali, waaxda luqadaha, qaybta kaalmada adeegyada, jeremy mirza hayguanako larose . So [facility] [phone].    ATENCIÓN: Si habla español, tiene a fletcher disposición servicios gratuitos de asistencia lingüística. Llame al [phone].    We comply with applicable federal  civil rights laws and [place] laws. We do not discriminate on the basis of race, color, national origin, age, disability, sex, sexual orientation, or gender identity.            Thank you!     Thank you for choosing [facility]  for your care. Our goal is always to provide you with excellent care. Hearing back from our patients is one way we can continue to improve our services. Please take a few minutes to complete the written survey that you may receive in the mail after your visit with us. Thank you!             Your Updated Medication List - Protect others around you: Learn how to safely use, store and throw away your medicines at www.disposemymeds.org.          This list is accurate as of 4/24/18 12:47 PM.  Always use your most recent med list.                   Brand Name Dispense Instructions for use Diagnosis    adalimumab 40 MG/0.8ML pen kit    HUMIRA          * albuterol (2.5 MG/3ML) 0.083% neb solution     60 vial    Take 1 vial (2.5 mg) by nebulization every 4 hours as needed for shortness of breath / dyspnea or wheezing    Acute bronchitis with coexisting condition requiring prophylactic treatment       * albuterol 108 (90 Base) MCG/ACT Inhaler    PROAIR HFA/PROVENTIL HFA/VENTOLIN HFA    2 Inhaler    Inhale 2 puffs into the lungs every 4 hours as needed    Mild intermittent asthma without complication       * albuterol 108 (90 Base) MCG/ACT Inhaler    PROAIR HFA/PROVENTIL HFA/VENTOLIN HFA    1 Inhaler    Inhale 2 puffs into the lungs every 4 hours as needed for shortness of breath / dyspnea or wheezing    Acute bronchospasm       calcium carbonate 1250 MG tablet    OS- mg [place]. [place]          clobetasol 0.05 % ointment    TEMOVATE     Apply topically 2 times daily        colestipol 1 g tablet    COLESTID     Take 1 g by mouth 2 times daily        cyanocobalamin 1000 MCG/ML injection    VITAMIN B12     INJECT 1 ML (1,000 MCG TOTAL) INTRAMUSCULARLY EVERY 2 WEEKS        IBUPROFEN  "PO      Take 400 mg by mouth        LIDEX EX      Apply 20 % topically as needed        loratadine 10 MG capsule      Take 10 mg by mouth daily seasonally        melatonin 3 MG tablet      Take 3 mg by mouth nightly as needed for sleep        Multi-vitamin Tabs tablet      Take 1 tablet by mouth daily        Syringe/Needle (Disp) 25G X 5/8\" 3 ML Misc      For use with B12 inj        * Notice:  This list has 3 medication(s) that are the same as other medications prescribed for you. Read the directions carefully, and ask your doctor or other care provider to review them with you.      "

## 2018-04-24 NOTE — PATIENT INSTRUCTIONS
Albuterol 2 puffs ever 4-6 hours as needed for shortness of breath/wheezing.  Flonase (fluticasone) 2 sprays in each nostril daily until symptoms resolve, then continue 1 spray in each nostril for at least 5 more days.  Take an antihistamine such as Claritin (loratadine), Zyrtec (cetirizine) or Allegra (fexofenadine) daily for allergy symptoms.  Sudafed (pseudoephedrine) behind the pharmacist counter for 3-5 days helps relieve congestion.  Take Tylenol or an NSAID such as ibuprofen or naproxen as needed for pain.    May use netti pot with bottled or distilled water and saline packets to flush sinuses.  Afrin (oxymetazoline) nasal spray twice daily for 3 days. Stop after 3 days.  Mucinex (guiafenesin) thins mucus and may help it to loosen more quickly  Saline drops or nasal sprays may loosen mucus.  Sit in the bathroom with the door closed and hot shower running to loosen mucus.  Nasal congestion often starts clear then turns yellow or green towards the end- this is not a sign of a bacterial infection.  Contact primary care clinic if you do not have any relief from your symptoms after 10 days.  Present to emergency room for significantly increasing pain, persistent high fever >102F, swelling/redness around your eyes, changes in your vision or ability to move your eyes, altered mental status or a severe headache.

## 2018-04-24 NOTE — PROGRESS NOTES
Chief Complaint   Patient presents with     Cough     x 2 days, sometimes productive, no fevers     Sinus Problem     since friday, sinus drainage, congestion, pain and pressure     SUBJECTIVE:  Mackenzie Worrell is a 47 year old female here with concerns about sinus infection.  She states onset of symptoms was 5 days ago.    Course of illness is worsening.   Severity moderate  She has had maxillary pressure as well as nasal congestion with post nasal drip and a cough and headache  Predisposing factors include asthma, seasonal allergies. Smoker. Using Humira.  Recent treatment has included: OTC meds    Past Medical History:   Diagnosis Date     Contact dermatitis and other eczema, due to unspecified cause     dishydrotic eczema/hands     Mild intermittent asthma      Osteoarthrosis, unspecified whether generalized or localized, unspecified site     arthritis     Regional enteritis of small intestine with large intestine (H)     Crohn's disease     Synovitis and tenosynovitis, unspecified     wrist tendonitis     Tobacco use disorder      Current Outpatient Prescriptions   Medication Sig Dispense Refill     adalimumab (HUMIRA) 40 MG/0.8ML pen kit        albuterol (2.5 MG/3ML) 0.083% nebulizer solution Take 1 vial (2.5 mg) by nebulization every 4 hours as needed for shortness of breath / dyspnea or wheezing 60 vial 0     albuterol (PROAIR HFA/PROVENTIL HFA/VENTOLIN HFA) 108 (90 Base) MCG/ACT Inhaler Inhale 2 puffs into the lungs every 4 hours as needed for shortness of breath / dyspnea or wheezing 1 Inhaler 0     calcium carbonate (OS- MG [place]. [place]) 500 MG tablet        colestipol (COLESTID) 1 G tablet Take 1 g by mouth 2 times daily  3     cyanocobalamin (VITAMIN B12) 1000 MCG/ML injection INJECT 1 ML (1,000 MCG TOTAL) INTRAMUSCULARLY EVERY 2 WEEKS  3     loratadine 10 MG capsule Take 10 mg by mouth daily seasonally       melatonin 3 MG tablet Take 3 mg by mouth nightly as needed for sleep        "multivitamin, therapeutic with minerals (MULTI-VITAMIN) TABS tablet Take 1 tablet by mouth daily       Syringe/Needle, Disp, 25G X 5/8\" 3 ML MISC For use with B12 inj       albuterol (PROAIR HFA/PROVENTIL HFA/VENTOLIN HFA) 108 (90 BASE) MCG/ACT Inhaler Inhale 2 puffs into the lungs every 4 hours as needed (Patient not taking: Reported on 4/24/2018) 2 Inhaler 3     clobetasol (TEMOVATE) 0.05 % ointment Apply topically 2 times daily       Fluocinonide (LIDEX EX) Apply 20 % topically as needed   11     IBUPROFEN PO Take 400 mg by mouth       Social History   Substance Use Topics     Smoking status: Current Every Day Smoker     Packs/day: 0.75     Years: 25.00     Types: Cigarettes     Smokeless tobacco: Never Used      Comment: 1/2 pack daily, started age 20     Alcohol use 0.0 oz/week     0 Standard drinks or equivalent per week      Comment: rare occasion     Allergies   Allergen Reactions     No Known Drug Allergies      ROS:  Review of systems negative except as stated above.    OBJECTIVE:  /82 (BP Location: Left arm)  Pulse 89  Temp 98  F (36.7  C) (Tympanic)  SpO2 98%  GENERAL APPEARANCE: healthy, alert and no distress  EYES: PERRL, conjunctiva clear  HENT: Pain with palpation over frontal and maxillary sinuses. Ear canals normal TMs with mild serous effusions bilaterally. Nasal turbinates edematous and boggy with a blue hue bilaterally. Posterior pharynx is not erythematous.  NECK: supple, nontender, no lymphadenopathy  RESP: lungs with mild diffuse wheezing to auscultation - no rales, rhonchi. Breathing is comfortable without use of accessory muscles.  CV: regular rates and rhythm, normal S1 S2, no murmur noted    ASSESSMENT:    ICD-10-CM    1. Acute seasonal allergic rhinitis, unspecified trigger J30.2    2. Acute bronchospasm J98.01 albuterol (PROAIR HFA/PROVENTIL HFA/VENTOLIN HFA) 108 (90 Base) MCG/ACT Inhaler     PLAN:   Patient Instructions   Albuterol 2 puffs ever 4-6 hours as needed for " shortness of breath/wheezing.  Flonase (fluticasone) 2 sprays in each nostril daily until symptoms resolve, then continue 1 spray in each nostril for at least 5 more days.  Take an antihistamine such as Claritin (loratadine), Zyrtec (cetirizine) or Allegra (fexofenadine) daily for allergy symptoms.  Sudafed (pseudoephedrine) behind the pharmacist counter for 3-5 days helps relieve congestion.  Take Tylenol or an NSAID such as ibuprofen or naproxen as needed for pain.    May use netti pot with bottled or distilled water and saline packets to flush sinuses.  Afrin (oxymetazoline) nasal spray twice daily for 3 days. Stop after 3 days.  Mucinex (guiafenesin) thins mucus and may help it to loosen more quickly  Saline drops or nasal sprays may loosen mucus.  Sit in the bathroom with the door closed and hot shower running to loosen mucus.  Nasal congestion often starts clear then turns yellow or green towards the end- this is not a sign of a bacterial infection.  Contact primary care clinic if you do not have any relief from your symptoms after 10 days.  Present to emergency room for significantly increasing pain, persistent high fever >102F, swelling/redness around your eyes, changes in your vision or ability to move your eyes, altered mental status or a severe headache.    Follow up with primary care provider with any problems, questions or concerns or if symptoms worsen or fail to improve. Patient agreed to plan and verbalized understanding.    Carmella Quintero PA-C  [facility] [place]

## 2018-04-24 NOTE — NURSING NOTE
"Chief Complaint   Patient presents with     Cough     x 2 days, sometimes productive, no fevers     Sinus Problem     since friday, sinus drainage, congestion, pain and pressure       Initial /82 (BP Location: Left arm)  Pulse 89  Temp 98  F (36.7  C) (Tympanic)  SpO2 98% Estimated body mass index is 34.86 kg/(m^2) as calculated from the following:    Height as of 2/27/18: 5' 6\" (1.676 m).    Weight as of 2/27/18: 216 lb (98 kg).  Medication Reconciliation: complete    "

## 2018-05-10 ENCOUNTER — TELEPHONE (OUTPATIENT)
Dept: FAMILY MEDICINE | Facility: OTHER | Age: 48
End: 2018-05-10

## 2018-05-10 NOTE — TELEPHONE ENCOUNTER
Panel Management Review      Patient has the following on her problem list: None      Composite cancer screening  Chart review shows that this patient is due/due soon for the following Pap Smear  Summary:    Patient is due/failing the following:   AAP and PAP    Action needed:   Patient needs office visit for pap  .    Type of outreach:    Sent letter.    Questions for provider review:    None                                                                                                                                    Michelle RAMIREZ         Chart routed to Care Team .

## 2018-05-10 NOTE — LETTER
My Asthma Action Plan  Name: Mackenzie Worrell   YOB: 1970  Date: 5/10/2018   My doctor: Sean Alvarenga, DO   My clinic: [facility]        My Control Medicine:   My Rescue Medicine:    My Asthma Severity:   Avoid your asthma triggers:            GREEN ZONE   Good Control    I feel good    No cough or wheeze    Can work, sleep and play without asthma symptoms       Take your asthma control medicine every day.     1. If exercise triggers your asthma, take your rescue medication    15 minutes before exercise or sports, and    During exercise if you have asthma symptoms  2. Spacer to use with inhaler: If you have a spacer, make sure to use it with your inhaler             YELLOW ZONE Getting Worse  I have ANY of these:    I do not feel good    Cough or wheeze    Chest feels tight    Wake up at night   1. Keep taking your Green Zone medications  2. Start taking your rescue medicine:    every 20 minutes for up to 1 hour. Then every 4 hours for 24-48 hours.  3. If you stay in the Yellow Zone for more than 12-24 hours, contact your doctor.  4. If you do not return to the Green Zone in 12-24 hours or you get worse, start taking your oral steroid medicine if prescribed by your provider.           RED ZONE Medical Alert - Get Help  I have ANY of these:    I feel awful    Medicine is not helping    Breathing getting harder    Trouble walking or talking    Nose opens wide to breathe       1. Take your rescue medicine NOW  2. If your provider has prescribed an oral steroid medicine, start taking it NOW  3. Call your doctor NOW  4. If you are still in the Red Zone after 20 minutes and you have not reached your doctor:    Take your rescue medicine again and    Call 911 or go to the emergency room right away    See your regular doctor within 2 weeks of an Emergency Room or Urgent Care visit for follow-up treatment.          Annual Reminders:  Meet with Asthma Educator,  Flu Shot in the  Fall, consider Pneumonia Vaccination for patients with asthma (aged 19 and older).    Pharmacy:    [id number] - [place], [place] - [address] [address]  [facility] - [place], [place] - [address]  [facility] - [place], [place] - [address]                      Asthma Triggers  How To Control Things That Make Your Asthma Worse    Triggers are things that make your asthma worse.  Look at the list below to help you find your triggers and what you can do about them.  You can help prevent asthma flare-ups by staying away from your triggers.      Trigger                                                          What you can do   Cigarette Smoke  Tobacco smoke can make asthma worse. Do not allow smoking in your home, car or around you.  Be sure no one smokes at a child s day care or school.  If you smoke, ask your health care provider for ways to help you quit.  Ask family members to quit too.  Ask your health care provider for a referral to Quit Plan to help you quit smoking, or call 1-555-202-PLAN.     Colds, Flu, Bronchitis  These are common triggers of asthma. Wash your hands often.  Don t touch your eyes, nose or mouth.  Get a flu shot every year.     Dust Mites  These are tiny bugs that live in cloth or carpet. They are too small to see. Wash sheets and blankets in hot water every week.   Encase pillows and mattress in dust mite proof covers.  Avoid having carpet if you can. If you have carpet, vacuum weekly.   Use a dust mask and HEPA vacuum.   Pollen and Outdoor Mold  Some people are allergic to trees, grass, or weed pollen, or molds. Try to keep your windows closed.  Limit time out doors when pollen count is high.   Ask you health care provider about taking medicine during allergy season.     Animal Dander  Some people are allergic to skin flakes, urine or saliva from pets with fur or feathers. Keep pets with fur or feathers out of your home.    If you can t keep the pet outdoors, then keep the pet  out of your bedroom.  Keep the bedroom door closed.  Keep pets off cloth furniture and away from stuffed toys.     Mice, Rats, and Cockroaches  Some people are allergic to the waste from these pests.   Cover food and garbage.  Clean up spills and food crumbs.  Store grease in the refrigerator.   Keep food out of the bedroom.   Indoor Mold  This can be a trigger if your home has high moisture. Fix leaking faucets, pipes, or other sources of water.   Clean moldy surfaces.  Dehumidify basement if it is damp and smelly.   Smoke, Strong Odors, and Sprays  These can reduce air quality. Stay away from strong odors and sprays, such as perfume, powder, hair spray, paints, smoke incense, paint, cleaning products, candles and new carpet.   Exercise or Sports  Some people with asthma have this trigger. Be active!  Ask your doctor about taking medicine before sports or exercise to prevent symptoms.    Warm up for 5-10 minutes before and after sports or exercise.     Other Triggers of Asthma  Cold air:  Cover your nose and mouth with a scarf.  Sometimes laughing or crying can be a trigger.  Some medicines and food can trigger asthma.

## 2018-05-10 NOTE — LETTER
[facility]  [address] [facility] [address]  [phone]        Mackenzie Worrell  [address] [address]      May 10, 2018      Dear Mackenzie,    I care about your health and have reviewed your health plan, including your medical conditions, medication list, and lab results and am making recommendations based on this review, to better manage your health.    You are in particular need of attention regarding:  -Cervical Cancer Screening    I am recommending that you:  -schedule a PAP SMEAR EXAM. This is a screening test done during a pelvic exam to check for abnormal changes in the cells of the cervix.  Cervical cancer is preventable and curable if abnormal cells are detected and treated early. You can call your clinic at the number listed above to schedule your Pap Smear.      If you've had the preventative screening completed at another facility or feel you're not due for this screening, please call our clinic at the number listed above or send us a My Chart message so we can update our records. We would like to thank you in advance for taking the time to take care of your health.  If you have any questions, please don t hesitate to contact our clinic.    Sincerely,       Your [facility] Team

## 2018-05-11 DIAGNOSIS — Z79.899 HIGH RISK MEDICATION USE: ICD-10-CM

## 2018-05-11 DIAGNOSIS — K50.818 CROHN'S DISEASE OF BOTH SMALL AND LARGE INTESTINE WITH OTHER COMPLICATION (H): Primary | ICD-10-CM

## 2018-07-03 DIAGNOSIS — Z79.899 HIGH RISK MEDICATION USE: ICD-10-CM

## 2018-07-03 DIAGNOSIS — K50.818 CROHN'S DISEASE OF BOTH SMALL AND LARGE INTESTINE WITH OTHER COMPLICATION (H): ICD-10-CM

## 2018-07-03 LAB
ALBUMIN SERPL-MCNC: 3.5 G/DL (ref 3.4–5)
ALP SERPL-CCNC: 70 U/L (ref 40–150)
ALT SERPL W P-5'-P-CCNC: 22 U/L (ref 0–50)
AST SERPL W P-5'-P-CCNC: 21 U/L (ref 0–45)
BASOPHILS # BLD AUTO: 0.1 10E9/L (ref 0–0.2)
BASOPHILS NFR BLD AUTO: 0.6 %
BILIRUB DIRECT SERPL-MCNC: 0.1 MG/DL (ref 0–0.2)
BILIRUB SERPL-MCNC: 0.4 MG/DL (ref 0.2–1.3)
DIFFERENTIAL METHOD BLD: ABNORMAL
EOSINOPHIL # BLD AUTO: 0.2 10E9/L (ref 0–0.7)
EOSINOPHIL NFR BLD AUTO: 2.1 %
ERYTHROCYTE [DISTWIDTH] IN BLOOD BY AUTOMATED COUNT: 15.5 % (ref 10–15)
HCT VFR BLD AUTO: 37.2 % (ref 35–47)
HGB BLD-MCNC: 12.8 G/DL (ref 11.7–15.7)
LYMPHOCYTES # BLD AUTO: 3.6 10E9/L (ref 0.8–5.3)
LYMPHOCYTES NFR BLD AUTO: 43.3 %
MCH RBC QN AUTO: 34.4 PG (ref 26.5–33)
MCHC RBC AUTO-ENTMCNC: 34.4 G/DL (ref 31.5–36.5)
MCV RBC AUTO: 100 FL (ref 78–100)
MONOCYTES # BLD AUTO: 0.5 10E9/L (ref 0–1.3)
MONOCYTES NFR BLD AUTO: 5.7 %
NEUTROPHILS # BLD AUTO: 4 10E9/L (ref 1.6–8.3)
NEUTROPHILS NFR BLD AUTO: 48.3 %
PLATELET # BLD AUTO: 287 10E9/L (ref 150–450)
PROT SERPL-MCNC: 7 G/DL (ref 6.8–8.8)
RBC # BLD AUTO: 3.72 10E12/L (ref 3.8–5.2)
VIT B12 SERPL-MCNC: 397 PG/ML (ref 193–986)
WBC # BLD AUTO: 8.3 10E9/L (ref 4–11)

## 2018-07-03 PROCEDURE — 80076 HEPATIC FUNCTION PANEL: CPT | Performed by: PHYSICIAN ASSISTANT

## 2018-07-03 PROCEDURE — 82607 VITAMIN B-12: CPT | Performed by: PHYSICIAN ASSISTANT

## 2018-07-03 PROCEDURE — 85025 COMPLETE CBC W/AUTO DIFF WBC: CPT | Performed by: PHYSICIAN ASSISTANT

## 2018-07-03 PROCEDURE — 36415 COLL VENOUS BLD VENIPUNCTURE: CPT | Performed by: PHYSICIAN ASSISTANT

## 2018-07-13 DIAGNOSIS — K50.80 CROHN'S DISEASE OF BOTH SMALL AND LARGE INTESTINE WITHOUT COMPLICATION (H): Primary | ICD-10-CM

## 2018-09-10 ENCOUNTER — TRANSFERRED RECORDS (OUTPATIENT)
Dept: HEALTH INFORMATION MANAGEMENT | Facility: CLINIC | Age: 48
End: 2018-09-10

## 2019-01-06 ENCOUNTER — OFFICE VISIT (OUTPATIENT)
Dept: URGENT CARE | Facility: RETAIL CLINIC | Age: 49
End: 2019-01-06
Payer: COMMERCIAL

## 2019-01-06 VITALS — SYSTOLIC BLOOD PRESSURE: 119 MMHG | DIASTOLIC BLOOD PRESSURE: 81 MMHG | TEMPERATURE: 98.5 F | HEART RATE: 90 BPM

## 2019-01-06 DIAGNOSIS — J20.9 ACUTE BRONCHITIS WITH COEXISTING CONDITION REQUIRING PROPHYLACTIC TREATMENT: Primary | ICD-10-CM

## 2019-01-06 PROCEDURE — 99213 OFFICE O/P EST LOW 20 MIN: CPT | Performed by: FAMILY MEDICINE

## 2019-01-06 RX ORDER — CEPHALEXIN 500 MG/1
500 CAPSULE ORAL 2 TIMES DAILY
Qty: 20 CAPSULE | Refills: 0 | Status: SHIPPED | OUTPATIENT
Start: 2019-01-06 | End: 2019-01-16

## 2019-01-06 NOTE — PROGRESS NOTES
SUBJECTIVE:  Mackenzie Worrell is a 48 year old female who presents to the clinic today with a chief complaint of cough  and wheezing. for 1 week(s).  Her cough is described as persistent and productive of yellow sputum.    The patient's symptoms are moderate and worsening.  Associated symptoms include malaise. The patient's symptoms are exacerbated by cold air and lying down  Patient has been using inhaler and OTC cough suppressants  to improve symptoms.    Past Medical History:   Diagnosis Date     Contact dermatitis and other eczema, due to unspecified cause     dishydrotic eczema/hands     Mild intermittent asthma      Osteoarthrosis, unspecified whether generalized or localized, unspecified site     arthritis     Regional enteritis of small intestine with large intestine (H)     Crohn's disease     Synovitis and tenosynovitis, unspecified     wrist tendonitis     Tobacco use disorder      Current Outpatient Medications   Medication Sig Dispense Refill     adalimumab (HUMIRA) 40 MG/0.8ML pen kit        albuterol (2.5 MG/3ML) 0.083% nebulizer solution Take 1 vial (2.5 mg) by nebulization every 4 hours as needed for shortness of breath / dyspnea or wheezing 60 vial 0     albuterol (PROAIR HFA/PROVENTIL HFA/VENTOLIN HFA) 108 (90 Base) MCG/ACT Inhaler Inhale 2 puffs into the lungs every 4 hours as needed for shortness of breath / dyspnea or wheezing 1 Inhaler 0     albuterol (PROAIR HFA/PROVENTIL HFA/VENTOLIN HFA) 108 (90 BASE) MCG/ACT Inhaler Inhale 2 puffs into the lungs every 4 hours as needed 2 Inhaler 3     calcium carbonate (OS- MG [place]. [place]) 500 MG tablet        cephALEXin (KEFLEX) 500 MG capsule Take 1 capsule (500 mg) by mouth 2 times daily for 10 days 20 capsule 0     clobetasol (TEMOVATE) 0.05 % ointment Apply topically 2 times daily       colestipol (COLESTID) 1 G tablet Take 1 g by mouth 2 times daily  3     cyanocobalamin (VITAMIN B12) 1000 MCG/ML injection INJECT 1 ML (1,000 MCG TOTAL)  "INTRAMUSCULARLY EVERY 2 WEEKS  3     Fluocinonide (LIDEX EX) Apply 20 % topically as needed   11     IBUPROFEN PO Take 400 mg by mouth       loratadine 10 MG capsule Take 10 mg by mouth daily seasonally       melatonin 3 MG tablet Take 3 mg by mouth nightly as needed for sleep       multivitamin, therapeutic with minerals (MULTI-VITAMIN) TABS tablet Take 1 tablet by mouth daily       Syringe/Needle, Disp, 25G X 5/8\" 3 ML MISC For use with B12 inj       albuterol (PROAIR HFA, PROVENTIL HFA, VENTOLIN HFA) 108 (90 BASE) MCG/ACT inhaler Inhale 2 puffs into the lungs every 6 hours as needed for shortness of breath / dyspnea for 30 days. 1 Inhaler 0     History   Smoking Status     Current Every Day Smoker     Packs/day: 0.75     Years: 25.00     Types: Cigarettes   Smokeless Tobacco     Never Used     Comment: 1/2 pack daily, started age 20       ROS  Review of systems negative except as stated above.    OBJECTIVE:  /81   Pulse 90   Temp 98.5  F (36.9  C) (Oral)   GENERAL APPEARANCE: no distress and cooperative  EYES: EOMI,  PERRL, conjunctiva clear  HENT: ear canals and TM's normal.  Nose and mouth without ulcers, erythema or lesions  NECK: supple, nontender, no lymphadenopathy  RESP: rhonchi L mid posterior and L lower posterior  CV: regular rates and rhythm, normal S1 S2, no murmur noted  ABDOMEN:  soft, nontender, no HSM or masses and bowel sounds normal  NEURO: Normal strength and tone, sensory exam grossly normal,  normal speech and mentation  SKIN: no suspicious lesions or rashes    ASSESSMENT:    Acute Bronchitis  Cigarette smoker    PLAN:  No orders of the defined types were placed in this encounter.    Symptomatic measures encouraged, humidified air, plenty of fluids.  Follow up with primary care provider if no improvement.  "

## 2019-01-21 DIAGNOSIS — K50.818 CROHN'S DISEASE OF BOTH SMALL AND LARGE INTESTINE WITH OTHER COMPLICATION (H): ICD-10-CM

## 2019-01-21 DIAGNOSIS — Z79.899 HIGH RISK MEDICATION USE: ICD-10-CM

## 2019-01-21 LAB
ALBUMIN SERPL-MCNC: 3.4 G/DL (ref 3.4–5)
ALP SERPL-CCNC: 72 U/L (ref 40–150)
ALT SERPL W P-5'-P-CCNC: 20 U/L (ref 0–50)
AST SERPL W P-5'-P-CCNC: 18 U/L (ref 0–45)
BASOPHILS # BLD AUTO: 0.1 10E9/L (ref 0–0.2)
BASOPHILS NFR BLD AUTO: 0.6 %
BILIRUB DIRECT SERPL-MCNC: <0.1 MG/DL (ref 0–0.2)
BILIRUB SERPL-MCNC: 0.3 MG/DL (ref 0.2–1.3)
DIFFERENTIAL METHOD BLD: ABNORMAL
EOSINOPHIL # BLD AUTO: 0.2 10E9/L (ref 0–0.7)
EOSINOPHIL NFR BLD AUTO: 2.1 %
ERYTHROCYTE [DISTWIDTH] IN BLOOD BY AUTOMATED COUNT: 13.6 % (ref 10–15)
HCT VFR BLD AUTO: 37.3 % (ref 35–47)
HGB BLD-MCNC: 12.4 G/DL (ref 11.7–15.7)
LYMPHOCYTES # BLD AUTO: 4.1 10E9/L (ref 0.8–5.3)
LYMPHOCYTES NFR BLD AUTO: 47.9 %
MCH RBC QN AUTO: 34.9 PG (ref 26.5–33)
MCHC RBC AUTO-ENTMCNC: 33.2 G/DL (ref 31.5–36.5)
MCV RBC AUTO: 105 FL (ref 78–100)
MONOCYTES # BLD AUTO: 0.5 10E9/L (ref 0–1.3)
MONOCYTES NFR BLD AUTO: 6 %
NEUTROPHILS # BLD AUTO: 3.8 10E9/L (ref 1.6–8.3)
NEUTROPHILS NFR BLD AUTO: 43.4 %
PLATELET # BLD AUTO: 240 10E9/L (ref 150–450)
PROT SERPL-MCNC: 6.9 G/DL (ref 6.8–8.8)
RBC # BLD AUTO: 3.55 10E12/L (ref 3.8–5.2)
WBC # BLD AUTO: 8.6 10E9/L (ref 4–11)

## 2019-01-21 PROCEDURE — 36415 COLL VENOUS BLD VENIPUNCTURE: CPT | Performed by: PHYSICIAN ASSISTANT

## 2019-01-21 PROCEDURE — 85025 COMPLETE CBC W/AUTO DIFF WBC: CPT | Performed by: PHYSICIAN ASSISTANT

## 2019-01-21 PROCEDURE — 80076 HEPATIC FUNCTION PANEL: CPT | Performed by: PHYSICIAN ASSISTANT

## 2019-01-24 ENCOUNTER — MEDICAL CORRESPONDENCE (OUTPATIENT)
Dept: HEALTH INFORMATION MANAGEMENT | Facility: CLINIC | Age: 49
End: 2019-01-24

## 2019-02-06 DIAGNOSIS — Z79.899 HIGH RISK MEDICATION USE: Primary | ICD-10-CM

## 2019-02-07 ENCOUNTER — TRANSFERRED RECORDS (OUTPATIENT)
Dept: HEALTH INFORMATION MANAGEMENT | Facility: CLINIC | Age: 49
End: 2019-02-07

## 2019-02-13 DIAGNOSIS — E55.9 VITAMIN D DEFICIENCY: Primary | ICD-10-CM

## 2019-03-22 DIAGNOSIS — E55.9 VITAMIN D DEFICIENCY: ICD-10-CM

## 2019-03-22 DIAGNOSIS — Z79.899 HIGH RISK MEDICATION USE: ICD-10-CM

## 2019-03-22 LAB
ALBUMIN SERPL-MCNC: 3.4 G/DL (ref 3.4–5)
ALP SERPL-CCNC: 71 U/L (ref 40–150)
ALT SERPL W P-5'-P-CCNC: 25 U/L (ref 0–50)
AST SERPL W P-5'-P-CCNC: 22 U/L (ref 0–45)
BASOPHILS # BLD AUTO: 0 10E9/L (ref 0–0.2)
BASOPHILS NFR BLD AUTO: 0.2 %
BILIRUB DIRECT SERPL-MCNC: 0.2 MG/DL (ref 0–0.2)
BILIRUB SERPL-MCNC: 0.6 MG/DL (ref 0.2–1.3)
DIFFERENTIAL METHOD BLD: ABNORMAL
EOSINOPHIL # BLD AUTO: 0.1 10E9/L (ref 0–0.7)
EOSINOPHIL NFR BLD AUTO: 1.1 %
ERYTHROCYTE [DISTWIDTH] IN BLOOD BY AUTOMATED COUNT: 13.9 % (ref 10–15)
HCT VFR BLD AUTO: 37.6 % (ref 35–47)
HGB BLD-MCNC: 13 G/DL (ref 11.7–15.7)
LYMPHOCYTES # BLD AUTO: 3.5 10E9/L (ref 0.8–5.3)
LYMPHOCYTES NFR BLD AUTO: 41.6 %
MCH RBC QN AUTO: 36.3 PG (ref 26.5–33)
MCHC RBC AUTO-ENTMCNC: 34.6 G/DL (ref 31.5–36.5)
MCV RBC AUTO: 105 FL (ref 78–100)
MONOCYTES # BLD AUTO: 0.6 10E9/L (ref 0–1.3)
MONOCYTES NFR BLD AUTO: 7.5 %
NEUTROPHILS # BLD AUTO: 4.2 10E9/L (ref 1.6–8.3)
NEUTROPHILS NFR BLD AUTO: 49.6 %
PLATELET # BLD AUTO: 261 10E9/L (ref 150–450)
PROT SERPL-MCNC: 7.2 G/DL (ref 6.8–8.8)
RBC # BLD AUTO: 3.58 10E12/L (ref 3.8–5.2)
WBC # BLD AUTO: 8.4 10E9/L (ref 4–11)

## 2019-03-22 PROCEDURE — 36415 COLL VENOUS BLD VENIPUNCTURE: CPT | Performed by: PHYSICIAN ASSISTANT

## 2019-03-22 PROCEDURE — 82306 VITAMIN D 25 HYDROXY: CPT | Performed by: PHYSICIAN ASSISTANT

## 2019-03-22 PROCEDURE — 80076 HEPATIC FUNCTION PANEL: CPT | Performed by: PHYSICIAN ASSISTANT

## 2019-03-22 PROCEDURE — 85025 COMPLETE CBC W/AUTO DIFF WBC: CPT | Performed by: PHYSICIAN ASSISTANT

## 2019-03-25 LAB
DEPRECATED CALCIDIOL+CALCIFEROL SERPL-MC: 32 UG/L (ref 20–75)
VITAMIN D2 SERPL-MCNC: 23 UG/L
VITAMIN D3 SERPL-MCNC: 9 UG/L

## 2019-04-02 DIAGNOSIS — Z79.899 HIGH RISK MEDICATION USE: Primary | ICD-10-CM

## 2019-04-02 DIAGNOSIS — K50.818 CROHN'S DISEASE OF BOTH SMALL AND LARGE INTESTINE WITH OTHER COMPLICATION (H): ICD-10-CM

## 2019-07-02 DIAGNOSIS — Z79.899 HIGH RISK MEDICATION USE: Primary | ICD-10-CM

## 2019-07-25 ENCOUNTER — TELEPHONE (OUTPATIENT)
Dept: FAMILY MEDICINE | Facility: OTHER | Age: 49
End: 2019-07-25

## 2019-07-25 NOTE — TELEPHONE ENCOUNTER
7/25/2019    Attempt 1    Contacted patient in regards to scheduling VIP mammogram screening at [facility] on 08/15/19  Message on voicemail     Patient is also due for - Preventive Health Screening Cervical/PAP and PHYSICAL    Comments:       Outreach   Sabrina FUENTES

## 2019-08-06 NOTE — TELEPHONE ENCOUNTER
8/6/2019    Attempt 2    Contacted patient in regards to scheduling VIP mammogram screening at  on 8/19/19  Message on voicemail     Patient is also due for - Preventive Health Screening Cervical/PAP, LDL and PHYSICAL    Comments:       Outreach   ST

## 2019-08-28 NOTE — TELEPHONE ENCOUNTER
8/27/2019    Attempt 3    Contacted patient in regards to scheduling VIP mammogram screening at [facility] on 9/24  Message on voicemail     Patient is also due for - Preventive Health Screening Cervical/PAP, LDL and physical    Comments:       Outreach   ST

## 2019-09-24 ENCOUNTER — TRANSFERRED RECORDS (OUTPATIENT)
Dept: HEALTH INFORMATION MANAGEMENT | Facility: CLINIC | Age: 49
End: 2019-09-24

## 2019-09-25 DIAGNOSIS — K50.818 CROHN'S DISEASE OF BOTH SMALL AND LARGE INTESTINE WITH OTHER COMPLICATION (H): Primary | ICD-10-CM

## 2019-09-25 DIAGNOSIS — K50.80 CROHN'S DISEASE OF SMALL AND LARGE INTESTINES (H): ICD-10-CM

## 2019-09-25 DIAGNOSIS — E55.9 VITAMIN D DEFICIENCY: Primary | ICD-10-CM

## 2020-07-09 ENCOUNTER — TRANSFERRED RECORDS (OUTPATIENT)
Dept: HEALTH INFORMATION MANAGEMENT | Facility: CLINIC | Age: 50
End: 2020-07-09

## 2020-08-24 ENCOUNTER — TRANSFERRED RECORDS (OUTPATIENT)
Dept: HEALTH INFORMATION MANAGEMENT | Facility: CLINIC | Age: 50
End: 2020-08-24

## 2020-08-28 ENCOUNTER — TRANSFERRED RECORDS (OUTPATIENT)
Dept: HEALTH INFORMATION MANAGEMENT | Facility: CLINIC | Age: 50
End: 2020-08-28

## 2020-09-18 DIAGNOSIS — E55.9 VITAMIN D DEFICIENCY: ICD-10-CM

## 2020-09-18 DIAGNOSIS — K50.80 CROHN'S DISEASE OF SMALL AND LARGE INTESTINES (H): ICD-10-CM

## 2020-09-18 LAB
ALBUMIN SERPL-MCNC: 3.4 G/DL (ref 3.4–5)
ALP SERPL-CCNC: 71 U/L (ref 40–150)
ALT SERPL W P-5'-P-CCNC: 20 U/L (ref 0–50)
AST SERPL W P-5'-P-CCNC: 18 U/L (ref 0–45)
BASOPHILS # BLD AUTO: 0.1 10E9/L (ref 0–0.2)
BASOPHILS NFR BLD AUTO: 0.8 %
BILIRUB DIRECT SERPL-MCNC: 0.2 MG/DL (ref 0–0.2)
BILIRUB SERPL-MCNC: 0.8 MG/DL (ref 0.2–1.3)
CREAT SERPL-MCNC: 0.78 MG/DL (ref 0.52–1.04)
DIFFERENTIAL METHOD BLD: ABNORMAL
EOSINOPHIL NFR BLD AUTO: 2 %
ERYTHROCYTE [DISTWIDTH] IN BLOOD BY AUTOMATED COUNT: 13.4 % (ref 10–15)
GFR SERPL CREATININE-BSD FRML MDRD: 89 ML/MIN/{1.73_M2}
HCT VFR BLD AUTO: 41.9 % (ref 35–47)
HGB BLD-MCNC: 13.4 G/DL (ref 11.7–15.7)
IMM GRANULOCYTES # BLD: 0 10E9/L (ref 0–0.4)
IMM GRANULOCYTES NFR BLD: 0.1 %
LYMPHOCYTES # BLD AUTO: 3.3 10E9/L (ref 0.8–5.3)
LYMPHOCYTES NFR BLD AUTO: 38.2 %
MCH RBC QN AUTO: 35.2 PG (ref 26.5–33)
MCHC RBC AUTO-ENTMCNC: 32 G/DL (ref 31.5–36.5)
MCV RBC AUTO: 110 FL (ref 78–100)
MONOCYTES # BLD AUTO: 0.4 10E9/L (ref 0–1.3)
MONOCYTES NFR BLD AUTO: 4.7 %
NEUTROPHILS # BLD AUTO: 4.7 10E9/L (ref 1.6–8.3)
NEUTROPHILS NFR BLD AUTO: 54.2 %
NRBC # BLD AUTO: 0 10*3/UL
NRBC BLD AUTO-RTO: 0 /100
PLATELET # BLD AUTO: 252 10E9/L (ref 150–450)
PROT SERPL-MCNC: 7.6 G/DL (ref 6.8–8.8)
RBC # BLD AUTO: 3.81 10E12/L (ref 3.8–5.2)
WBC # BLD AUTO: 8.7 10E9/L (ref 4–11)

## 2020-09-18 PROCEDURE — 82565 ASSAY OF CREATININE: CPT | Performed by: PHYSICIAN ASSISTANT

## 2020-09-18 PROCEDURE — 80076 HEPATIC FUNCTION PANEL: CPT | Performed by: PHYSICIAN ASSISTANT

## 2020-09-18 PROCEDURE — 85025 COMPLETE CBC W/AUTO DIFF WBC: CPT | Performed by: PHYSICIAN ASSISTANT

## 2020-09-18 PROCEDURE — 82306 VITAMIN D 25 HYDROXY: CPT | Performed by: PHYSICIAN ASSISTANT

## 2020-09-18 PROCEDURE — 36415 COLL VENOUS BLD VENIPUNCTURE: CPT | Performed by: PHYSICIAN ASSISTANT

## 2020-09-20 DIAGNOSIS — K50.818 CROHN'S DISEASE OF BOTH SMALL AND LARGE INTESTINE WITH OTHER COMPLICATION (H): Primary | ICD-10-CM

## 2020-09-22 LAB
DEPRECATED CALCIDIOL+CALCIFEROL SERPL-MC: 35 UG/L (ref 20–75)
VITAMIN D2 SERPL-MCNC: 6 UG/L
VITAMIN D3 SERPL-MCNC: 29 UG/L

## 2020-09-24 ENCOUNTER — TRANSFERRED RECORDS (OUTPATIENT)
Dept: HEALTH INFORMATION MANAGEMENT | Facility: CLINIC | Age: 50
End: 2020-09-24

## 2020-11-20 ENCOUNTER — OFFICE VISIT (OUTPATIENT)
Dept: FAMILY MEDICINE | Facility: CLINIC | Age: 50
End: 2020-11-20
Payer: COMMERCIAL

## 2020-11-20 VITALS
SYSTOLIC BLOOD PRESSURE: 120 MMHG | DIASTOLIC BLOOD PRESSURE: 80 MMHG | WEIGHT: 216 LBS | HEIGHT: 67 IN | RESPIRATION RATE: 20 BRPM | HEART RATE: 100 BPM | OXYGEN SATURATION: 99 % | BODY MASS INDEX: 33.9 KG/M2 | TEMPERATURE: 97.3 F

## 2020-11-20 DIAGNOSIS — M70.61 TROCHANTERIC BURSITIS OF RIGHT HIP: Primary | ICD-10-CM

## 2020-11-20 DIAGNOSIS — M72.2 PLANTAR FASCIITIS: ICD-10-CM

## 2020-11-20 PROCEDURE — 99214 OFFICE O/P EST MOD 30 MIN: CPT | Performed by: FAMILY MEDICINE

## 2020-11-20 RX ORDER — CHOLECALCIFEROL TAB 125 MCG (5000 UNIT) 125 MCG (5000 UT)
5000 TAB
COMMUNITY
Start: 2020-06-23

## 2020-11-20 RX ORDER — ADALIMUMAB 40MG/0.4ML
80 KIT SUBCUTANEOUS
COMMUNITY
Start: 2020-09-14

## 2020-11-20 RX ORDER — AZATHIOPRINE 50 MG/1
250 TABLET ORAL
COMMUNITY
Start: 2020-09-10

## 2020-11-20 RX ORDER — CETIRIZINE HYDROCHLORIDE 10 MG/1
10 TABLET ORAL DAILY
COMMUNITY

## 2020-11-20 ASSESSMENT — ASTHMA QUESTIONNAIRES
QUESTION_3 LAST FOUR WEEKS HOW OFTEN DID YOUR ASTHMA SYMPTOMS (WHEEZING, COUGHING, SHORTNESS OF BREATH, CHEST TIGHTNESS OR PAIN) WAKE YOU UP AT NIGHT OR EARLIER THAN USUAL IN THE MORNING: NOT AT ALL
QUESTION_1 LAST FOUR WEEKS HOW MUCH OF THE TIME DID YOUR ASTHMA KEEP YOU FROM GETTING AS MUCH DONE AT WORK, SCHOOL OR AT HOME: NONE OF THE TIME
QUESTION_4 LAST FOUR WEEKS HOW OFTEN HAVE YOU USED YOUR RESCUE INHALER OR NEBULIZER MEDICATION (SUCH AS ALBUTEROL): NOT AT ALL
QUESTION_5 LAST FOUR WEEKS HOW WOULD YOU RATE YOUR ASTHMA CONTROL: COMPLETELY CONTROLLED
ACT_TOTALSCORE: 25
QUESTION_2 LAST FOUR WEEKS HOW OFTEN HAVE YOU HAD SHORTNESS OF BREATH: NOT AT ALL

## 2020-11-20 ASSESSMENT — PAIN SCALES - GENERAL: PAINLEVEL: MILD PAIN (3)

## 2020-11-20 ASSESSMENT — MIFFLIN-ST. JEOR: SCORE: 1637.4

## 2020-11-20 NOTE — PROGRESS NOTES
Subjective     Mackenize Worrell is a 50 year old female who presents to clinic today for the following health issues:    HPI         Musculoskeletal problem/pain  Onset/Duration: 07/09/2020  Description  Location: foot and hip - right  Joint Swelling: no  Redness: no  Pain: YES  Warmth: no  Intensity:  severe  Progression of Symptoms:  Improving after changing shoes with foot. Hip staying the same  Accompanying signs and symptoms:   Fevers: no  Numbness/tingling/weakness: no  History  Trauma to the area: no  Recent illness:  no  Previous similar problem: no  Previous evaluation:  no  Precipitating or alleviating factors:  Aggravating factors include: sitting and standing  Therapies tried and outcome: heat made worse, ice and inserts for shoes. Roll tenuis ball under foot. IBU.     Patient Active Problem List   Diagnosis     Regional enteritis of small intestine with large intestine (H)     Mild intermittent asthma     Tobacco use disorder     Contact dermatitis and other eczema, due to unspecified cause     Inflammatory arthritis     CARDIOVASCULAR SCREENING; LDL GOAL LESS THAN 160     Effusion of ankle     Stress fracture of ankle     Crohn's disease (H)     S/P colectomy     Adalimumab (Humira) long-term use     Morbid obesity due to excess calories (H)     Foreign body reaction right hand.     Rhinoconjunctivitis     Hives     Dermatographism     Current Outpatient Medications   Medication Sig Dispense Refill     adalimumab (HUMIRA *CF* PEN) 40 MG/0.4ML pen kit Inject 80 mg Subcutaneous       azaTHIOprine (IMURAN) 50 MG tablet Take 250 mg by mouth       calcium carbonate (OS- MG [place]. [place]) 500 MG tablet        cetirizine (ZYRTEC) 10 MG tablet Take 10 mg by mouth daily       colestipol (COLESTID) 1 G tablet Take 1 g by mouth 2 times daily  3     cyanocobalamin (VITAMIN B12) 1000 MCG/ML injection INJECT 1 ML (1,000 MCG TOTAL) INTRAMUSCULARLY EVERY 2 WEEKS  3     IBUPROFEN PO Take 400 mg by mouth        "melatonin 3 MG tablet Take 3 mg by mouth nightly as needed for sleep       multivitamin, therapeutic with minerals (MULTI-VITAMIN) TABS tablet Take 1 tablet by mouth daily       Syringe/Needle, Disp, 25G X 5/8\" 3 ML MISC For use with B12 inj       adalimumab (HUMIRA) 40 MG/0.8ML pen kit        albuterol (2.5 MG/3ML) 0.083% nebulizer solution Take 1 vial (2.5 mg) by nebulization every 4 hours as needed for shortness of breath / dyspnea or wheezing (Patient not taking: Reported on 11/20/2020) 60 vial 0     albuterol (PROAIR HFA/PROVENTIL HFA/VENTOLIN HFA) 108 (90 Base) MCG/ACT Inhaler Inhale 2 puffs into the lungs every 4 hours as needed for shortness of breath / dyspnea or wheezing (Patient not taking: Reported on 11/20/2020) 1 Inhaler 0     albuterol (PROAIR HFA/PROVENTIL HFA/VENTOLIN HFA) 108 (90 BASE) MCG/ACT Inhaler Inhale 2 puffs into the lungs every 4 hours as needed (Patient not taking: Reported on 11/20/2020) 2 Inhaler 3     NATURAL VITAMIN D-3 125 MCG (5000 UT) TABS Take 5,000 Units by mouth           Review of Systems   Constitutional, HEENT, cardiovascular, pulmonary, gi and gu systems are negative, except as otherwise noted.      Objective    /80 (BP Location: Right arm, Patient Position: Chair, Cuff Size: Adult Regular)   Pulse 100   Temp 97.3  F (36.3  C) (Temporal)   Resp 20   Ht 1.702 m (5' 7\")   Wt 98 kg (216 lb)   LMP 08/07/2020   SpO2 99%   BMI 33.83 kg/m    Body mass index is 33.83 kg/m .  Physical Exam   GENERAL: healthy, alert, no distress and over weight  NECK: no adenopathy, no asymmetry, masses, or scars and thyroid normal to palpation  RESP: lungs clear to auscultation - no rales, rhonchi or wheezes  CV: regular rate and rhythm, normal S1 S2, no S3 or S4, no murmur, click or rub, no peripheral edema and peripheral pulses strong  ABDOMEN: soft, nontender, no hepatosplenomegaly, no masses and bowel sounds normal  MS: FEET: tenderness to the inferomedial aspect of the effected " "heel(s)at the insertion of the plantar fascia.  EXTREMITIES: Full ROM in hips.  No deformities.  Tender around greater trochanter on right and at muscular insertions.                Assessment & Plan     Trochanteric bursitis of right hip  Acute trochanteric bursitis, possibly due to plantar fasciitis which is improving due to change back to old shoes. She continues to have lateral hip point pain. No SI tenderness or loss of hip mobility. Ice, stretch and refer to [facility] for injection.  - Orthopedic & Spine  Referral; Future    Plantar fasciitis  Acute on chronic,  improving due to change back to old shoes.Plantar Fasciitis:    * Superfeet insoles not bad ([employer] or [employer] [place], [[address] [place]] or [employer] [place] [[address]    * Good walking shoes or running shoes that would be appropriate for your activities.  Leave the store if they don't seem to know what's up.    * Try to minimize the pounding on your feet.  Less high impact and more low.  Minimize quick/accelerating movements (jumping or running fast).    * Stretching twice daily.  30-45 seconds with each stretch.     1-Calve (knee straight)  2-Lower calve (knee bent)  3-Plantar fascia    * Ice if possible after activity.    * Warm-ups in bed before getting up.    * Posterior night splint if not improving in the next 2-3 weeks.         Tobacco Cessation:   reports that she has been smoking cigarettes. She has a 18.75 pack-year smoking history. She has never used smokeless tobacco.        BMI:   Estimated body mass index is 33.83 kg/m  as calculated from the following:    Height as of this encounter: 1.702 m (5' 7\").    Weight as of this encounter: 98 kg (216 lb).   Weight management plan: Patient was referred to their PCP to discuss a diet and exercise plan.         Patient Instructions       Patient Education     Plantar Fasciitis  Plantar fasciitis is a painful swelling of the plantar fascia. The " plantar fascia is a thick, fibrous layer of tissue that covers the bones on the bottom of your foot. It supports the foot bones in an arched position.  Plantar fasciitis can happen gradually or suddenly. It usually affects one foot at a time. Heel pain can be sharp, like a knife sticking into the bottom of your foot. You may feel pain after exercising, long-distance jogging, stair climbing, long periods of standing, or after standing up.  Risk factors include: non-active lifestyle, arthritis, diabetes, obesity or recent weight gain, flat foot, high arch. Wearing high heels, loose shoes, or shoes with poor arch support for long periods of time adds to the risk. This problem is commonly found in runners and dancers. It also found in people who stand on hard surfaces for long periods of time.  Foot pain from this condition is usually worse in the morning. But it often improves with walking. By the end of the day there may be a dull aching. Treatment requires short-term rest and controlling swelling. It may take up to 9 months before all symptoms go away. Rarely, a steroid injection into the foot, or surgery, may be needed.  Home care    If you are overweight, lose weight to help healing.    Choose supportive shoes with good arch support and shock absorbency. Replace athletic shoes when they become worn out. Don t walk or run barefoot.    Premade or custom-fitted shoe inserts may be helpful. Inserts made of silicone seem to be the most effective. Custom-made inserts can be provided by foot specialist, physical therapist, or orthopedist.    Premade or custom-made night splints keep the heel stretched out while you sleep. They may prevent morning pain.    Limit activities that stress the feet: jogging, prolonged standing or walking, contact sports, etc.    First thing in the morning and before sports, stretch the bottom of your feet. Gently flex your ankle so the toes move toward your knee.    Icing may help control heel  pain. Apply an ice pack to the heel for 10 to 20 minutes as a preventive. Or ice your heel after a severe flare-up of symptoms. You may repeat this every 1 to 2 hours as needed.    You may use over-the-counter pain medicine to control pain, unless another medicine was prescribed. Anti-inflammatory pain medicines, such as ibuprofen or naproxen, may work better than acetaminophen. If you have chronic liver or kidney disease or ever had a stomach ulcer or gastrointestinal bleeding, talk with your healthcare provider before using these medicines.  Follow-up care  Follow up with your healthcare provider, or as advised.  Call for an appointment if pain worsens or there is no relief after a few weeks of home treatment. Shoe inserts, a night splint, or a special boot may be required.  If X-rays were taken, you will be told of any new findings that may affect your care.  When to seek medical advice  Call your healthcare provider right away if any of these occur:    Foot swelling    Redness or warmth with increasing pain  [employer] last reviewed this educational content on 5/1/2018 2000-2020 [employer]. [address] [place] [address]. All rights reserved. This information is not intended as a substitute for professional medical care. Always follow your healthcare professional's instructions.           Patient Education     Understanding Plantar Fasciitis    Plantar fasciitis is a condition that causes foot and heel pain. The plantar fascia is a tough band of tissue that runs across the bottom of the foot from the heel to the toes. This tissue pulls on the heel bone. It supports the arch of the foot as it pushes off the ground. If the tissue becomes irritated or red and swollen (inflamed), it is called plantar fasciitis.    How to say it  PLAN-tar [id number]   What causes plantar fasciitis?  Plantar fasciitis most often occurs from overusing the plantar fascia. The tissue may become damaged  from activities that put repeated stress on the heel and foot. Or it may wear down over time with age and ankle stiffness. You are more likely to have plantar fasciitis if you:     Do activities that require a lot of running, jumping, or dancing    Have new or increase activity    Have a job that requires being on your feet for long periods    Are overweight or obese    Have certain foot problems, such as a tight Achilles tendon, flat feet, or high arches    Often wear poorly fitting shoes  Symptoms of plantar fasciitis  The condition most often causes pain in the heel and the bottom of the foot. The pain may occur when you take your first steps in the morning. It may get better as you walk throughout the day. But as you continue to put weight on the foot, the pain often returns. Pain may also occur after standing or sitting for long periods.   Treating plantar fasciitis  Treatments for plantar fasciitis include:    Resting the foot. This involves limiting movements that make your foot hurt. You may also need to avoid certain sports and types of work for a time.    Using cold packs. Put an ice pack (wrapped in a thin towel) on the heel and foot to help reduce pain and swelling.    Taking medicines. Prescription and over-the-counter medicines can help relieve pain and swelling. NSAIDs (nonsteroidal anti-inflammatory drugs) are the most common medicines used. They may be given as pills. Or they may be put on the skin as a gel, cream, or patch.    Using heel cups or foot inserts (orthotics). These are placed in the shoes to help support the heel or arch and cushion the heel. You may also be advised to buy proper-fitting shoes with good arch support and cushioned soles.    Taping the foot. This supports the arch and limits the movement of the plantar fascia to help ease symptoms.    Wearing a night splint. This stretches the plantar fascia and leg muscles while you sleep. This may help ease pain.    Doing exercises and  physical therapy. These stretch and strengthen the plantar fascia and the muscles in the leg that support the heel and foot. Stretching your calf and plantar fascia is the most effective way to relieve pain.    Getting shots of medicine into the foot. These may help ease symptoms for a time. The shots often contain corticosteroids. These are strong anti-inflammatory medicines.    Having surgery. This may be needed if other treatments fail to relieve symptoms. During surgery, the surgeon may partially cut the plantar fascia to release tension.  Possible complications of plantar fasciitis  Without proper care and treatment, healing may take longer than normal. Also, symptoms may continue or get worse. Over time, the plantar fascia may be damaged. This can make it hard to walk or even stand without pain.   When to call your healthcare provider  Call your healthcare provider right away if you have any of these:    Fever of 100.4 F (38 C) or higher, or as directed by your provider    Chills    Symptoms that don t get better with treatment, or get worse    New symptoms, such as numbness, tingling, or weakness in the foot  [employer] last reviewed this educational content on 6/1/2019 2000-2020 [employer]. [address] [place] [address]. All rights reserved. This information is not intended as a substitute for professional medical care. Always follow your healthcare professional's instructions.           Patient Education     Understanding Trochanteric Bursitis    A bursa is a thin, slippery, sac-like film. It contains a small amount of fluid. This structure is found between bones and soft tissues in and around joints. A bursa cushions and protects a joint. It keeps parts of a joint from rubbing against each other. If a bursa becomes inflamed and irritated, it's known as bursitis.   The trochanteric bursa is found on the hip joint. It lies on top of the bump at the top of the thighbone called the  greater trochanter. Inflammation of this bursa is called trochanteric bursitis.   How to say it   [id number]  Causes of trochanteric bursitis  Causes may include:    Overuse of the hip during running or other sports, dance, or work    Falling on or irritation to the side of the hip  This condition may occur along with other problems, such as osteoarthritis of the hip or knee, or low back problems. In rare cases, it may occur after hip surgery.   Symptoms of trochanteric bursitis    Pain or aching on the side of the hip. It's often felt as a sharp, intense pain. The pain may travel down the leg.    Swelling, tenderness, or warmth on the side of the hip at the bony bump at the top of the thigh    Treatment for trochanteric bursitis  These may include:    Resting the hip. This allows the bursa to heal.    Prescription or over-the-counter pain medicines. These help reduce inflammation, swelling, and pain. NSAIDs (nonsteroidal anti-inflammatory drugs) are the most common medicines used. Medicines may be prescribed or bought over the counter. They may be given as pills. Or they may be put on the skin as a gel, cream, or patch.    Cold packs and heat packs. These help reduce pain and swelling.    Stretching and strengthening exercises. These improve flexibility and strength around the hip.    Physical therapy. This includes exercises or other treatments.    Injections of medicine into the bursa.  This may help reduce inflammation and relieve symptoms. The medicine is usually a corticosteroid. This is a strong anti-inflammatory medicine.  Possible complications  If you don t give your hip time to heal, the problem may not go away, may return, or may get worse. Rest and treat your hip as directed.   When to call your healthcare provider  Call your healthcare provider right away if you have any of these:    Fever of 100.4 F (38 C) or higher, or as directed by your provider    Redness, swelling, or warmth that gets  worse    Symptoms that don t get better with prescribed medicines, or get worse    New symptoms  [employer] last reviewed this educational content on 6/1/2019 2000-2020 [employer]. [address] [place] [id number]. All rights reserved. This information is not intended as a substitute for professional medical care. Always follow your healthcare professional's instructions.           Patient Education     Iliotibial Band Stretch (Flexibility)    1. Stand next to a chair. Hold onto the chair with your right hand for support. Cross your right leg behind your left leg.  2. Lean your right hip toward the right. Feel the stretch at the outside of your hip.  3. Hold for 30 to 60 seconds. Then relax.  4. Repeat 2 times, or as instructed.  5. Switch sides and repeat.  6. Do this 3 times a day, or as instructed.     Tip: Don t bend forward or twist at the waist.   [employer] last reviewed this educational content on 3/29/2016    1055-1567 [employer]. [address] [place] [address]. All rights reserved. This information is not intended as a substitute for professional medical care. Always follow your healthcare professional's instructions.               No follow-ups on file.    Aneudy Chaudhry MD  [facility]

## 2020-11-20 NOTE — PATIENT INSTRUCTIONS
Patient Education     Plantar Fasciitis  Plantar fasciitis is a painful swelling of the plantar fascia. The plantar fascia is a thick, fibrous layer of tissue that covers the bones on the bottom of your foot. It supports the foot bones in an arched position.  Plantar fasciitis can happen gradually or suddenly. It usually affects one foot at a time. Heel pain can be sharp, like a knife sticking into the bottom of your foot. You may feel pain after exercising, long-distance jogging, stair climbing, long periods of standing, or after standing up.  Risk factors include: non-active lifestyle, arthritis, diabetes, obesity or recent weight gain, flat foot, high arch. Wearing high heels, loose shoes, or shoes with poor arch support for long periods of time adds to the risk. This problem is commonly found in runners and dancers. It also found in people who stand on hard surfaces for long periods of time.  Foot pain from this condition is usually worse in the morning. But it often improves with walking. By the end of the day there may be a dull aching. Treatment requires short-term rest and controlling swelling. It may take up to 9 months before all symptoms go away. Rarely, a steroid injection into the foot, or surgery, may be needed.  Home care    If you are overweight, lose weight to help healing.    Choose supportive shoes with good arch support and shock absorbency. Replace athletic shoes when they become worn out. Don t walk or run barefoot.    Premade or custom-fitted shoe inserts may be helpful. Inserts made of silicone seem to be the most effective. Custom-made inserts can be provided by foot specialist, physical therapist, or orthopedist.    Premade or custom-made night splints keep the heel stretched out while you sleep. They may prevent morning pain.    Limit activities that stress the feet: jogging, prolonged standing or walking, contact sports, etc.    First thing in the morning and before sports, stretch the  bottom of your feet. Gently flex your ankle so the toes move toward your knee.    Icing may help control heel pain. Apply an ice pack to the heel for 10 to 20 minutes as a preventive. Or ice your heel after a severe flare-up of symptoms. You may repeat this every 1 to 2 hours as needed.    You may use over-the-counter pain medicine to control pain, unless another medicine was prescribed. Anti-inflammatory pain medicines, such as ibuprofen or naproxen, may work better than acetaminophen. If you have chronic liver or kidney disease or ever had a stomach ulcer or gastrointestinal bleeding, talk with your healthcare provider before using these medicines.  Follow-up care  Follow up with your healthcare provider, or as advised.  Call for an appointment if pain worsens or there is no relief after a few weeks of home treatment. Shoe inserts, a night splint, or a special boot may be required.  If X-rays were taken, you will be told of any new findings that may affect your care.  When to seek medical advice  Call your healthcare provider right away if any of these occur:    Foot swelling    Redness or warmth with increasing pain  Georgina last reviewed this educational content on 5/1/2018 2000-2020 [employer]. [address] [place] [address]. All rights reserved. This information is not intended as a substitute for professional medical care. Always follow your healthcare professional's instructions.           Patient Education     Understanding Plantar Fasciitis    Plantar fasciitis is a condition that causes foot and heel pain. The plantar fascia is a tough band of tissue that runs across the bottom of the foot from the heel to the toes. This tissue pulls on the heel bone. It supports the arch of the foot as it pushes off the ground. If the tissue becomes irritated or red and swollen (inflamed), it is called plantar fasciitis.    How to say it  PLAN-tar [id number]-EYE-[id number]   What causes plantar  fasciitis?  Plantar fasciitis most often occurs from overusing the plantar fascia. The tissue may become damaged from activities that put repeated stress on the heel and foot. Or it may wear down over time with age and ankle stiffness. You are more likely to have plantar fasciitis if you:     Do activities that require a lot of running, jumping, or dancing    Have new or increase activity    Have a job that requires being on your feet for long periods    Are overweight or obese    Have certain foot problems, such as a tight Achilles tendon, flat feet, or high arches    Often wear poorly fitting shoes  Symptoms of plantar fasciitis  The condition most often causes pain in the heel and the bottom of the foot. The pain may occur when you take your first steps in the morning. It may get better as you walk throughout the day. But as you continue to put weight on the foot, the pain often returns. Pain may also occur after standing or sitting for long periods.   Treating plantar fasciitis  Treatments for plantar fasciitis include:    Resting the foot. This involves limiting movements that make your foot hurt. You may also need to avoid certain sports and types of work for a time.    Using cold packs. Put an ice pack (wrapped in a thin towel) on the heel and foot to help reduce pain and swelling.    Taking medicines. Prescription and over-the-counter medicines can help relieve pain and swelling. NSAIDs (nonsteroidal anti-inflammatory drugs) are the most common medicines used. They may be given as pills. Or they may be put on the skin as a gel, cream, or patch.    Using heel cups or foot inserts (orthotics). These are placed in the shoes to help support the heel or arch and cushion the heel. You may also be advised to buy proper-fitting shoes with good arch support and cushioned soles.    Taping the foot. This supports the arch and limits the movement of the plantar fascia to help ease symptoms.    Wearing a night splint.  This stretches the plantar fascia and leg muscles while you sleep. This may help ease pain.    Doing exercises and physical therapy. These stretch and strengthen the plantar fascia and the muscles in the leg that support the heel and foot. Stretching your calf and plantar fascia is the most effective way to relieve pain.    Getting shots of medicine into the foot. These may help ease symptoms for a time. The shots often contain corticosteroids. These are strong anti-inflammatory medicines.    Having surgery. This may be needed if other treatments fail to relieve symptoms. During surgery, the surgeon may partially cut the plantar fascia to release tension.  Possible complications of plantar fasciitis  Without proper care and treatment, healing may take longer than normal. Also, symptoms may continue or get worse. Over time, the plantar fascia may be damaged. This can make it hard to walk or even stand without pain.   When to call your healthcare provider  Call your healthcare provider right away if you have any of these:    Fever of 100.4 F (38 C) or higher, or as directed by your provider    Chills    Symptoms that don t get better with treatment, or get worse    New symptoms, such as numbness, tingling, or weakness in the foot  Georgina last reviewed this educational content on 6/1/2019 2000-2020 [employer]. [address] [place] [address]. All rights reserved. This information is not intended as a substitute for professional medical care. Always follow your healthcare professional's instructions.           Patient Education     Understanding Trochanteric Bursitis    A bursa is a thin, slippery, sac-like film. It contains a small amount of fluid. This structure is found between bones and soft tissues in and around joints. A bursa cushions and protects a joint. It keeps parts of a joint from rubbing against each other. If a bursa becomes inflamed and irritated, it's known as bursitis.    The trochanteric bursa is found on the hip joint. It lies on top of the bump at the top of the thighbone called the greater trochanter. Inflammation of this bursa is called trochanteric bursitis.   How to say it   [id number]  Causes of trochanteric bursitis  Causes may include:    Overuse of the hip during running or other sports, dance, or work    Falling on or irritation to the side of the hip  This condition may occur along with other problems, such as osteoarthritis of the hip or knee, or low back problems. In rare cases, it may occur after hip surgery.   Symptoms of trochanteric bursitis    Pain or aching on the side of the hip. It's often felt as a sharp, intense pain. The pain may travel down the leg.    Swelling, tenderness, or warmth on the side of the hip at the bony bump at the top of the thigh    Treatment for trochanteric bursitis  These may include:    Resting the hip. This allows the bursa to heal.    Prescription or over-the-counter pain medicines. These help reduce inflammation, swelling, and pain. NSAIDs (nonsteroidal anti-inflammatory drugs) are the most common medicines used. Medicines may be prescribed or bought over the counter. They may be given as pills. Or they may be put on the skin as a gel, cream, or patch.    Cold packs and heat packs. These help reduce pain and swelling.    Stretching and strengthening exercises. These improve flexibility and strength around the hip.    Physical therapy. This includes exercises or other treatments.    Injections of medicine into the bursa.  This may help reduce inflammation and relieve symptoms. The medicine is usually a corticosteroid. This is a strong anti-inflammatory medicine.  Possible complications  If you don t give your hip time to heal, the problem may not go away, may return, or may get worse. Rest and treat your hip as directed.   When to call your healthcare provider  Call your healthcare provider right away if you have any of  these:    Fever of 100.4 F (38 C) or higher, or as directed by your provider    Redness, swelling, or warmth that gets worse    Symptoms that don t get better with prescribed medicines, or get worse    New symptoms  Georgina last reviewed this educational content on 6/1/2019 2000-2020 [employer]. [address] [place] [address]. All rights reserved. This information is not intended as a substitute for professional medical care. Always follow your healthcare professional's instructions.           Patient Education     Iliotibial Band Stretch (Flexibility)    1. Stand next to a chair. Hold onto the chair with your right hand for support. Cross your right leg behind your left leg.  2. Lean your right hip toward the right. Feel the stretch at the outside of your hip.  3. Hold for 30 to 60 seconds. Then relax.  4. Repeat 2 times, or as instructed.  5. Switch sides and repeat.  6. Do this 3 times a day, or as instructed.     Tip: Don t bend forward or twist at the waist.   Georgina last reviewed this educational content on 3/29/2016    6161-4869 [employer]. [address] [place] [address]. All rights reserved. This information is not intended as a substitute for professional medical care. Always follow your healthcare professional's instructions.

## 2020-11-21 ASSESSMENT — ASTHMA QUESTIONNAIRES: ACT_TOTALSCORE: 25

## 2020-11-24 NOTE — PROGRESS NOTES
Sports Medicine Clinic Visit    PCP: Sean Alvarenga    CC: Patient presents with:  Right Hip - Pain      HPI:  Mackenzie Worrell is a 50 year old female who is seen in consultation at the request of Dr. Chaudhry for possible injection.  She notes right hip pain that began in May.  She sees rheumatology for Crohn's disease and arthritis and had mentioned it to them in June.  She notes some days are better than others.  She notes lateral hip pain.  She rates the pain at a 7/10 at its worst and a 10/10 currently.  Symptoms are relieved with ice temporarily. Symptoms are worsened by stairs and lying on the right side.  She endorses pain.   She denies popping, grinding, numbness, tingling and weakness.  Other treatment has included Tylenol and stretching.       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     Review of Systems:  Musculoskeletal: as above  Remainder of review of systems is negative including constitutional, eyes, ENT, CV, pulmonary, GI, , endocrine, skin, hematologic, and neurologic except as noted in HPI or medical history.    History reviewed. No pertinent past surgical/medical/family/social history other than as mentioned in HPI.    Patient Active Problem List   Diagnosis     Regional enteritis of small intestine with large intestine (H)     Mild intermittent asthma     Tobacco use disorder     Contact dermatitis and other eczema, due to unspecified cause     Inflammatory arthritis     CARDIOVASCULAR SCREENING; LDL GOAL LESS THAN 160     Effusion of ankle     Stress fracture of ankle     Crohn's disease (H)     S/P colectomy     Adalimumab (Humira) long-term use     Morbid obesity due to excess calories (H)     Foreign body reaction right hand.     Rhinoconjunctivitis     Hives     Dermatographism     Past Medical History:   Diagnosis Date     Contact dermatitis and other eczema, due to unspecified cause     dishydrotic eczema/hands     Mild intermittent asthma      Osteoarthrosis, unspecified  whether generalized or localized, unspecified site     arthritis     Regional enteritis of small intestine with large intestine (H)     Crohn's disease     Synovitis and tenosynovitis, unspecified     wrist tendonitis     Tobacco use disorder      Past Surgical History:   Procedure Laterality Date     C RESECT SMALL INTEST,SINGL RESEC/ANAS      Resection, Partial Small Bowel     COLONOSCOPY N/A 4/9/2018    Procedure: COMBINED COLONOSCOPY, SINGLE OR MULTIPLE BIOPSY/POLYPECTOMY BY BIOPSY;  Colonoscopy with biopsy by biopsy forceps, with polypectomy by snare;  Surgeon: Marc Robbins MD;  Location: [facility]     EXCISE MASS EAR EXTERNAL Right 5/23/2016    Procedure: EXCISE MASS EAR EXTERNAL;  Surgeon: Asif Akhtar MD;  Location: [facility]     HC COLONOSCOPY W BIOPSY  05/18/10     HC LAPAROSCOPY, SURGICAL; CHOLECYSTECTOMY      Cholecystectomy, Laparoscopic     HC REMOVAL OF TONSILS,<14 Y/O      Tonsils <13y.o.     HERNIA REPAIR, INCISIONAL  08/13/08    Incarcerated ventral incisional hernia.     LAPAROSCOPIC RESECTION SMALL BOWEL N/A 3/9/2016    Procedure: LAPAROSCOPIC RESECTION SMALL BOWEL;  Surgeon: Mark Francisco MD;  Location: [facility]     REMOVE FOREIGN BODY HAND Right 5/31/2017    Procedure: REMOVE FOREIGN BODY HAND;  exploration and excision/removal foreign body palmar days right little finger;  Surgeon: Benjamin Vital MD;  Location: [facility]     ZZC NONSPECIFIC PROCEDURE      laparoscopy for infertility workup/ adhesions     Family History   Problem Relation Age of Onset     Diabetes Father         Type II     EYE* Father         Cataract Surgery, Glaucoma     Diabetes Paternal Grandmother         Type II     Anesthesia Reaction No family hx of      Social History     Socioeconomic History     Marital status:      Spouse name: Jamie     Number of children: 2     Years of education: 12     Highest education level: Not on file   Occupational History     Occupation: home      Employer:  [employer]   Social Needs     Financial resource strain: Not on file     Food insecurity     Worry: Not on file     Inability: Not on file     Transportation needs     Medical: Not on file     Non-medical: Not on file   Tobacco Use     Smoking status: Current Every Day Smoker     Packs/day: 0.75     Years: 25.00     Pack years: 18.75     Types: Cigarettes     Smokeless tobacco: Never Used     Tobacco comment: 1/2 pack daily, started age 20   Substance and Sexual Activity     Alcohol use: Yes     Alcohol/week: 0.0 standard drinks     Comment: rare occasion     Drug use: No     Sexual activity: Yes     Partners: Male     Birth control/protection: Surgical   Lifestyle     Physical activity     Days per week: Not on file     Minutes per session: Not on file     Stress: Not on file   Relationships     Social connections     Talks on phone: Not on file     Gets together: Not on file     Attends Christian service: Not on file     Active member of club or organization: Not on file     Attends meetings of clubs or organizations: Not on file     Relationship status: Not on file     Intimate partner violence     Fear of current or ex partner: Not on file     Emotionally abused: Not on file     Physically abused: Not on file     Forced sexual activity: Not on file   Other Topics Concern      Service No     Blood Transfusions No     Caffeine Concern Yes     Comment: Diet pop 3 cans a day     Occupational Exposure No     Hobby Hazards No     Sleep Concern No     Stress Concern No     Weight Concern No     Special Diet Yes     Comment: Diet due to [place]     Back Care No     Exercise Yes     Bike Helmet Yes     Seat Belt Yes     Self-Exams Yes     Parent/sibling w/ CABG, MI or angioplasty before 65F 55M? Not Asked   Social History Narrative    Just joined [employer] with a friend, and the next step is to stop smoking.           Current Outpatient Medications   Medication     adalimumab (HUMIRA *CF* PEN) 40 MG/0.4ML pen kit  "    adalimumab (HUMIRA) 40 MG/0.8ML pen kit     albuterol (2.5 MG/3ML) 0.083% nebulizer solution     albuterol (PROAIR HFA/PROVENTIL HFA/VENTOLIN HFA) 108 (90 Base) MCG/ACT Inhaler     albuterol (PROAIR HFA/PROVENTIL HFA/VENTOLIN HFA) 108 (90 BASE) MCG/ACT Inhaler     azaTHIOprine (IMURAN) 50 MG tablet     calcium carbonate (OS- MG [place]. [place]) 500 MG tablet     cetirizine (ZYRTEC) 10 MG tablet     colestipol (COLESTID) 1 G tablet     cyanocobalamin (VITAMIN B12) 1000 MCG/ML injection     IBUPROFEN PO     melatonin 3 MG tablet     multivitamin, therapeutic with minerals (MULTI-VITAMIN) TABS tablet     NATURAL VITAMIN D-3 125 MCG (5000 UT) TABS     Syringe/Needle, Disp, 25G X 5/8\" 3 ML MISC     No current facility-administered medications for this visit.      Allergies   Allergen Reactions     No Known Drug Allergies          Objective:  /82   Ht 1.702 m (5' 7\")   Wt 98.7 kg (217 lb 8 oz)   BMI 34.07 kg/m      General: Alert and in no distress    Head: Normocephalic, atraumatic  Eyes: no scleral icterus or conjunctival erythema   Skin: no erythema, petechiae, or jaundice  Resp: normal respiratory effort without conversational dyspnea   Psych: normal mood and affect    Gait: Antalgic    Musculoskeletal:  -Tender over the right greater trochanteric bursa  -Bilateral hip internal and external rotation are full.  Right internal rotation is painful.      Large Joint Injection/Arthocentesis: R greater trochanteric bursa    Date/Time: 11/30/2020 9:10 AM  Performed by: Niki Jones MD  Authorized by: Niki Jones MD     Indications:  Pain  Needle Size:  22 G  Guidance: landmark guided    Approach:  Lateral  Location:  Hip      Site:  R greater trochanteric bursa  Medications:  40 mg triamcinolone 40 MG/ML  Medications comment:  6ml 0.5% bupivicaine  NDC:37332-542-58  Lot: [id number]  8/31/21          Outcome:  Tolerated well, no immediate complications  Procedure discussed: discussed " risks, benefits, and alternatives    Consent Given by:  Patient          Assessment:  1. Greater trochanteric bursitis of right hip        Plan:  Discussed the assessment with the patient and developed a plan together:  -Steroid injection performed today.  Take it easy over the next few days. Keep in mind that the steroid may take up to 3 days to start working and up to 2 weeks to reach maximal effect.    -Ice for 15-20 minutes as needed for soreness and swelling.  -Patient's preferred over the counter medications as needed for soreness.  -Provided home exercises.  Please do as indicated.    -Also discussed physical therapy    -Follow Up: As needed if symptoms fail to improve or worsen. Please call with any questions or concerns.       Cande Jones MD, CAQ Sports Medicine  [facility]

## 2020-11-30 ENCOUNTER — OFFICE VISIT (OUTPATIENT)
Dept: ORTHOPEDICS | Facility: CLINIC | Age: 50
End: 2020-11-30
Payer: COMMERCIAL

## 2020-11-30 VITALS
SYSTOLIC BLOOD PRESSURE: 122 MMHG | HEIGHT: 67 IN | WEIGHT: 217.5 LBS | BODY MASS INDEX: 34.14 KG/M2 | DIASTOLIC BLOOD PRESSURE: 82 MMHG

## 2020-11-30 DIAGNOSIS — M70.61 GREATER TROCHANTERIC BURSITIS OF RIGHT HIP: ICD-10-CM

## 2020-11-30 PROCEDURE — 99243 OFF/OP CNSLTJ NEW/EST LOW 30: CPT | Mod: 25 | Performed by: PHYSICAL MEDICINE & REHABILITATION

## 2020-11-30 PROCEDURE — 20610 DRAIN/INJ JOINT/BURSA W/O US: CPT | Mod: RT | Performed by: PHYSICAL MEDICINE & REHABILITATION

## 2020-11-30 RX ORDER — TRIAMCINOLONE ACETONIDE 40 MG/ML
40 INJECTION, SUSPENSION INTRA-ARTICULAR; INTRAMUSCULAR
Status: SHIPPED | OUTPATIENT
Start: 2020-11-30

## 2020-11-30 RX ADMIN — TRIAMCINOLONE ACETONIDE 40 MG: 40 INJECTION, SUSPENSION INTRA-ARTICULAR; INTRAMUSCULAR at 09:10

## 2020-11-30 ASSESSMENT — MIFFLIN-ST. JEOR: SCORE: 1644.2

## 2020-11-30 NOTE — PATIENT INSTRUCTIONS
Today's Plan of Care:  -Steroid injection performed today.  Take it easy over the next few days. Keep in mind that the steroid may take up to 3 days to start working and up to 2 weeks to reach maximal effect.    -Ice for 15-20 minutes as needed for soreness and swelling.  -Patient's preferred over the counter medications as needed for soreness.      -Follow Up: As needed if symptoms fail to improve or worsen. Please call with any questions or concerns.

## 2020-11-30 NOTE — LETTER
11/30/2020         RE: Mackenzie Worrell  [address]  [facility] [address]        Dear Colleague,    Thank you for referring your patient, Mackenzie Worrell, to the [facility] [place]. Please see a copy of my visit note below.    Sports Medicine Clinic Visit    PCP: Sean Alvarenga    CC: Patient presents with:  Right Hip - Pain      HPI:  Mackenzie Worrell is a 50 year old female who is seen in consultation at the request of Dr. Chaudhry for possible injection.  She notes right hip pain that began in May.  She sees rheumatology for Crohn's disease and arthritis and had mentioned it to them in June.  She notes some days are better than others.  She notes lateral hip pain.  She rates the pain at a 7/10 at its worst and a 10/10 currently.  Symptoms are relieved with ice temporarily. Symptoms are worsened by stairs and lying on the right side.  She endorses pain.   She denies popping, grinding, numbness, tingling and weakness.  Other treatment has included Tylenol and stretching.       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     Review of Systems:  Musculoskeletal: as above  Remainder of review of systems is negative including constitutional, eyes, ENT, CV, pulmonary, GI, , endocrine, skin, hematologic, and neurologic except as noted in HPI or medical history.    History reviewed. No pertinent past surgical/medical/family/social history other than as mentioned in HPI.    Patient Active Problem List   Diagnosis     Regional enteritis of small intestine with large intestine (H)     Mild intermittent asthma     Tobacco use disorder     Contact dermatitis and other eczema, due to unspecified cause     Inflammatory arthritis     CARDIOVASCULAR SCREENING; LDL GOAL LESS THAN 160     Effusion of ankle     Stress fracture of ankle     Crohn's disease (H)     S/P colectomy     Adalimumab (Humira) long-term use     Morbid obesity due to excess calories (H)     Foreign body  reaction right hand.     Rhinoconjunctivitis     Hives     Dermatographism     Past Medical History:   Diagnosis Date     Contact dermatitis and other eczema, due to unspecified cause     dishydrotic eczema/hands     Mild intermittent asthma      Osteoarthrosis, unspecified whether generalized or localized, unspecified site     arthritis     Regional enteritis of small intestine with large intestine (H)     Crohn's disease     Synovitis and tenosynovitis, unspecified     wrist tendonitis     Tobacco use disorder      Past Surgical History:   Procedure Laterality Date     C RESECT SMALL INTEST,SINGL RESEC/ANAS      Resection, Partial Small Bowel     COLONOSCOPY N/A 4/9/2018    Procedure: COMBINED COLONOSCOPY, SINGLE OR MULTIPLE BIOPSY/POLYPECTOMY BY BIOPSY;  Colonoscopy with biopsy by biopsy forceps, with polypectomy by snare;  Surgeon: Marc Robbins MD;  [facility]     EXCISE MASS EAR EXTERNAL Right 5/23/2016    Procedure: EXCISE MASS EAR EXTERNAL;  Surgeon: Asif Akhtar MD;  Location: [facility]     HC COLONOSCOPY W BIOPSY  05/18/10     HC LAPAROSCOPY, SURGICAL; CHOLECYSTECTOMY      Cholecystectomy, Laparoscopic     HC REMOVAL OF TONSILS,<11 Y/O      Tonsils <13y.o.     HERNIA REPAIR, INCISIONAL  08/13/08    Incarcerated ventral incisional hernia.     LAPAROSCOPIC RESECTION SMALL BOWEL N/A 3/9/2016    Procedure: LAPAROSCOPIC RESECTION SMALL BOWEL;  Surgeon: Mark Francisco MD;  Location: [facility]     REMOVE FOREIGN BODY HAND Right 5/31/2017    Procedure: REMOVE FOREIGN BODY HAND;  exploration and excision/removal foreign body palmar days right little finger;  Surgeon: Benjamin Vital MD;  Location:  OR     ZZC NONSPECIFIC PROCEDURE      laparoscopy for infertility workup/ adhesions     Family History   Problem Relation Age of Onset     Diabetes Father         Type II     EYE* Father         Cataract Surgery, Glaucoma     Diabetes Paternal Grandmother         Type II     Anesthesia  Reaction No family hx of      Social History     Socioeconomic History     Marital status:      Spouse name: Jamie     Number of children: 2     Years of education: 12     Highest education level: Not on file   Occupational History     Occupation: home      Employer: SELF EMLOYED   Social Needs     Financial resource strain: Not on file     Food insecurity     Worry: Not on file     Inability: Not on file     Transportation needs     Medical: Not on file     Non-medical: Not on file   Tobacco Use     Smoking status: Current Every Day Smoker     Packs/day: 0.75     Years: 25.00     Pack years: 18.75     Types: Cigarettes     Smokeless tobacco: Never Used     Tobacco comment: 1/2 pack daily, started age 20   Substance and Sexual Activity     Alcohol use: Yes     Alcohol/week: 0.0 standard drinks     Comment: rare occasion     Drug use: No     Sexual activity: Yes     Partners: Male     Birth control/protection: Surgical   Lifestyle     Physical activity     Days per week: Not on file     Minutes per session: Not on file     Stress: Not on file   Relationships     Social connections     Talks on phone: Not on file     Gets together: Not on file     Attends Jew service: Not on file     Active member of club or organization: Not on file     Attends meetings of clubs or organizations: Not on file     Relationship status: Not on file     Intimate partner violence     Fear of current or ex partner: Not on file     Emotionally abused: Not on file     Physically abused: Not on file     Forced sexual activity: Not on file   Other Topics Concern      Service No     Blood Transfusions No     Caffeine Concern Yes     Comment: Diet pop 3 cans a day     Occupational Exposure No     Hobby Hazards No     Sleep Concern No     Stress Concern No     Weight Concern No     Special Diet Yes     Comment: Diet due to Chrones     Back Care No     Exercise Yes     Bike Helmet Yes     Seat Belt Yes     Self-Exams Yes  "    Parent/sibling w/ CABG, MI or angioplasty before 65F 55M? Not Asked   Social History Narrative    Just joined [employer] with a friend, and the next step is to stop smoking.           Current Outpatient Medications   Medication     adalimumab (HUMIRA *CF* PEN) 40 MG/0.4ML pen kit     adalimumab (HUMIRA) 40 MG/0.8ML pen kit     albuterol (2.5 MG/3ML) 0.083% nebulizer solution     albuterol (PROAIR HFA/PROVENTIL HFA/VENTOLIN HFA) 108 (90 Base) MCG/ACT Inhaler     albuterol (PROAIR HFA/PROVENTIL HFA/VENTOLIN HFA) 108 (90 BASE) MCG/ACT Inhaler     azaTHIOprine (IMURAN) 50 MG tablet     calcium carbonate (OS- MG [place]. CA) 500 MG tablet     cetirizine (ZYRTEC) 10 MG tablet     colestipol (COLESTID) 1 G tablet     cyanocobalamin (VITAMIN B12) 1000 MCG/ML injection     IBUPROFEN PO     melatonin 3 MG tablet     multivitamin, therapeutic with minerals (MULTI-VITAMIN) TABS tablet     NATURAL VITAMIN D-3 125 MCG (5000 UT) TABS     Syringe/Needle, Disp, 25G X 5/8\" 3 ML MISC     No current facility-administered medications for this visit.      Allergies   Allergen Reactions     No Known Drug Allergies          Objective:  /82   Ht 1.702 m (5' 7\")   Wt 98.7 kg (217 lb 8 oz)   BMI 34.07 kg/m      General: Alert and in no distress    Head: Normocephalic, atraumatic  Eyes: no scleral icterus or conjunctival erythema   Skin: no erythema, petechiae, or jaundice  Resp: normal respiratory effort without conversational dyspnea   Psych: normal mood and affect    Gait: Antalgic    Musculoskeletal:  -Tender over the right greater trochanteric bursa  -Bilateral hip internal and external rotation are full.  Right internal rotation is painful.      Large Joint Injection/Arthocentesis: R greater trochanteric bursa    Date/Time: 11/30/2020 9:10 AM  Performed by: Niki Jones MD  Authorized by: Niki Jones MD     Indications:  Pain  Needle Size:  22 G  Guidance: landmark guided    Approach:  " Lateral  Location:  Hip      Site:  R greater trochanteric bursa  Medications:  40 mg triamcinolone 40 MG/ML  Medications comment:  4ml 0.5% bupivicaine  NDC:83758-637-07  Lot: [id number]  8/31/21          Outcome:  Tolerated well, no immediate complications  Procedure discussed: discussed risks, benefits, and alternatives    Consent Given by:  Patient          Assessment:  1. Greater trochanteric bursitis of right hip        Plan:  Discussed the assessment with the patient and developed a plan together:  -Steroid injection performed today.  Take it easy over the next few days. Keep in mind that the steroid may take up to 3 days to start working and up to 2 weeks to reach maximal effect.    -Ice for 15-20 minutes as needed for soreness and swelling.  -Patient's preferred over the counter medications as needed for soreness.  -Provided home exercises.  Please do as indicated.    -Also discussed physical therapy    -Follow Up: As needed if symptoms fail to improve or worsen. Please call with any questions or concerns.       Cande Jones MD, [facility] Medicine  [facility]      Again, thank you for allowing me to participate in the care of your patient.        Sincerely,        Niki Jones MD

## 2021-07-23 ENCOUNTER — OFFICE VISIT (OUTPATIENT)
Dept: INTERNAL MEDICINE | Facility: CLINIC | Age: 51
End: 2021-07-23

## 2021-07-23 VITALS
RESPIRATION RATE: 18 BRPM | DIASTOLIC BLOOD PRESSURE: 76 MMHG | BODY MASS INDEX: 33.08 KG/M2 | WEIGHT: 211.2 LBS | SYSTOLIC BLOOD PRESSURE: 102 MMHG | TEMPERATURE: 97 F | OXYGEN SATURATION: 100 % | HEART RATE: 100 BPM

## 2021-07-23 DIAGNOSIS — L82.0 INFLAMED SEBORRHEIC KERATOSIS: Primary | ICD-10-CM

## 2021-07-23 DIAGNOSIS — E66.01 MORBID OBESITY DUE TO EXCESS CALORIES (H): ICD-10-CM

## 2021-07-23 DIAGNOSIS — K50.00 CROHN'S DISEASE OF SMALL INTESTINE WITHOUT COMPLICATION (H): ICD-10-CM

## 2021-07-23 PROCEDURE — 99212 OFFICE O/P EST SF 10 MIN: CPT | Mod: 25 | Performed by: INTERNAL MEDICINE

## 2021-07-23 PROCEDURE — 17110 DESTRUCTION B9 LES UP TO 14: CPT | Performed by: INTERNAL MEDICINE

## 2021-07-23 ASSESSMENT — PAIN SCALES - GENERAL: PAINLEVEL: NO PAIN (0)

## 2021-07-23 NOTE — PROGRESS NOTES
Subjective   Mackenzie is a 51 year old who presents for the following health issues     HPI     Chief Complaint   Patient presents with     Derm Problem     mole on right collar bone, noticed changing 1 week ago. No pain or itching        EMR reviewed including:             Complaint, History of Chief Complaint, Corresponding Review of Systems, and Complaint Specific Physical Examination.    #1   Patient has a skin lesion on her mid anterior right clavicular area.  There is a 0.3 mm seborrheic keratosis.  Patient concerned regarding family history of skin cancer.  Patient wants removed.      #2   History of Crohn's disease post colectomy.  Follow exclusively with gastroenterologist.          Vital Signs:   /76 (BP Location: Right arm, Patient Position: Sitting, Cuff Size: Adult Large)   Pulse 100   Temp 97  F (36.1  C) (Temporal)   Resp 18   Wt 95.8 kg (211 lb 3.2 oz)   LMP 04/10/2021   SpO2 100%   BMI 33.08 kg/m               Decision Making    Problem and Complexity     1. Inflamed seborrheic keratosis  Treated with liquid nitrogen application.  3 applications with a total coverage area of less than 1 cm .  Discussed the benign nature of lesion.  Patient denies any other skin lesions of concern.      2. Crohn's disease of small intestine without complication (H)  Follow with gastroenterology and continue current biologic therapy.        And obesity.  Recommend weight loss responsible caloric restriction and exercise.  ------------------------------------------------------------------------------------------------------------------------------  Data    4=1/3 5=2/3    1  >3  Specialty (external) notes reviewed:   GI notes appreciated  Individual tests ordered (by provider) reviewed:   None  Individual tests ordered (by provider):   None  Independent Historians Interview:   None  2  Review of outside (other providers) Tests:   None  3   Verbal Discussion with Specialists:     None    ----------------------------------------------------------------------------------------------------------------------------------  Risk   Prescription drug management:   None                                High risk for toxicity:   Decision regarding surgery:    None   Social determinants of health:   None   Decision regarding hospitalization:     None   Decision to withhold therapy:   None                                 FOLLOW UP   I have asked the patient to make an appointment for followup with me in the next month.  Recommend physical examination with Pap smear, mammogram etc.            I have carefully explained the diagnosis and treatment options to the patient.  The patient has displayed an understanding of the above, and all subsequent questions were answered.      DO TAMARA Jean-Baptiste    Portions of this note were produced using [employer]  Although every attempt at real-time proof reading has been made, occasional grammar/syntax errors may have been missed.

## 2021-08-13 ENCOUNTER — TRANSFERRED RECORDS (OUTPATIENT)
Dept: HEALTH INFORMATION MANAGEMENT | Facility: CLINIC | Age: 51
End: 2021-08-13

## 2021-08-26 ENCOUNTER — NURSE TRIAGE (OUTPATIENT)
Dept: NURSING | Facility: CLINIC | Age: 51
End: 2021-08-26

## 2021-08-26 NOTE — TELEPHONE ENCOUNTER
Mackenzie is calling and states that she has a cold and a bad cough and nasal drainage and denies fever.  Denies shortness of breath.  Coughing up clear mucus. Denies blood in sputum or cough.  Cough is keeping Mackenzie up at night.  Patient states that she will go to urgent care.    COVID 19 Nurse Triage Plan/Patient Instructions    Please be aware that novel coronavirus (COVID-19) may be circulating in the community. If you develop symptoms such as fever, cough, or SOB or if you have concerns about the presence of another infection including coronavirus (COVID-19), please contact your health care provider or visit [email or web address].     Disposition/Instructions    Virtual Visit with provider recommended. Reference Visit Selection Guide.    Thank you for taking steps to prevent the spread of this virus.  o Limit your contact with others.  o Wear a simple mask to cover your cough.  o Wash your hands well and often.    Resources    [facility]: About COVID-19: [email or web address]    CDC: What to Do If You're Sick: www.cdc.gov/coronavirus/2019-ncov/about/steps-when-sick.html    CDC: Ending Home Isolation: www.cdc.gov/coronavirus/2019-ncov/hcp/disposition-in-home-patients.html     CDC: Caring for Someone: www.cdc.gov/coronavirus/2019-ncov/if-you-are-sick/care-for-someone.html     [facility]: Interim Guidance for Hospital Discharge to Home: www.health.Count includes the [facility].mn.us/diseases/coronavirus/hcp/hospdischarge.pdf    [facility] clinical trials (COVID-19 research studies): clinicalaffairs.[facility]-clinical-trials     Below are the COVID-19 hotlines at the [place] Department of Health ([facility]). Interpreters are available.   o For health questions: Call 773-735-8624 or 1-761.647.5869 (7 a.m. to 7 p.m.)  o For questions about schools and childcare: Call 639-807-8754 or 1-594.196.1685 (7 a.m. to 7 p.m.)                        Reason for Disposition    [1] Continuous (nonstop) coughing interferes  with work or school AND [2] no improvement using cough treatment per Care Advice    Additional Information    Negative: Severe difficulty breathing (e.g., struggling for each breath, speaks in single words)    Negative: Bluish (or gray) lips or face now    Negative: [1] Difficulty breathing AND [2] exposure to flames, smoke, or fumes    Negative: [1] Stridor AND [2] difficulty breathing    Negative: Sounds like a life-threatening emergency to the triager    Negative: Chest pain  (Exception: MILD central chest pain, present only when coughing)    Negative: Difficulty breathing    Negative: Patient sounds very sick or weak to the triager    Negative: [1] Coughed up blood AND [2] > 1 tablespoon (15 ml) (Exception: blood-tinged sputum)    Negative: Fever > 103 F (39.4 C)    Negative: [1] Fever > 101 F (38.3 C) AND [2] age > 60    Negative: [1] Fever > 100.0 F (37.8 C) AND [2] bedridden (e.g., nursing home patient, CVA, chronic illness, recovering from surgery)    Negative: [1] Fever > 100.0 F (37.8 C) AND [2] diabetes mellitus or weak immune system (e.g., HIV positive, cancer chemo, splenectomy, organ transplant, chronic steroids)    Negative: Wheezing is present    Negative: SEVERE coughing spells (e.g., whooping sound after coughing, vomiting after coughing)    Protocols used: COUGH - ACUTE [id number]

## 2021-10-21 ENCOUNTER — OFFICE VISIT (OUTPATIENT)
Dept: OBGYN | Facility: OTHER | Age: 51
End: 2021-10-21
Payer: COMMERCIAL

## 2021-10-21 VITALS
HEART RATE: 93 BPM | DIASTOLIC BLOOD PRESSURE: 86 MMHG | TEMPERATURE: 98 F | BODY MASS INDEX: 31.99 KG/M2 | SYSTOLIC BLOOD PRESSURE: 122 MMHG | WEIGHT: 204.25 LBS

## 2021-10-21 DIAGNOSIS — Z12.4 SCREENING FOR MALIGNANT NEOPLASM OF CERVIX: ICD-10-CM

## 2021-10-21 DIAGNOSIS — N93.9 ABNORMAL UTERINE BLEEDING (AUB): Primary | ICD-10-CM

## 2021-10-21 PROCEDURE — G0124 SCREEN C/V THIN LAYER BY MD: HCPCS | Performed by: PATHOLOGY

## 2021-10-21 PROCEDURE — 87624 HPV HI-RISK TYP POOLED RSLT: CPT | Performed by: ADVANCED PRACTICE MIDWIFE

## 2021-10-21 PROCEDURE — 99203 OFFICE O/P NEW LOW 30 MIN: CPT | Performed by: ADVANCED PRACTICE MIDWIFE

## 2021-10-21 PROCEDURE — G0145 SCR C/V CYTO,THINLAYER,RESCR: HCPCS | Performed by: ADVANCED PRACTICE MIDWIFE

## 2021-10-21 PROCEDURE — 88305 TISSUE EXAM BY PATHOLOGIST: CPT | Performed by: PATHOLOGY

## 2021-10-21 PROCEDURE — 58100 BIOPSY OF UTERUS LINING: CPT | Performed by: ADVANCED PRACTICE MIDWIFE

## 2021-10-21 NOTE — PROGRESS NOTES
Mackenzie Worrell is a 51 year old who presents to the clinic for evaluation of AUB.   LMP 4/2021.  Noted spotting primarily pink when wiping since August on and off. Sure it is vaginal.  Currently not sexually active.   Menarche age 11-11 with regular cycles.  COCs for 5 years from [age].  2 term pregnancies.   She is currently in need of two surgeries, one for a narrowing of her bowel and the other to remove a pancreatic cyst.   If the bleeding is determined to be a problem she would like to combine surgeries if possible.       Histories reviewed and updated  Past Medical History:   Diagnosis Date     Contact dermatitis and other eczema, due to unspecified cause     dishydrotic eczema/hands     Mild intermittent asthma      Osteoarthrosis, unspecified whether generalized or localized, unspecified site     arthritis     Regional enteritis of small intestine with large intestine (H)     Crohn's disease     Synovitis and tenosynovitis, unspecified     wrist tendonitis     Tobacco use disorder      Past Surgical History:   Procedure Laterality Date     C RESECT SMALL INTEST,SINGL RESEC/ANAS      Resection, Partial Small Bowel     COLONOSCOPY N/A 4/9/2018    Procedure: COMBINED COLONOSCOPY, SINGLE OR MULTIPLE BIOPSY/POLYPECTOMY BY BIOPSY;  Colonoscopy with biopsy by biopsy forceps, with polypectomy by snare;  Surgeon: Marc Robbins MD;  [facility]     EXCISE MASS EAR EXTERNAL Right 5/23/2016    Procedure: EXCISE MASS EAR EXTERNAL;  Surgeon: Asif Akhtar MD;  Location: [facility]     HC LAPAROSCOPY, SURGICAL; CHOLECYSTECTOMY      Cholecystectomy, Laparoscopic     HC REMOVAL OF TONSILS,<11 Y/O      Tonsils <13y.o.     HERNIA REPAIR, INCISIONAL  08/13/08    Incarcerated ventral incisional hernia.     LAPAROSCOPIC RESECTION SMALL BOWEL N/A 3/9/2016    Procedure: LAPAROSCOPIC RESECTION SMALL BOWEL;  Surgeon: Mark Francisco MD;  Location: [facility]     REMOVE FOREIGN BODY HAND Right 5/31/2017     Procedure: REMOVE FOREIGN BODY HAND;  exploration and excision/removal foreign body palmar days right little finger;  Surgeon: Benjamin Vital MD;  Location:  OR     [facility] NONSPECIFIC PROCEDURE      laparoscopy for infertility workup/ adhesions     [facility]  [facility]     Social History     Socioeconomic History     Marital status:      Spouse name: Jamie     Number of children: 2     Years of education: 12     Highest education level: Not on file   Occupational History     Occupation: home      Employer: SELF E[employer]OYED   Tobacco Use     Smoking status: Former Smoker     Packs/day: 0.75     Years: 25.00     Pack years: 18.75     Types: Cigarettes     Smokeless tobacco: Never Used     Tobacco comment: 1/2 pack daily, started age 20   Vaping Use     Vaping Use: Never used   Substance and Sexual Activity     Alcohol use: Yes     Alcohol/week: 0.0 standard drinks     Comment: rare occasion     Drug use: No     Sexual activity: Yes     Partners: Male     Birth control/protection: Surgical   Other Topics Concern      Service No     Blood Transfusions No     Caffeine Concern Yes     Comment: Diet pop 3 cans a day     Occupational Exposure No     Hobby Hazards No     Sleep Concern No     Stress Concern No     Weight Concern No     Special Diet Yes     Comment: Diet due to Chrones     Back Care No     Exercise Yes     Bike Helmet Yes     Seat Belt Yes     Self-Exams Yes     Parent/sibling w/ CABG, MI or angioplasty before 65F 55M? Not Asked   Social History Narrative    Just joined [employer] with a friend, and the next step is to stop smoking.     Social Determinants of Health     Financial Resource Strain:      Difficulty of Paying Living Expenses:    Food Insecurity:      Worried About Running Out of Food in the Last Year:      Ran Out of Food in the Last Year:    Transportation Needs:      Lack of Transportation (Medical):      Lack of Transportation (Non-Medical):    Physical  Activity:      Days of Exercise per Week:      Minutes of Exercise per Session:    Stress:      Feeling of Stress :    Social Connections:      Frequency of Communication with Friends and Family:      Frequency of Social Gatherings with Friends and Family:      Attends Confucianist Services:      Active Member of Clubs or Organizations:      Attends Club or Organization Meetings:      Marital Status:    Intimate Partner Violence:      Fear of Current or Ex-Partner:      Emotionally Abused:      Physically Abused:      Sexually Abused:      Family History   Problem Relation Age of Onset     Diabetes Father         Type II     EYE* Father         Cataract Surgery, Glaucoma     Diabetes Paternal Grandmother         Type II     Anesthesia Reaction No family hx of           ROS: 10 point ROS neg other than the symptoms noted above in the HPI.    EXAM:  /86 (BP Location: Left arm, Patient Position: Sitting, Cuff Size: Adult Large)   Pulse 93   Temp 98  F (36.7  C) (Oral)   Wt 92.6 kg (204 lb 4 oz)   LMP 04/13/2021 (Approximate)   BMI 31.99 kg/m    : PELVIC EXAM:  Vulva: No external lesions, normal hair distribution, no adenopathy, BUS WNL  Vagina: Moist, pink, no abnormal discharge, well rugated, no lesions  Cervix:, smooth, pink, no visible lesions, neg CMT  Uterus: Normal size, anteverted, non-tender, mobile  Ovaries: No mass, non-tender, mobile  Rectal exam: deferred  Psych:  alert, with normal speech and behavior        ASSESSMENT/PLAN:  (N93.9) Abnormal uterine bleeding (AUB)  (primary encounter diagnosis)  Comment:   Plan: Surgical Pathology Exam, US Pelvic Complete         with Transvaginal            (Z12.4) Screening for malignant neoplasm of cervix  Comment:   Plan: Pap screen with HPV - recommended age 30 - 65         years                  Labs were not reviewed in [facility]     Imaging was reviewed in Epic. And the results were CT shows unremarkable uterus and ovaries.   Reviewed COVID vaccine.  Had  COVID in March.  Recommended that she discuss with her internist, recommended vaccine.    34 minutes spent on the date of the encounter doing chart review, review of outside records, review of test results, patient visit and documentation   Not including procedure.

## 2021-10-21 NOTE — PATIENT INSTRUCTIONS
What you may expect after an endometrial biopsy:  Mild cramping for less than 48 hours is to be expected, if you have can take ibuprofen or Motrin you may use this for the cramping.   A small amount of bleeding would be considered normal as well.    You may resume your normal activities including sexual intercourse as soon as you feel ready.       WARNING SIGNS:  If you are experiencing:  Fever  Foul smelling vaginal discharge  Cramping lasting longer than 48 hours  Severe cramping  Bleeding heavier than a period  CALL THE CLINIC IMMEDIATELY at [phone].      You will be contacted with the results of your biopsy in about one week.   A follow up plan will be made with you when your results are available.

## 2021-10-22 ENCOUNTER — ANCILLARY PROCEDURE (OUTPATIENT)
Dept: ULTRASOUND IMAGING | Facility: OTHER | Age: 51
End: 2021-10-22
Attending: ADVANCED PRACTICE MIDWIFE
Payer: COMMERCIAL

## 2021-10-22 DIAGNOSIS — N93.9 ABNORMAL UTERINE BLEEDING (AUB): ICD-10-CM

## 2021-10-22 PROCEDURE — 76856 US EXAM PELVIC COMPLETE: CPT | Performed by: RADIOLOGY

## 2021-10-22 PROCEDURE — 76830 TRANSVAGINAL US NON-OB: CPT | Performed by: RADIOLOGY

## 2021-10-23 ENCOUNTER — HEALTH MAINTENANCE LETTER (OUTPATIENT)
Age: 51
End: 2021-10-23

## 2021-10-25 LAB
PATH REPORT.COMMENTS IMP SPEC: NORMAL
PATH REPORT.COMMENTS IMP SPEC: NORMAL
PATH REPORT.FINAL DX SPEC: NORMAL
PATH REPORT.GROSS SPEC: NORMAL
PATH REPORT.MICROSCOPIC SPEC OTHER STN: NORMAL
PATH REPORT.RELEVANT HX SPEC: NORMAL
PHOTO IMAGE: NORMAL

## 2021-10-25 NOTE — PROGRESS NOTES
Mackenzie Worrell is a 51 year old who presents to the clinic for an endometrial biopsy due to   AUB     Procedure:  Endometrial biopsy      Discussed risk of bleeding, infection, uterine perforation, cramping pain.  Pt agreed to proceed with procedure after all questions answered and consent signed.      Speculum placed and cervix visualized.  Cervix cleansed with betadine x 3.   Endometrial biopsy pipelle passed through cervix and uterus sounded to 7 cm.  Biopsy specimen collected with one pass with return of moderate amount of pink tissue.  Specimen placed in a labeled container and set aside to be sent to pathology.   No bleeding noted from cervical os.     Patient tolerated the procedure well.  There were no apparent complications and bleeding was minimal.    See AVS for instructions

## 2021-10-26 LAB
BKR LAB AP GYN ADEQUACY: ABNORMAL
BKR LAB AP GYN INTERPRETATION: ABNORMAL
BKR LAB AP GYN OTHER FINDINGS: ABNORMAL
BKR LAB AP HPV REFLEX: ABNORMAL
BKR LAB AP LMP: ABNORMAL
BKR LAB AP PREVIOUS ABNORMAL: ABNORMAL
PATH REPORT.COMMENTS IMP SPEC: ABNORMAL
PATH REPORT.RELEVANT HX SPEC: ABNORMAL

## 2021-10-27 ENCOUNTER — HOSPITAL ENCOUNTER (EMERGENCY)
Facility: CLINIC | Age: 51
Discharge: HOME | End: 2021-10-27
Attending: FAMILY MEDICINE | Admitting: FAMILY MEDICINE
Payer: COMMERCIAL

## 2021-10-27 ENCOUNTER — APPOINTMENT (OUTPATIENT)
Dept: GENERAL RADIOLOGY | Facility: CLINIC | Age: 51
End: 2021-10-27
Attending: FAMILY MEDICINE
Payer: COMMERCIAL

## 2021-10-27 VITALS
OXYGEN SATURATION: 93 % | TEMPERATURE: 97.9 F | DIASTOLIC BLOOD PRESSURE: 79 MMHG | HEART RATE: 67 BPM | BODY MASS INDEX: 33.83 KG/M2 | WEIGHT: 216 LBS | RESPIRATION RATE: 16 BRPM | SYSTOLIC BLOOD PRESSURE: 111 MMHG

## 2021-10-27 DIAGNOSIS — K50.00 CROHN'S DISEASE OF SMALL INTESTINE WITHOUT COMPLICATION (H): ICD-10-CM

## 2021-10-27 DIAGNOSIS — R11.0 NAUSEA: ICD-10-CM

## 2021-10-27 DIAGNOSIS — R10.9 ABDOMINAL CRAMPING: ICD-10-CM

## 2021-10-27 LAB
ALBUMIN SERPL-MCNC: 3.3 G/DL (ref 3.4–5)
ALP SERPL-CCNC: 75 U/L (ref 40–150)
ALT SERPL W P-5'-P-CCNC: 26 U/L (ref 0–50)
ANION GAP SERPL CALCULATED.3IONS-SCNC: 6 MMOL/L (ref 3–14)
AST SERPL W P-5'-P-CCNC: 26 U/L (ref 0–45)
BASOPHILS # BLD AUTO: 0 10E3/UL (ref 0–0.2)
BASOPHILS NFR BLD AUTO: 1 %
BILIRUB SERPL-MCNC: 0.5 MG/DL (ref 0.2–1.3)
BUN SERPL-MCNC: 5 MG/DL (ref 7–30)
CALCIUM SERPL-MCNC: 8 MG/DL (ref 8.5–10.1)
CHLORIDE BLD-SCNC: 108 MMOL/L (ref 94–109)
CO2 SERPL-SCNC: 23 MMOL/L (ref 20–32)
CREAT SERPL-MCNC: 0.6 MG/DL (ref 0.52–1.04)
EOSINOPHIL # BLD AUTO: 0.1 10E3/UL (ref 0–0.7)
EOSINOPHIL NFR BLD AUTO: 2 %
ERYTHROCYTE [DISTWIDTH] IN BLOOD BY AUTOMATED COUNT: 12.2 % (ref 10–15)
GFR SERPL CREATININE-BSD FRML MDRD: >90 ML/MIN/1.73M2
GLUCOSE BLD-MCNC: 115 MG/DL (ref 70–99)
HCT VFR BLD AUTO: 39.5 % (ref 35–47)
HGB BLD-MCNC: 13.7 G/DL (ref 11.7–15.7)
HOLD SPECIMEN: NORMAL
HOLD SPECIMEN: NORMAL
IMM GRANULOCYTES # BLD: 0 10E3/UL
IMM GRANULOCYTES NFR BLD: 0 %
LIPASE SERPL-CCNC: 57 U/L (ref 73–393)
LYMPHOCYTES # BLD AUTO: 2.3 10E3/UL (ref 0.8–5.3)
LYMPHOCYTES NFR BLD AUTO: 42 %
MCH RBC QN AUTO: 38.2 PG (ref 26.5–33)
MCHC RBC AUTO-ENTMCNC: 34.7 G/DL (ref 31.5–36.5)
MCV RBC AUTO: 110 FL (ref 78–100)
MONOCYTES # BLD AUTO: 0.6 10E3/UL (ref 0–1.3)
MONOCYTES NFR BLD AUTO: 11 %
NEUTROPHILS # BLD AUTO: 2.4 10E3/UL (ref 1.6–8.3)
NEUTROPHILS NFR BLD AUTO: 44 %
NRBC # BLD AUTO: 0 10E3/UL
NRBC BLD AUTO-RTO: 0 /100
PLATELET # BLD AUTO: 204 10E3/UL (ref 150–450)
POTASSIUM BLD-SCNC: 3.7 MMOL/L (ref 3.4–5.3)
PROT SERPL-MCNC: 7.4 G/DL (ref 6.8–8.8)
RBC # BLD AUTO: 3.59 10E6/UL (ref 3.8–5.2)
SODIUM SERPL-SCNC: 137 MMOL/L (ref 133–144)
WBC # BLD AUTO: 5.6 10E3/UL (ref 4–11)

## 2021-10-27 PROCEDURE — 36415 COLL VENOUS BLD VENIPUNCTURE: CPT | Performed by: FAMILY MEDICINE

## 2021-10-27 PROCEDURE — 250N000011 HC RX IP 250 OP 636: Performed by: FAMILY MEDICINE

## 2021-10-27 PROCEDURE — 258N000003 HC RX IP 258 OP 636: Performed by: FAMILY MEDICINE

## 2021-10-27 PROCEDURE — 96361 HYDRATE IV INFUSION ADD-ON: CPT | Performed by: FAMILY MEDICINE

## 2021-10-27 PROCEDURE — 85025 COMPLETE CBC W/AUTO DIFF WBC: CPT | Performed by: FAMILY MEDICINE

## 2021-10-27 PROCEDURE — 96374 THER/PROPH/DIAG INJ IV PUSH: CPT | Performed by: FAMILY MEDICINE

## 2021-10-27 PROCEDURE — 96375 TX/PRO/DX INJ NEW DRUG ADDON: CPT | Performed by: FAMILY MEDICINE

## 2021-10-27 PROCEDURE — 96376 TX/PRO/DX INJ SAME DRUG ADON: CPT | Performed by: FAMILY MEDICINE

## 2021-10-27 PROCEDURE — 74019 RADEX ABDOMEN 2 VIEWS: CPT

## 2021-10-27 PROCEDURE — 82040 ASSAY OF SERUM ALBUMIN: CPT | Performed by: FAMILY MEDICINE

## 2021-10-27 PROCEDURE — 99285 EMERGENCY DEPT VISIT HI MDM: CPT | Performed by: FAMILY MEDICINE

## 2021-10-27 PROCEDURE — 99285 EMERGENCY DEPT VISIT HI MDM: CPT | Mod: 25 | Performed by: FAMILY MEDICINE

## 2021-10-27 PROCEDURE — 83690 ASSAY OF LIPASE: CPT | Performed by: FAMILY MEDICINE

## 2021-10-27 RX ORDER — HYDROCODONE BITARTRATE AND ACETAMINOPHEN 5; 325 MG/1; MG/1
1 TABLET ORAL EVERY 6 HOURS PRN
Qty: 12 TABLET | Refills: 0 | Status: SHIPPED | OUTPATIENT
Start: 2021-10-27 | End: 2021-10-30

## 2021-10-27 RX ORDER — SODIUM CHLORIDE 9 MG/ML
INJECTION, SOLUTION INTRAVENOUS CONTINUOUS
Status: DISCONTINUED | OUTPATIENT
Start: 2021-10-27 | End: 2021-10-27 | Stop reason: HOSPADM

## 2021-10-27 RX ORDER — HYDROMORPHONE HYDROCHLORIDE 1 MG/ML
0.5 INJECTION, SOLUTION INTRAMUSCULAR; INTRAVENOUS; SUBCUTANEOUS
Status: COMPLETED | OUTPATIENT
Start: 2021-10-27 | End: 2021-10-27

## 2021-10-27 RX ORDER — ONDANSETRON 2 MG/ML
4 INJECTION INTRAMUSCULAR; INTRAVENOUS EVERY 30 MIN PRN
Status: DISCONTINUED | OUTPATIENT
Start: 2021-10-27 | End: 2021-10-27 | Stop reason: HOSPADM

## 2021-10-27 RX ORDER — OXYCODONE HYDROCHLORIDE 5 MG/1
TABLET ORAL
COMMUNITY
Start: 2021-10-02

## 2021-10-27 RX ADMIN — ONDANSETRON 4 MG: 2 INJECTION INTRAMUSCULAR; INTRAVENOUS at 08:08

## 2021-10-27 RX ADMIN — HYDROMORPHONE HYDROCHLORIDE 0.5 MG: 1 INJECTION, SOLUTION INTRAMUSCULAR; INTRAVENOUS; SUBCUTANEOUS at 06:47

## 2021-10-27 RX ADMIN — SODIUM CHLORIDE 500 ML: 9 INJECTION, SOLUTION INTRAVENOUS at 06:47

## 2021-10-27 RX ADMIN — ONDANSETRON 4 MG: 2 INJECTION INTRAMUSCULAR; INTRAVENOUS at 06:47

## 2021-10-27 RX ADMIN — HYDROMORPHONE HYDROCHLORIDE 0.5 MG: 1 INJECTION, SOLUTION INTRAMUSCULAR; INTRAVENOUS; SUBCUTANEOUS at 08:11

## 2021-10-27 RX ADMIN — HYDROMORPHONE HYDROCHLORIDE 0.5 MG: 1 INJECTION, SOLUTION INTRAMUSCULAR; INTRAVENOUS; SUBCUTANEOUS at 07:15

## 2021-10-27 RX ADMIN — SODIUM CHLORIDE: 9 INJECTION, SOLUTION INTRAVENOUS at 06:46

## 2021-10-27 ASSESSMENT — ENCOUNTER SYMPTOMS
ABDOMINAL DISTENTION: 1
SLEEP DISTURBANCE: 1
BLOOD IN STOOL: 0
EYES NEGATIVE: 1
CHILLS: 1
FEVER: 0
VOMITING: 0
APPETITE CHANGE: 1
NAUSEA: 1
CARDIOVASCULAR NEGATIVE: 1
RESPIRATORY NEGATIVE: 1
ABDOMINAL PAIN: 1
BACK PAIN: 1
NEUROLOGICAL NEGATIVE: 1

## 2021-10-27 NOTE — ED TRIAGE NOTES
Patient having abdominal pain. She is scheduled to have surgery to resect scar tissue and adhesions at the [facility] but the pain was too bad tonight. She usually doctors for her GI needs at [facility].

## 2021-10-27 NOTE — ED PROVIDER NOTES
Transfer of Care Note  This patient was signed out to me and I assumed care from Dr. Manley at change of shift.  Mackenzie Worrell is a 51 year old female who presented to the emergency department with a chief complaint of Abdominal Pain  .  Please see the original providers history and physical for complete details.    The following issues were signed out to me to follow up on:  Labs and disposition      Pertant Lab/Imaging Findings During this Visit was     Results for orders placed or performed during the hospital encounter of 10/27/21 (from the past 24 hour(s))   [place] Draw    Narrative    The following orders were created for panel order [place] Draw.  Procedure                               Abnormality         Status                     ---------                               -----------         ------                     Extra Green Top (Lithium...[[id number]]                      Final result               Extra Purple Top Tube[[id number]]                            Final result                 Please view results for these tests on the individual orders.   Extra Green Top (Lithium Heparin) Tube   Result Value Ref Range    Hold Specimen JIC    Extra Purple Top Tube   Result Value Ref Range    Hold Specimen JIC    CBC with platelets differential    Narrative    The following orders were created for panel order CBC with platelets differential.  Procedure                               Abnormality         Status                     ---------                               -----------         ------                     CBC with platelets and d...[[id number]]  Abnormal            Final result                 Please view results for these tests on the individual orders.   Comprehensive metabolic panel   Result Value Ref Range    Sodium 137 133 - 144 mmol/L    Potassium 3.7 3.4 - 5.3 mmol/L    Chloride 108 94 - 109 mmol/L    Carbon Dioxide (CO2) 23 20 - 32 mmol/L    Anion Gap 6 3 - 14 mmol/L    Urea Nitrogen 5  (L) 7 - 30 mg/dL    Creatinine 0.60 0.52 - 1.04 mg/dL    Calcium 8.0 (L) 8.5 - 10.1 mg/dL    Glucose 115 (H) 70 - 99 mg/dL    Alkaline Phosphatase 75 40 - 150 U/L    AST 26 0 - 45 U/L    ALT 26 0 - 50 U/L    Protein Total 7.4 6.8 - 8.8 g/dL    Albumin 3.3 (L) 3.4 - 5.0 g/dL    Bilirubin Total 0.5 0.2 - 1.3 mg/dL    GFR Estimate >90 >60 mL/min/1.73m2   Lipase   Result Value Ref Range    Lipase 57 (L) 73 - 393 U/L   CBC with platelets and differential   Result Value Ref Range    WBC Count 5.6 4.0 - 11.0 10e3/uL    RBC Count 3.59 (L) 3.80 - 5.20 10e6/uL    Hemoglobin 13.7 11.7 - 15.7 g/dL    Hematocrit 39.5 35.0 - 47.0 %     (H) 78 - 100 fL    MCH 38.2 (H) 26.5 - 33.0 pg    MCHC 34.7 31.5 - 36.5 g/dL    RDW 12.2 10.0 - 15.0 %    Platelet Count 204 150 - 450 10e3/uL    % Neutrophils 44 %    % Lymphocytes 42 %    % Monocytes 11 %    % Eosinophils 2 %    % Basophils 1 %    % Immature Granulocytes 0 %    NRBCs per 100 WBC 0 <1 /100    Absolute Neutrophils 2.4 1.6 - 8.3 10e3/uL    Absolute Lymphocytes 2.3 0.8 - 5.3 10e3/uL    Absolute Monocytes 0.6 0.0 - 1.3 10e3/uL    Absolute Eosinophils 0.1 0.0 - 0.7 10e3/uL    Absolute Basophils 0.0 0.0 - 0.2 10e3/uL    Absolute Immature Granulocytes 0.0 <=0.0 10e3/uL    Absolute NRBCs 0.0 10e3/uL   XR Abdomen 2 Views    Narrative    ABDOMEN TWO-THREE VIEW  10/27/2021 8:54 AM     HISTORY: Abdominal pain, vomiting.    COMPARISON: 2/6/2014.    FINDINGS: Small amount of stool. No free air. There are no air filled  distended loops of small bowel. The colon is not distended. The lung  bases are unremarkable.      Impression    IMPRESSION: Nonobstructed bowel gas pattern.          My focused follow up physical exam shows:   Vitals:  B/P: 139/93, T: 97.9, P: 76, R: 16  Gen:  Pt appears stable and no apparent distress      ED Course & Medical Decision Making:  Labs came back and were completely unremarkable.  According to Dr. Manley's note and confirmed with the patient, she would like  to try and avoid a CAT scan she has been having multiple ones of these before.  I think with her labs being normal and vitals being normal, I do not see any indication to do a CAT scan.  She did get nauseated here again I think just doing a plain x-ray to rule out a bowel obstruction would be reasonable.  X-ray was done and was normal.  Patient received 2 doses of Zofran and is feeling much better.  At this point we will plan on discharging the patient home with some hydrocodone to help with her acute pain flare, she has nausea medicine at home that she will continue to use.  Patient will follow up with her GI people at [place] and her doctors down at [place].         Impression and Disposition:  Abdominal pain           This note was completed in part using Dragon voice recognition, and may contain word and grammatical errors.        Hermilo Dennison MD  10/27/21 0974

## 2021-10-27 NOTE — ED PROVIDER NOTES
History     Chief Complaint   Patient presents with     Abdominal Pain     HPI  Mackenzie Worrell is a 51 year old female who presents to emergency room with her  secondary concerns of increasing with severe abdominal pain and cramping with nausea symptoms but without vomiting.  Patient states over the last 2 days the pain is gotten more severe.  Patient states that she has a history for Crohn's disease and has had a long history abdominal issues.  She has had increased symptoms since August of this year.  She states that her gastrointestinal specialist in [place] has determined that she has some adhesions surrounding the bowel on her right intestinal tract causing her symptoms.  She was referred to the [facility] and his surgery is planned for this next month.  She states that she has been using some occasional oxycodone to help with the pain but that has not been helpful over the last 2 days.  She has not noticed any bloody stools or bloody urination.  She denies any pain with urination.  She states that she is basically on a liquid diet and has been for the last month because of the abdominal issues.  She states that she has had multiple scans and would like to potentially avoid additional imaging of possible today unless it is absolutely needed.    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 06/13/2017     Priority: Medium     Morbid obesity due to excess calories (H) 05/21/2017     Priority: Medium     Adalimumab (Humira) long-term use 05/18/2016     Priority: Medium     S/P colectomy 03/09/2016     Priority: Medium     Crohn's disease (H) 02/06/2014     Priority: Medium     Stress fracture of ankle  12/19/2013     Priority: Medium     Effusion of ankle 11/19/2013     Priority: Medium     CARDIOVASCULAR SCREENING; LDL GOAL LESS THAN 160 10/31/2010     Priority: Medium     Inflammatory arthritis 09/02/2008     Priority: Medium     Regional enteritis of small intestine with large intestine (H)      Priority: Medium     Mild intermittent asthma      Priority: Medium     Contact dermatitis and other eczema, due to unspecified cause      Priority: Medium        Past Medical History:    Past Medical History:   Diagnosis Date     Contact dermatitis and other eczema, due to unspecified cause      Mild intermittent asthma      Osteoarthrosis, unspecified whether generalized or localized, unspecified site      Regional enteritis of small intestine with large intestine (H)      Synovitis and tenosynovitis, unspecified      Tobacco use disorder        Past Surgical History:    Past Surgical History:   Procedure Laterality Date     C RESECT SMALL INTEST,SINGL RESEC/ANAS      Resection, Partial Small Bowel     COLONOSCOPY N/A 4/9/2018    Procedure: COMBINED COLONOSCOPY, SINGLE OR MULTIPLE BIOPSY/POLYPECTOMY BY BIOPSY;  Colonoscopy with biopsy by biopsy forceps, with polypectomy by snare;  Surgeon: Marc Robbins MD;  Location: [facility]     EXCISE MASS EAR EXTERNAL Right 5/23/2016    Procedure: EXCISE MASS EAR EXTERNAL;  Surgeon: Asif Akhtar MD;  Location: [facility] OR     HC LAPAROSCOPY, SURGICAL; CHOLECYSTECTOMY      Cholecystectomy, Laparoscopic     HC REMOVAL OF TONSILS,<13 Y/O      Tonsils <13y.o.     HERNIA REPAIR, INCISIONAL  08/13/08    Incarcerated ventral incisional hernia.     LAPAROSCOPIC RESECTION SMALL BOWEL N/A 3/9/2016    Procedure: LAPAROSCOPIC RESECTION SMALL BOWEL;  Surgeon: Mark Francisco MD;  Location: [facility]     REMOVE FOREIGN BODY HAND Right 5/31/2017    Procedure: REMOVE FOREIGN BODY HAND;  exploration and excision/removal foreign body palmar days right little finger;  Surgeon: Zahraa  "Benjamin Temple MD;  Location:  OR     [facility] NONSPECIFIC PROCEDURE      laparoscopy for infertility workup/ adhesions     [facility] COLONOSCOPY W BIOPSY  05/18/10       Family History:    Family History   Problem Relation Age of Onset     Diabetes Father         Type II     EYE* Father         Cataract Surgery, Glaucoma     Diabetes Paternal Grandmother         Type II     Anesthesia Reaction No family hx of        Social History:  Marital Status:   [2]  Social History     Tobacco Use     Smoking status: Former Smoker     Packs/day: 0.75     Years: 25.00     Pack years: 18.75     Types: Cigarettes     Smokeless tobacco: Never Used     Tobacco comment: 1/2 pack daily, started age 20   Vaping Use     Vaping Use: Never used   Substance Use Topics     Alcohol use: Yes     Alcohol/week: 0.0 standard drinks     Comment: rare occasion     Drug use: No        Medications:    adalimumab (HUMIRA *CF* PEN) 40 MG/0.4ML pen kit  albuterol (PROAIR HFA/PROVENTIL HFA/VENTOLIN HFA) 108 (90 Base) MCG/ACT Inhaler  albuterol (PROAIR HFA/PROVENTIL HFA/VENTOLIN HFA) 108 (90 BASE) MCG/ACT Inhaler  azaTHIOprine (IMURAN) 50 MG tablet  calcium carbonate (OS- MG [place]. CA) 500 MG tablet  cetirizine (ZYRTEC) 10 MG tablet  colestipol (COLESTID) 1 G tablet  cyanocobalamin (VITAMIN B12) 1000 MCG/ML injection  IBUPROFEN PO  melatonin 3 MG tablet  multivitamin, therapeutic with minerals (MULTI-VITAMIN) TABS tablet  NATURAL VITAMIN D-3 125 MCG (5000 UT) TABS  oxyCODONE (ROXICODONE) 5 MG tablet  Syringe/Needle, Disp, 25G X 5/8\" 3 ML MISC          Review of Systems   Constitutional: Positive for appetite change and chills. Negative for fever.   HENT: Negative.    Eyes: Negative.    Respiratory: Negative.    Cardiovascular: Negative.    Gastrointestinal: Positive for abdominal distention, abdominal pain and nausea. Negative for blood in stool and vomiting.   Genitourinary: Negative.    Musculoskeletal: Positive for back pain (Patient states that " she has chronic back pain associated with some arthritic changes to her back but no new back pain since abdominal pain started.).   Skin: Negative for rash.   Neurological: Negative.    Psychiatric/Behavioral: Positive for sleep disturbance.   All other systems reviewed and are negative.      Physical Exam   BP: (!) 139/93  Pulse: 76  Temp: 97.9  F (36.6  C)  Resp: 16  Weight: 98 kg (216 lb)  SpO2: 99 %      Physical Exam  Vitals and nursing note reviewed.   Constitutional:       General: She is in acute distress.   HENT:      Head: Normocephalic and atraumatic.   Eyes:      General: No scleral icterus.     Extraocular Movements: Extraocular movements intact.      Pupils: Pupils are equal, round, and reactive to light.   Cardiovascular:      Rate and Rhythm: Normal rate and regular rhythm.   Pulmonary:      Effort: Pulmonary effort is normal. No respiratory distress.   Abdominal:      General: Abdomen is protuberant. Bowel sounds are decreased. There is distension.      Palpations: Abdomen is soft.      Tenderness: There is generalized abdominal tenderness. There is no guarding or rebound.   Skin:     Capillary Refill: Capillary refill takes less than 2 seconds.      Findings: No rash.   Neurological:      Mental Status: She is alert and oriented to person, place, and time.   Psychiatric:         Mood and Affect: Mood is anxious.         Behavior: Behavior normal.         ED Course        Procedures              Critical Care time:  none               Results for orders placed or performed during the hospital encounter of 10/27/21 (from the past 24 hour(s))   [place] Draw    Narrative    The following orders were created for panel order [place] Draw.  Procedure                               Abnormality         Status                     ---------                               -----------         ------                     Extra Green Top (Lithium...[[id number]]                      In process                 Extra  Purple Top Tube[[id number]]                            In process                   Please view results for these tests on the individual orders.   CBC with platelets differential    Narrative    The following orders were created for panel order CBC with platelets differential.  Procedure                               Abnormality         Status                     ---------                               -----------         ------                     CBC with platelets and d...[[id number]]  Abnormal            Final result                 Please view results for these tests on the individual orders.   Comprehensive metabolic panel   Result Value Ref Range    Sodium 137 133 - 144 mmol/L    Potassium 3.7 3.4 - 5.3 mmol/L    Chloride 108 94 - 109 mmol/L    Carbon Dioxide (CO2) 23 20 - 32 mmol/L    Anion Gap 6 3 - 14 mmol/L    Urea Nitrogen 5 (L) 7 - 30 mg/dL    Creatinine 0.60 0.52 - 1.04 mg/dL    Calcium 8.0 (L) 8.5 - 10.1 mg/dL    Glucose 115 (H) 70 - 99 mg/dL    Alkaline Phosphatase 75 40 - 150 U/L    AST 26 0 - 45 U/L    ALT 26 0 - 50 U/L    Protein Total 7.4 6.8 - 8.8 g/dL    Albumin 3.3 (L) 3.4 - 5.0 g/dL    Bilirubin Total 0.5 0.2 - 1.3 mg/dL    GFR Estimate >90 >60 mL/min/1.73m2   Lipase   Result Value Ref Range    Lipase 57 (L) 73 - 393 U/L   CBC with platelets and differential   Result Value Ref Range    WBC Count 5.6 4.0 - 11.0 10e3/uL    RBC Count 3.59 (L) 3.80 - 5.20 10e6/uL    Hemoglobin 13.7 11.7 - 15.7 g/dL    Hematocrit 39.5 35.0 - 47.0 %     (H) 78 - 100 fL    MCH 38.2 (H) 26.5 - 33.0 pg    MCHC 34.7 31.5 - 36.5 g/dL    RDW 12.2 10.0 - 15.0 %    Platelet Count 204 150 - 450 10e3/uL    % Neutrophils 44 %    % Lymphocytes 42 %    % Monocytes 11 %    % Eosinophils 2 %    % Basophils 1 %    % Immature Granulocytes 0 %    NRBCs per 100 WBC 0 <1 /100    Absolute Neutrophils 2.4 1.6 - 8.3 10e3/uL    Absolute Lymphocytes 2.3 0.8 - 5.3 10e3/uL    Absolute Monocytes 0.6 0.0 - 1.3 10e3/uL    Absolute  Eosinophils 0.1 0.0 - 0.7 10e3/uL    Absolute Basophils 0.0 0.0 - 0.2 10e3/uL    Absolute Immature Granulocytes 0.0 <=0.0 10e3/uL    Absolute NRBCs 0.0 10e3/uL       Medications   0.9% sodium chloride BOLUS (500 mLs Intravenous New Bag 10/27/21 0647)     Followed by   sodium chloride 0.9% infusion ( Intravenous New Bag 10/27/21 0646)   ondansetron (ZOFRAN) injection 4 mg (4 mg Intravenous Given 10/27/21 0647)   HYDROmorphone (PF) (DILAUDID) injection 0.5 mg (0.5 mg Intravenous Given 10/27/21 0715)       Assessments & Plan (with Medical Decision Making)  51-year-old female to the ER secondary concerns of increasingly severe exacerbation of her chronic abdominal pain associated with her Crohn's disease.  Patient is scheduled for surgery secondary to adhesions in her abdomen this next month in [place], [place].  Patient has been unable to sleep because of the increase in pain.  She is already on a low residual liquid diet as she was directed per her gastroenterologist.  Patient initiated on IV pain meds and antiemetics.  IV fluids initiated as well.  I signed out the patient's care to Dr. Dennison at shift change. I notified the patient that Dr. Dennison would be continuing to care for her and follow-up on the results of the tests. Please review Dr. Dennison's ED note for further details on the care and disposition of Mackenzie Worrell.       I have reviewed the nursing notes.      Final diagnoses:   Abdominal cramping   Nausea   Crohn's disease of small intestine without complication (H)       10/27/2021   [facility] EMERGENCY DEPT     Harish Manley, DO  10/27/21 [id number]

## 2021-10-28 PROBLEM — Z12.4 CERVICAL CANCER SCREENING: Status: ACTIVE | Noted: 2021-10-21

## 2021-10-28 LAB
HUMAN PAPILLOMA VIRUS 16 DNA: NEGATIVE
HUMAN PAPILLOMA VIRUS 18 DNA: NEGATIVE
HUMAN PAPILLOMA VIRUS FINAL DIAGNOSIS: NORMAL
HUMAN PAPILLOMA VIRUS OTHER HR: NEGATIVE

## 2021-11-21 ENCOUNTER — HOSPITAL ENCOUNTER (EMERGENCY)
Facility: CLINIC | Age: 51
Discharge: HOME | End: 2021-11-21
Attending: PHYSICIAN ASSISTANT | Admitting: PHYSICIAN ASSISTANT
Payer: COMMERCIAL

## 2021-11-21 ENCOUNTER — APPOINTMENT (OUTPATIENT)
Dept: CT IMAGING | Facility: CLINIC | Age: 51
End: 2021-11-21
Attending: PHYSICIAN ASSISTANT
Payer: COMMERCIAL

## 2021-11-21 VITALS
HEART RATE: 75 BPM | TEMPERATURE: 98 F | WEIGHT: 208 LBS | HEIGHT: 67 IN | SYSTOLIC BLOOD PRESSURE: 113 MMHG | DIASTOLIC BLOOD PRESSURE: 72 MMHG | RESPIRATION RATE: 16 BRPM | BODY MASS INDEX: 32.65 KG/M2 | OXYGEN SATURATION: 97 %

## 2021-11-21 DIAGNOSIS — G89.18 ACUTE POST-OPERATIVE PAIN: ICD-10-CM

## 2021-11-21 DIAGNOSIS — K52.9 ILEITIS: ICD-10-CM

## 2021-11-21 LAB
ALBUMIN SERPL-MCNC: 2.9 G/DL (ref 3.4–5)
ALP SERPL-CCNC: 70 U/L (ref 40–150)
ALT SERPL W P-5'-P-CCNC: 36 U/L (ref 0–50)
ANION GAP SERPL CALCULATED.3IONS-SCNC: 3 MMOL/L (ref 3–14)
AST SERPL W P-5'-P-CCNC: 27 U/L (ref 0–45)
BASOPHILS # BLD AUTO: 0 10E3/UL (ref 0–0.2)
BASOPHILS NFR BLD AUTO: 1 %
BILIRUB SERPL-MCNC: 0.2 MG/DL (ref 0.2–1.3)
BUN SERPL-MCNC: 8 MG/DL (ref 7–30)
CALCIUM SERPL-MCNC: 7.9 MG/DL (ref 8.5–10.1)
CHLORIDE BLD-SCNC: 111 MMOL/L (ref 94–109)
CO2 SERPL-SCNC: 26 MMOL/L (ref 20–32)
CREAT SERPL-MCNC: 0.63 MG/DL (ref 0.52–1.04)
CRP SERPL-MCNC: 4.3 MG/L (ref 0–8)
EOSINOPHIL # BLD AUTO: 0.2 10E3/UL (ref 0–0.7)
EOSINOPHIL NFR BLD AUTO: 4 %
ERYTHROCYTE [DISTWIDTH] IN BLOOD BY AUTOMATED COUNT: 12.8 % (ref 10–15)
GFR SERPL CREATININE-BSD FRML MDRD: >90 ML/MIN/1.73M2
GLUCOSE BLD-MCNC: 112 MG/DL (ref 70–99)
HCT VFR BLD AUTO: 36 % (ref 35–47)
HGB BLD-MCNC: 12.2 G/DL (ref 11.7–15.7)
HOLD SPECIMEN: NORMAL
IMM GRANULOCYTES # BLD: 0 10E3/UL
IMM GRANULOCYTES NFR BLD: 0 %
LYMPHOCYTES # BLD AUTO: 2.5 10E3/UL (ref 0.8–5.3)
LYMPHOCYTES NFR BLD AUTO: 42 %
MCH RBC QN AUTO: 37.2 PG (ref 26.5–33)
MCHC RBC AUTO-ENTMCNC: 33.9 G/DL (ref 31.5–36.5)
MCV RBC AUTO: 110 FL (ref 78–100)
MONOCYTES # BLD AUTO: 0.5 10E3/UL (ref 0–1.3)
MONOCYTES NFR BLD AUTO: 8 %
NEUTROPHILS # BLD AUTO: 2.7 10E3/UL (ref 1.6–8.3)
NEUTROPHILS NFR BLD AUTO: 45 %
NRBC # BLD AUTO: 0 10E3/UL
NRBC BLD AUTO-RTO: 0 /100
PLATELET # BLD AUTO: 237 10E3/UL (ref 150–450)
POTASSIUM BLD-SCNC: 3.5 MMOL/L (ref 3.4–5.3)
PROT SERPL-MCNC: 6.8 G/DL (ref 6.8–8.8)
RBC # BLD AUTO: 3.28 10E6/UL (ref 3.8–5.2)
SODIUM SERPL-SCNC: 140 MMOL/L (ref 133–144)
WBC # BLD AUTO: 6.1 10E3/UL (ref 4–11)

## 2021-11-21 PROCEDURE — 99285 EMERGENCY DEPT VISIT HI MDM: CPT | Performed by: PHYSICIAN ASSISTANT

## 2021-11-21 PROCEDURE — 86140 C-REACTIVE PROTEIN: CPT | Performed by: PHYSICIAN ASSISTANT

## 2021-11-21 PROCEDURE — 96376 TX/PRO/DX INJ SAME DRUG ADON: CPT | Performed by: PHYSICIAN ASSISTANT

## 2021-11-21 PROCEDURE — 250N000011 HC RX IP 250 OP 636: Performed by: PHYSICIAN ASSISTANT

## 2021-11-21 PROCEDURE — 250N000009 HC RX 250: Performed by: PHYSICIAN ASSISTANT

## 2021-11-21 PROCEDURE — 74177 CT ABD & PELVIS W/CONTRAST: CPT

## 2021-11-21 PROCEDURE — 36415 COLL VENOUS BLD VENIPUNCTURE: CPT | Performed by: PHYSICIAN ASSISTANT

## 2021-11-21 PROCEDURE — 96374 THER/PROPH/DIAG INJ IV PUSH: CPT | Mod: 59 | Performed by: PHYSICIAN ASSISTANT

## 2021-11-21 PROCEDURE — 85025 COMPLETE CBC W/AUTO DIFF WBC: CPT | Performed by: PHYSICIAN ASSISTANT

## 2021-11-21 PROCEDURE — 250N000013 HC RX MED GY IP 250 OP 250 PS 637: Performed by: PHYSICIAN ASSISTANT

## 2021-11-21 PROCEDURE — 80053 COMPREHEN METABOLIC PANEL: CPT | Performed by: PHYSICIAN ASSISTANT

## 2021-11-21 PROCEDURE — 96375 TX/PRO/DX INJ NEW DRUG ADDON: CPT | Performed by: PHYSICIAN ASSISTANT

## 2021-11-21 PROCEDURE — 99285 EMERGENCY DEPT VISIT HI MDM: CPT | Mod: 25 | Performed by: PHYSICIAN ASSISTANT

## 2021-11-21 RX ORDER — CIPROFLOXACIN 500 MG/1
500 TABLET, FILM COATED ORAL ONCE
Status: COMPLETED | OUTPATIENT
Start: 2021-11-21 | End: 2021-11-21

## 2021-11-21 RX ORDER — IOPAMIDOL 755 MG/ML
100 INJECTION, SOLUTION INTRAVASCULAR ONCE
Status: COMPLETED | OUTPATIENT
Start: 2021-11-21 | End: 2021-11-21

## 2021-11-21 RX ORDER — HYDROMORPHONE HYDROCHLORIDE 1 MG/ML
0.5 INJECTION, SOLUTION INTRAMUSCULAR; INTRAVENOUS; SUBCUTANEOUS
Status: DISCONTINUED | OUTPATIENT
Start: 2021-11-21 | End: 2021-11-21 | Stop reason: HOSPADM

## 2021-11-21 RX ORDER — ONDANSETRON 2 MG/ML
4 INJECTION INTRAMUSCULAR; INTRAVENOUS EVERY 30 MIN PRN
Status: DISCONTINUED | OUTPATIENT
Start: 2021-11-21 | End: 2021-11-21 | Stop reason: HOSPADM

## 2021-11-21 RX ORDER — OXYCODONE HYDROCHLORIDE 5 MG/1
5-10 TABLET ORAL EVERY 6 HOURS PRN
Qty: 12 TABLET | Refills: 0 | Status: SHIPPED | OUTPATIENT
Start: 2021-11-21

## 2021-11-21 RX ORDER — CIPROFLOXACIN 500 MG/1
500 TABLET, FILM COATED ORAL 2 TIMES DAILY
Qty: 10 TABLET | Refills: 0 | Status: SHIPPED | OUTPATIENT
Start: 2021-11-21 | End: 2021-11-26

## 2021-11-21 RX ORDER — METRONIDAZOLE 500 MG/1
500 TABLET ORAL 2 TIMES DAILY
Qty: 28 TABLET | Refills: 0 | Status: SHIPPED | OUTPATIENT
Start: 2021-11-21 | End: 2021-12-05

## 2021-11-21 RX ORDER — METRONIDAZOLE 500 MG/1
500 TABLET ORAL ONCE
Status: COMPLETED | OUTPATIENT
Start: 2021-11-21 | End: 2021-11-21

## 2021-11-21 RX ADMIN — SODIUM CHLORIDE 60 ML: 9 INJECTION, SOLUTION INTRAVENOUS at 16:59

## 2021-11-21 RX ADMIN — IOPAMIDOL 100 ML: 755 INJECTION, SOLUTION INTRAVENOUS at 16:58

## 2021-11-21 RX ADMIN — METRONIDAZOLE 500 MG: 500 TABLET ORAL at 19:00

## 2021-11-21 RX ADMIN — HYDROMORPHONE HYDROCHLORIDE 1 MG: 1 INJECTION, SOLUTION INTRAMUSCULAR; INTRAVENOUS; SUBCUTANEOUS at 16:40

## 2021-11-21 RX ADMIN — CIPROFLOXACIN HYDROCHLORIDE 500 MG: 500 TABLET, FILM COATED ORAL at 18:59

## 2021-11-21 RX ADMIN — ONDANSETRON 4 MG: 2 INJECTION INTRAMUSCULAR; INTRAVENOUS at 18:01

## 2021-11-21 RX ADMIN — HYDROMORPHONE HYDROCHLORIDE 0.5 MG: 1 INJECTION, SOLUTION INTRAMUSCULAR; INTRAVENOUS; SUBCUTANEOUS at 18:03

## 2021-11-21 RX ADMIN — ONDANSETRON 4 MG: 2 INJECTION INTRAMUSCULAR; INTRAVENOUS at 16:37

## 2021-11-21 ASSESSMENT — MIFFLIN-ST. JEOR: SCORE: 1596.11

## 2021-11-21 NOTE — ED PROVIDER NOTES
History     Chief Complaint   Patient presents with     Post-op Problem       HPI  Mackenzie Worrell is a 51 year old female who presents to the emergency department for post-operative pain. The patient had a laparoscopic abdominal surgery on 11/15 for lysis of adhesions, and had an ileocolic resection.  She has a history of Crohn's disease.  She reports that yesterday morning she developed pain in her right lower abdomen.  She actually felt nauseated as well and threw up once.  She took some oxycodone which alleviated some of the symptoms but they came back last night and the pain has been constant since then.  Prior to yesterday she had been recovering from surgery well with no significant pain issues.  She had been taking ibuprofen for pain up until yesterday when she needed oxycodone.  She has had watery stools since her surgery a week ago and they have not changed in frequency in the last 24 hours.  There is no reported blood in the stools.  She has not had any fevers and has been urinating normally.  She has been holding her Imuran and Humira post surgery.      Allergies:  Allergies   Allergen Reactions     Dilaudid [Hydromorphone] Nausea and Vomiting     No Known Drug Allergies        Problem List:    Patient Active Problem List    Diagnosis Date Noted     Cervical cancer screening 10/21/2021     Priority: Medium     10/21/21 NIL pap, neg HPV. Endometrial biospy WNL. 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 06/13/2017     Priority: Medium     Morbid obesity due to excess calories (H) 05/21/2017     Priority: Medium     Adalimumab (Humira) long-term use 05/18/2016     Priority: Medium     S/P colectomy 03/09/2016     Priority: Medium     Crohn's disease (H) 02/06/2014     Priority: Medium     Stress  fracture of ankle 12/19/2013     Priority: Medium     Effusion of ankle 11/19/2013     Priority: Medium     CARDIOVASCULAR SCREENING; LDL GOAL LESS THAN 160 10/31/2010     Priority: Medium     Inflammatory arthritis 09/02/2008     Priority: Medium     Regional enteritis of small intestine with large intestine (H)      Priority: Medium     Mild intermittent asthma      Priority: Medium     Contact dermatitis and other eczema, due to unspecified cause      Priority: Medium        Past Medical History:    Past Medical History:   Diagnosis Date     Cervical cancer screening      Contact dermatitis and other eczema, due to unspecified cause      Mild intermittent asthma      Osteoarthrosis, unspecified whether generalized or localized, unspecified site      Regional enteritis of small intestine with large intestine (H)      Synovitis and tenosynovitis, unspecified      Tobacco use disorder        Past Surgical History:    Past Surgical History:   Procedure Laterality Date     C RESECT SMALL INTEST,SINGL RESEC/ANAS      Resection, Partial Small Bowel     COLONOSCOPY N/A 4/9/2018    Procedure: COMBINED COLONOSCOPY, SINGLE OR MULTIPLE BIOPSY/POLYPECTOMY BY BIOPSY;  Colonoscopy with biopsy by biopsy forceps, with polypectomy by snare;  Surgeon: Marc Robbins MD;  Location: [facility]     EXCISE MASS EAR EXTERNAL Right 5/23/2016    Procedure: EXCISE MASS EAR EXTERNAL;  Surgeon: Asif Akhtar MD;  Location: [facility]     HC LAPAROSCOPY, SURGICAL; CHOLECYSTECTOMY      Cholecystectomy, Laparoscopic     HC REMOVAL OF TONSILS,<11 Y/O      Tonsils <13y.o.     HERNIA REPAIR, INCISIONAL  08/13/08    Incarcerated ventral incisional hernia.     LAPAROSCOPIC RESECTION SMALL BOWEL N/A 3/9/2016    Procedure: LAPAROSCOPIC RESECTION SMALL BOWEL;  Surgeon: Mark Farncisco MD;  Location: [facility]     REMOVE FOREIGN BODY HAND Right 5/31/2017    Procedure: REMOVE FOREIGN BODY HAND;  exploration and excision/removal foreign body  "palmar days right little finger;  Surgeon: Benjamin Vital MD;  Location:  OR     [facility] NONSPECIFIC PROCEDURE      laparoscopy for infertility workup/ adhesions     [facility] COLONOSCOPY W BIOPSY  05/18/10       Family History:    Family History   Problem Relation Age of Onset     Diabetes Father         Type II     EYE* Father         Cataract Surgery, Glaucoma     Diabetes Paternal Grandmother         Type II     Anesthesia Reaction No family hx of        Social History:  Marital Status:   [2]  Social History     Tobacco Use     Smoking status: Former Smoker     Packs/day: 0.75     Years: 25.00     Pack years: 18.75     Types: Cigarettes     Smokeless tobacco: Never Used     Tobacco comment: 1/2 pack daily, started age 20   Vaping Use     Vaping Use: Never used   Substance Use Topics     Alcohol use: Yes     Alcohol/week: 0.0 standard drinks     Comment: rare occasion     Drug use: No        Medications:    ciprofloxacin (CIPRO) 500 MG tablet  metroNIDAZOLE (FLAGYL) 500 MG tablet  oxyCODONE (ROXICODONE) 5 MG tablet  adalimumab (HUMIRA *CF* PEN) 40 MG/0.4ML pen kit  albuterol (PROAIR HFA/PROVENTIL HFA/VENTOLIN HFA) 108 (90 Base) MCG/ACT Inhaler  albuterol (PROAIR HFA/PROVENTIL HFA/VENTOLIN HFA) 108 (90 BASE) MCG/ACT Inhaler  azaTHIOprine (IMURAN) 50 MG tablet  calcium carbonate (OS- MG [place]. [place]) 500 MG tablet  cetirizine (ZYRTEC) 10 MG tablet  colestipol (COLESTID) 1 G tablet  cyanocobalamin (VITAMIN B12) 1000 MCG/ML injection  IBUPROFEN PO  melatonin 3 MG tablet  multivitamin, therapeutic with minerals (MULTI-VITAMIN) TABS tablet  NATURAL VITAMIN D-3 125 MCG (5000 UT) TABS  oxyCODONE (ROXICODONE) 5 MG tablet  Syringe/Needle, Disp, 25G X 5/8\" 3 ML MISC          Review of Systems   All other systems reviewed and are negative.      Physical Exam   BP: (!) 147/91  Pulse: 73  Temp: 98  F (36.7  C)  Resp: 16  Height: 170.2 cm (5' 7\")  Weight: 94.3 kg (208 lb)  SpO2: 97 %      Physical Exam  Vitals and " nursing note reviewed.   Constitutional:       General: She is not in acute distress.     Appearance: Normal appearance. She is not ill-appearing, toxic-appearing or diaphoretic.   HENT:      Head: Normocephalic and atraumatic.      Nose: Nose normal.   Eyes:      Extraocular Movements: Extraocular movements intact.      Conjunctiva/sclera: Conjunctivae normal.   Cardiovascular:      Rate and Rhythm: Normal rate and regular rhythm.      Heart sounds: Normal heart sounds.   Pulmonary:      Effort: Pulmonary effort is normal. No respiratory distress.      Breath sounds: Normal breath sounds.   Abdominal:      General: Bowel sounds are normal. There is no distension.      Palpations: Abdomen is soft.      Tenderness: There is abdominal tenderness (generalized). There is no right CVA tenderness, left CVA tenderness, guarding or rebound.      Comments: Multiple surgical incision sites from laparoscopic procedure are intact, no signs of dehiscence.  No surrounding erythema or warmth.  No drainage.   Musculoskeletal:         General: No deformity.      Cervical back: Neck supple.   Skin:     General: Skin is warm and dry.   Neurological:      Mental Status: She is alert and oriented to person, place, and time. Mental status is at baseline.   Psychiatric:         Mood and Affect: Mood normal.         Behavior: Behavior normal.         ED Course        Procedures        Results for orders placed or performed during the hospital encounter of 11/21/21 (from the past 24 hour(s))   [place] Draw    Narrative    The following orders were created for panel order [place] Draw.  Procedure                               Abnormality         Status                     ---------                               -----------         ------                     Extra Green Top (Lithium...[[id number]]                      Final result               Extra Purple Top Tube[[id number]]                            Final result               Extra Green  Top (Lithium...[[id number]]                      Final result                 Please view results for these tests on the individual orders.   Extra Green Top (Lithium Heparin) Tube   Result Value Ref Range    Hold Specimen JIC    Extra Purple Top Tube   Result Value Ref Range    Hold Specimen JIC    Extra Green Top (Lithium Heparin) ON ICE   Result Value Ref Range    Hold Specimen JIC    CBC with platelets differential    Narrative    The following orders were created for panel order CBC with platelets differential.  Procedure                               Abnormality         Status                     ---------                               -----------         ------                     CBC with platelets and d...[[id number]]  Abnormal            Final result                 Please view results for these tests on the individual orders.   Comprehensive metabolic panel   Result Value Ref Range    Sodium 140 133 - 144 mmol/L    Potassium 3.5 3.4 - 5.3 mmol/L    Chloride 111 (H) 94 - 109 mmol/L    Carbon Dioxide (CO2) 26 20 - 32 mmol/L    Anion Gap 3 3 - 14 mmol/L    Urea Nitrogen 8 7 - 30 mg/dL    Creatinine 0.63 0.52 - 1.04 mg/dL    Calcium 7.9 (L) 8.5 - 10.1 mg/dL    Glucose 112 (H) 70 - 99 mg/dL    Alkaline Phosphatase 70 40 - 150 U/L    AST 27 0 - 45 U/L    ALT 36 0 - 50 U/L    Protein Total 6.8 6.8 - 8.8 g/dL    Albumin 2.9 (L) 3.4 - 5.0 g/dL    Bilirubin Total 0.2 0.2 - 1.3 mg/dL    GFR Estimate >90 >60 mL/min/1.73m2   CRP inflammation   Result Value Ref Range    CRP Inflammation 4.3 0.0 - 8.0 mg/L   CBC with platelets and differential   Result Value Ref Range    WBC Count 6.1 4.0 - 11.0 10e3/uL    RBC Count 3.28 (L) 3.80 - 5.20 10e6/uL    Hemoglobin 12.2 11.7 - 15.7 g/dL    Hematocrit 36.0 35.0 - 47.0 %     (H) 78 - 100 fL    MCH 37.2 (H) 26.5 - 33.0 pg    MCHC 33.9 31.5 - 36.5 g/dL    RDW 12.8 10.0 - 15.0 %    Platelet Count 237 150 - 450 10e3/uL    % Neutrophils 45 %    % Lymphocytes 42 %    % Monocytes  8 %    % Eosinophils 4 %    % Basophils 1 %    % Immature Granulocytes 0 %    NRBCs per 100 WBC 0 <1 /100    Absolute Neutrophils 2.7 1.6 - 8.3 10e3/uL    Absolute Lymphocytes 2.5 0.8 - 5.3 10e3/uL    Absolute Monocytes 0.5 0.0 - 1.3 10e3/uL    Absolute Eosinophils 0.2 0.0 - 0.7 10e3/uL    Absolute Basophils 0.0 0.0 - 0.2 10e3/uL    Absolute Immature Granulocytes 0.0 <=0.0 10e3/uL    Absolute NRBCs 0.0 10e3/uL   CT Abdomen Pelvis w Contrast    Narrative    EXAM: CT ABDOMEN PELVIS W CONTRAST  LOCATION: [facility]  DATE/TIME: 11/21/2021 4:49 PM    INDICATION: Right lower quadrant pain.  COMPARISON: 09/23/2016.  TECHNIQUE: CT scan of the abdomen and pelvis was performed following injection of IV contrast. Multiplanar reformats were obtained. Dose reduction techniques were used.  CONTRAST: 100 mL Isovue-370    FINDINGS:   LOWER CHEST: Normal.    HEPATOBILIARY: Fatty liver. Cholecystectomy.    PANCREAS: Stable, thick-walled, 2.4 cm cystic mass within the pancreatic body, likely pancreatic cystic neoplasm.    SPLEEN: Normal.    ADRENAL GLANDS: Normal.    KIDNEYS/BLADDER: Normal.    BOWEL: Ileocolonic anastomosis within the right upper abdomen. There is significant wall thickening involving multiple loops of ileum proximal to the anastomosis and extending to the anastomosis. There is also mild wall thickening of the colon at the   anastomosis. Small bowel mesenteric congestion. Findings compatible with ileitis.    LYMPH NODES: Normal.    VASCULATURE: Unremarkable.    PELVIC ORGANS: Small to moderate low-attenuation free fluid in the pelvis.    MUSCULOSKELETAL: Postsurgical changes ventral abdominal wall.      Impression    IMPRESSION:   1.  Ileocolonic anastomosis within the right upper abdomen. There is moderate low-attenuation wall thickening involving multiple ileal loops proximal to the anastomosis compatible with ileitis with mesenteric congestion. There is also colonic wall    thickening at the anastomosis. Small to moderate amount of low-attenuation free fluid in the pelvis.    2.  Cholecystectomy.    3.  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    REFERENCE:  International Consensus Guidelines for Management of IPMN and MCN of the Pancreas. Pancreatology 12 (2012) 183-197.    Asymptomatic patient without high-risk stigmata of malignancy (obstructive jaundice with cystic lesion in head of pancreas, enhancing solid component within cyst, or main pancreatic duct greater than 10 mm), and without worrisome features (cyst greater   than 3 cm, thickened/enhancing cyst walls, main pancreatic duct 5-9 mm, mural nodule, or abrupt change in caliber of pancreatic duct with distal pancreatic atrophy).    Size of largest cyst:  Less than 1 cm: CT or MRI with contrast in 2-3 years.    1-2 cm: CT or MRI with contrast yearly for 2 years, then lengthen interval if no change.    2-3 cm: EUS in 3-6 months, then can alternate MRI with EUS as appropriate.    Greater than 3 cm: Close surveillance alternating MRI with EUS every 3-6 months. Consider surgery in young, fit patients.    Consider Gastroenterology consultation for all categories of pancreatic cysts.    Findings code: PC1         Medications   ondansetron (ZOFRAN) injection 4 mg (4 mg Intravenous Given 11/21/21 1801)   HYDROmorphone (PF) (DILAUDID) injection 0.5 mg (0.5 mg Intravenous Given 11/21/21 1803)   ciprofloxacin (CIPRO) tablet 500 mg (has no administration in time range)   metroNIDAZOLE (FLAGYL) tablet 500 mg (has no administration in time range)   HYDROmorphone (DILAUDID) injection 1 mg (1 mg Intravenous Given 11/21/21 1640)   iopamidol (ISOVUE-370) solution 100 mL (100 mLs Intravenous Given 11/21/21 1658)   sodium chloride 0.9 % bag 500mL for CT scan flush use (60 mLs As instructed Given 11/21/21 1659)       Assessments & Plan (with Medical Decision Making)  Mackenzie Worrell  is a 51 year old female who presented to the ED complaining of increased right lower abdominal pain.  She has a history of Crohn's disease in 1 week ago had a ileocolonic resection with lysis of adhesions at the [facility].  She has had one episode of nausea and vomiting with this pain but otherwise no other symptoms.  On arrival to the ED her vital signs were unremarkable, she was afebrile.  Exam demonstrated generalized tenderness throughout the abdomen, more prominent in the right lower quadrant but no rebound or guarding.  IV was established and labs were drawn for further evaluation.  She was given Dilaudid here for pain control as well as Zofran.  Lab studies were reassuring, her hemoglobin was 12.2, white count 6.1.  No acute electrolyte abnormalities.  CRP 4.3.  CT scan of the abdomen/pelvis showed some wall thickening involving multiple ileal loops proximal to the anastomosis compatible with ileitis with mesenteric congestion along with some colonic wall thickening at the anastomosis.  I called and spoke with on-call GI provider Dr. Arias at [facility] who reviewed patient's case.  He felt that the inflammation seen on the CT scan was more likely postsurgical than a flare of her Crohn's disease.  He recommended pain control and initiating patient on both Cipro and Flagyl.  Preferably would like to hold on steroids due to recent surgical status.  Patient was comfortable with this plan.  Cipro and Flagyl were prescribed per GI recommendations and I refilled her oxycodone as well, side effects discussed.  She was advised to contact her GI team first thing in the morning to schedule close follow-up.  She was also instructed to return to the ED if she develops any worsening symptoms.  All questions were answered and patient was discharged home in suitable condition.     I have reviewed the nursing notes.    I have reviewed the findings, diagnosis, plan and need for follow up with the patient.    New  Prescriptions    CIPROFLOXACIN (CIPRO) 500 MG TABLET    Take 1 tablet (500 mg) by mouth 2 times daily for 5 days    METRONIDAZOLE (FLAGYL) 500 MG TABLET    Take 1 tablet (500 mg) by mouth 2 times daily for 14 days    OXYCODONE (ROXICODONE) 5 MG TABLET    Take 1-2 tablets (5-10 mg) by mouth every 6 hours as needed for pain       Final diagnoses:   Acute post-operative pain   Ileitis     Note: Chart documentation done in part with Dragon Voice Recognition software. Although reviewed after completion, some word and grammatical errors may remain.      11/21/2021   [facility] EMERGENCY DEPT     Elana Castro PA-C  11/21/21 [id number]
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
## 2021-11-21 NOTE — ED TRIAGE NOTES
S: post op problems  B: Pt had an intestinal resection on Monday 11/15. Pt is experiencing severe pain even with pain medication. Reports vomiting and watery stools.  A: Monitoring  R: Ready for provider evaluation.

## 2021-11-22 ENCOUNTER — TRANSFERRED RECORDS (OUTPATIENT)
Dept: HEALTH INFORMATION MANAGEMENT | Facility: CLINIC | Age: 51
End: 2021-11-22
Payer: COMMERCIAL

## 2021-11-22 NOTE — DISCHARGE INSTRUCTIONS
Please  your prescriptions in the morning and take as directed.  Use the oxycodone as needed for pain.  The ciprofloxacin and metronidazole are both antibiotics that the GI provider recommended you start to help with the ileitis seen on CT scan.  Please contact your GI provider in the morning to schedule close follow-up.  If you have any worsening concerns please return to the emergency department.    Thank you for choosing [facility]'s Emergency Department. It was a pleasure taking care of you today. If you have any questions, please call [phone].    Elana Castro PA-C

## 2021-12-10 ENCOUNTER — OFFICE VISIT (OUTPATIENT)
Dept: INTERNAL MEDICINE | Facility: CLINIC | Age: 51
End: 2021-12-10
Payer: COMMERCIAL

## 2021-12-10 VITALS
SYSTOLIC BLOOD PRESSURE: 132 MMHG | OXYGEN SATURATION: 98 % | DIASTOLIC BLOOD PRESSURE: 80 MMHG | HEART RATE: 100 BPM | BODY MASS INDEX: 33.25 KG/M2 | RESPIRATION RATE: 18 BRPM | WEIGHT: 212.3 LBS | TEMPERATURE: 97.6 F

## 2021-12-10 DIAGNOSIS — T81.49XA INCISIONAL INFECTION: Primary | ICD-10-CM

## 2021-12-10 PROCEDURE — 99213 OFFICE O/P EST LOW 20 MIN: CPT | Performed by: INTERNAL MEDICINE

## 2021-12-10 RX ORDER — CEPHALEXIN 500 MG/1
500 CAPSULE ORAL 3 TIMES DAILY
Qty: 21 CAPSULE | Refills: 0 | Status: SHIPPED | OUTPATIENT
Start: 2021-12-10

## 2021-12-10 ASSESSMENT — PAIN SCALES - GENERAL: PAINLEVEL: NO PAIN (0)
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
## 2021-12-11 ASSESSMENT — ASTHMA QUESTIONNAIRES: ACT_TOTALSCORE: 25

## 2022-06-28 ENCOUNTER — MYC MEDICAL ADVICE (OUTPATIENT)
Dept: INTERNAL MEDICINE | Facility: CLINIC | Age: 52
End: 2022-06-28

## 2022-07-19 ENCOUNTER — HOSPITAL ENCOUNTER (EMERGENCY)
Facility: CLINIC | Age: 52
Discharge: HOME | End: 2022-07-19
Attending: EMERGENCY MEDICINE | Admitting: EMERGENCY MEDICINE
Payer: COMMERCIAL

## 2022-07-19 VITALS
TEMPERATURE: 98.3 F | RESPIRATION RATE: 18 BRPM | HEART RATE: 90 BPM | OXYGEN SATURATION: 98 % | SYSTOLIC BLOOD PRESSURE: 148 MMHG | DIASTOLIC BLOOD PRESSURE: 96 MMHG

## 2022-07-19 DIAGNOSIS — R21 RASH AND NONSPECIFIC SKIN ERUPTION: ICD-10-CM

## 2022-07-19 LAB
ALBUMIN SERPL-MCNC: 3.3 G/DL (ref 3.4–5)
ALBUMIN UR-MCNC: NEGATIVE MG/DL
ALP SERPL-CCNC: 103 U/L (ref 40–150)
ALT SERPL W P-5'-P-CCNC: 29 U/L (ref 0–50)
ANION GAP SERPL CALCULATED.3IONS-SCNC: 4 MMOL/L (ref 3–14)
APPEARANCE UR: CLEAR
AST SERPL W P-5'-P-CCNC: 19 U/L (ref 0–45)
BASOPHILS # BLD MANUAL: 0 10E3/UL (ref 0–0.2)
BASOPHILS NFR BLD MANUAL: 0 %
BILIRUB SERPL-MCNC: 0.4 MG/DL (ref 0.2–1.3)
BILIRUB UR QL STRIP: NEGATIVE
BUN SERPL-MCNC: 9 MG/DL (ref 7–30)
CALCIUM SERPL-MCNC: 8.4 MG/DL (ref 8.5–10.1)
CHLORIDE BLD-SCNC: 107 MMOL/L (ref 94–109)
CO2 SERPL-SCNC: 29 MMOL/L (ref 20–32)
COLOR UR AUTO: YELLOW
CREAT SERPL-MCNC: 0.78 MG/DL (ref 0.52–1.04)
CRP SERPL-MCNC: 2.9 MG/L (ref 0–8)
EOSINOPHIL # BLD MANUAL: 0.1 10E3/UL (ref 0–0.7)
EOSINOPHIL NFR BLD MANUAL: 2 %
ERYTHROCYTE [DISTWIDTH] IN BLOOD BY AUTOMATED COUNT: 13.2 % (ref 10–15)
ERYTHROCYTE [SEDIMENTATION RATE] IN BLOOD BY WESTERGREN METHOD: 29 MM/HR (ref 0–30)
GFR SERPL CREATININE-BSD FRML MDRD: >90 ML/MIN/1.73M2
GLUCOSE BLD-MCNC: 106 MG/DL (ref 70–99)
GLUCOSE UR STRIP-MCNC: NEGATIVE MG/DL
HCT VFR BLD AUTO: 34.9 % (ref 35–47)
HGB BLD-MCNC: 11.5 G/DL (ref 11.7–15.7)
HGB UR QL STRIP: NEGATIVE
KETONES UR STRIP-MCNC: NEGATIVE MG/DL
LEUKOCYTE ESTERASE UR QL STRIP: NEGATIVE
LYMPHOCYTES # BLD MANUAL: 2.7 10E3/UL (ref 0.8–5.3)
LYMPHOCYTES NFR BLD MANUAL: 54 %
MCH RBC QN AUTO: 34 PG (ref 26.5–33)
MCHC RBC AUTO-ENTMCNC: 33 G/DL (ref 31.5–36.5)
MCV RBC AUTO: 103 FL (ref 78–100)
MONOCYTES # BLD MANUAL: 0.1 10E3/UL (ref 0–1.3)
MONOCYTES NFR BLD MANUAL: 1 %
MUCOUS THREADS #/AREA URNS LPF: PRESENT /LPF
NEUTROPHILS # BLD MANUAL: 2.2 10E3/UL (ref 1.6–8.3)
NEUTROPHILS NFR BLD MANUAL: 43 %
NITRATE UR QL: NEGATIVE
PH UR STRIP: 6 [PH] (ref 5–7)
PLAT MORPH BLD: NORMAL
PLATELET # BLD AUTO: 145 10E3/UL (ref 150–450)
POTASSIUM BLD-SCNC: 3.6 MMOL/L (ref 3.4–5.3)
PROT SERPL-MCNC: 7.6 G/DL (ref 6.8–8.8)
RBC # BLD AUTO: 3.38 10E6/UL (ref 3.8–5.2)
RBC MORPH BLD: NORMAL
RBC URINE: <1 /HPF
SODIUM SERPL-SCNC: 140 MMOL/L (ref 133–144)
SP GR UR STRIP: 1.01 (ref 1–1.03)
SQUAMOUS EPITHELIAL: 2 /HPF
UROBILINOGEN UR STRIP-MCNC: NORMAL MG/DL
WBC # BLD AUTO: 5 10E3/UL (ref 4–11)
WBC URINE: 3 /HPF

## 2022-07-19 PROCEDURE — 85027 COMPLETE CBC AUTOMATED: CPT | Performed by: EMERGENCY MEDICINE

## 2022-07-19 PROCEDURE — 86140 C-REACTIVE PROTEIN: CPT | Performed by: EMERGENCY MEDICINE

## 2022-07-19 PROCEDURE — 80053 COMPREHEN METABOLIC PANEL: CPT | Performed by: EMERGENCY MEDICINE

## 2022-07-19 PROCEDURE — 82040 ASSAY OF SERUM ALBUMIN: CPT | Performed by: EMERGENCY MEDICINE

## 2022-07-19 PROCEDURE — 36415 COLL VENOUS BLD VENIPUNCTURE: CPT | Performed by: EMERGENCY MEDICINE

## 2022-07-19 PROCEDURE — 81001 URINALYSIS AUTO W/SCOPE: CPT | Performed by: EMERGENCY MEDICINE

## 2022-07-19 PROCEDURE — 85652 RBC SED RATE AUTOMATED: CPT | Performed by: EMERGENCY MEDICINE

## 2022-07-19 PROCEDURE — 85007 BL SMEAR W/DIFF WBC COUNT: CPT | Performed by: EMERGENCY MEDICINE

## 2022-07-19 PROCEDURE — 99284 EMERGENCY DEPT VISIT MOD MDM: CPT | Performed by: EMERGENCY MEDICINE

## 2022-07-19 PROCEDURE — 99283 EMERGENCY DEPT VISIT LOW MDM: CPT | Performed by: EMERGENCY MEDICINE

## 2022-07-19 RX ORDER — TRIAMCINOLONE ACETONIDE 1 MG/G
OINTMENT TOPICAL 2 TIMES DAILY
Qty: 30 G | Refills: 0 | Status: SHIPPED | OUTPATIENT
Start: 2022-07-19 | End: 2022-08-02

## 2022-07-19 NOTE — ED PROVIDER NOTES
History     Chief Complaint   Patient presents with     Rash     HPI  Mackenzie Worrell is a 52 year old female with history of dermatographism, crohns disease on Humira, inflammatory arthritis, contact dermatitis, asthma who presents for evaluation of rash on her legs.  Patient reports that she developed a rash on her medial lower legs bilaterally yesterday.  She states she noticed it after work and that the rash stopped at her sock line.  She states it is slightly itching and slightly sore but not significantly painful.  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  She does not know of any specific exposures.  She denies any new soaps, detergents, lotions, or other new exposures.  Denies any burn or sunburn to the area.  States she did shave her legs over the area last night and potentially got slightly more erythematous after that.  Has had allergic reactions with hives in the past.  She denies any other areas of rash.  Denies any recent tick exposure.  States that a couple of weeks ago they did go camping and she brushed up against some grass and had some rash in that same general area that resolved quickly on its own.  Denies fever, chest pain, shortness of breath, leg swelling, nausea, vomiting, diarrhea, dysuria, hematuria, abdominal pain, or other complaints. No known trauma to the area.     Allergies:  Allergies   Allergen Reactions     Tramadol Hives     Dilaudid [Hydromorphone] Nausea and Vomiting     No Known Drug Allergies        Problem List:    Patient Active Problem List    Diagnosis Date Noted     Cervical cancer screening 10/21/2021     Priority: Medium     10/21/21 NIL pap, neg HPV. Endometrial biospy WNL. 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 06/13/2017      Priority: Medium     Morbid obesity due to excess calories (H) 05/21/2017     Priority: Medium     Adalimumab (Humira) long-term use 05/18/2016     Priority: Medium     S/P colectomy 03/09/2016     Priority: Medium     Crohn's disease (H) 02/06/2014     Priority: Medium     Stress fracture of ankle 12/19/2013     Priority: Medium     Effusion of ankle 11/19/2013     Priority: Medium     CARDIOVASCULAR SCREENING; LDL GOAL LESS THAN 160 10/31/2010     Priority: Medium     Inflammatory arthritis 09/02/2008     Priority: Medium     Regional enteritis of small intestine with large intestine (H)      Priority: Medium     Mild intermittent asthma      Priority: Medium     Contact dermatitis and other eczema, due to unspecified cause      Priority: Medium        Past Medical History:    Past Medical History:   Diagnosis Date     Cervical cancer screening      Contact dermatitis and other eczema, due to unspecified cause      Mild intermittent asthma      Osteoarthrosis, unspecified whether generalized or localized, unspecified site      Regional enteritis of small intestine with large intestine (H)      Synovitis and tenosynovitis, unspecified      Tobacco use disorder        Past Surgical History:    Past Surgical History:   Procedure Laterality Date     COLONOSCOPY N/A 4/9/2018    Procedure: COMBINED COLONOSCOPY, SINGLE OR MULTIPLE BIOPSY/POLYPECTOMY BY BIOPSY;  Colonoscopy with biopsy by biopsy forceps, with polypectomy by snare;  Surgeon: Marc Robbins MD;  Location: [facility]     EXCISE MASS EAR EXTERNAL Right 5/23/2016    Procedure: EXCISE MASS EAR EXTERNAL;  Surgeon: Asif Akhtar MD;  Location:  OR     HC LAPAROSCOPY, SURGICAL; CHOLECYSTECTOMY      Cholecystectomy, Laparoscopic     HC REMOVAL OF TONSILS,<11 Y/O      Tonsils <13y.o.     HERNIA REPAIR, INCISIONAL  08/13/08    Incarcerated ventral incisional hernia.     LAPAROSCOPIC RESECTION SMALL BOWEL N/A 3/9/2016    Procedure: LAPAROSCOPIC  "RESECTION SMALL BOWEL;  Surgeon: Mark Francisco MD;  Location: [facility]     REMOVE FOREIGN BODY HAND Right 5/31/2017    Procedure: REMOVE FOREIGN BODY HAND;  exploration and excision/removal foreign body palmar days right little finger;  Surgeon: eBnjamin Vital MD;  Location: [facility]     ZZC NONSPECIFIC PROCEDURE      laparoscopy for infertility workup/ adhesions     ZZC RESECT SMALL INTEST,SINGL RESEC/ANAS      Resection, Partial Small Bowel     ZZHC COLONOSCOPY W BIOPSY  05/18/10       Family History:    Family History   Problem Relation Age of Onset     Diabetes Father         Type II     EYE* Father         Cataract Surgery, Glaucoma     Diabetes Paternal Grandmother         Type II     Anesthesia Reaction No family hx of        Social History:  Marital Status:   [2]  Social History     Tobacco Use     Smoking status: Former Smoker     Packs/day: 0.75     Years: 25.00     Pack years: 18.75     Types: Cigarettes     Smokeless tobacco: Never Used     Tobacco comment: 1/2 pack daily, started age 20   Vaping Use     Vaping Use: Never used   Substance Use Topics     Alcohol use: Yes     Alcohol/week: 0.0 standard drinks     Comment: rare occasion     Drug use: No        Medications:    adalimumab (HUMIRA *CF* PEN) 40 MG/0.4ML pen kit  albuterol (PROAIR HFA/PROVENTIL HFA/VENTOLIN HFA) 108 (90 Base) MCG/ACT Inhaler  albuterol (PROAIR HFA/PROVENTIL HFA/VENTOLIN HFA) 108 (90 BASE) MCG/ACT Inhaler  azaTHIOprine (IMURAN) 50 MG tablet  calcium carbonate (OS- MG [place]. CA) 500 MG tablet  cephALEXin (KEFLEX) 500 MG capsule  cetirizine (ZYRTEC) 10 MG tablet  colestipol (COLESTID) 1 G tablet  cyanocobalamin (VITAMIN B12) 1000 MCG/ML injection  IBUPROFEN PO  melatonin 3 MG tablet  multivitamin, therapeutic with minerals (MULTI-VITAMIN) TABS tablet  NATURAL VITAMIN D-3 125 MCG (5000 UT) TABS  oxyCODONE (ROXICODONE) 5 MG tablet  oxyCODONE (ROXICODONE) 5 MG tablet  Syringe/Needle, Disp, 25G X 5/8\" 3 ML " MISC          Review of Systems  Pertinent positives and negatives are documented in the HPI. All other systems reviewed and are negative.    Physical Exam   BP: (!) 168/108  Pulse: 90  Temp: 98.3  F (36.8  C)  Resp: 18  SpO2: 97 %      Physical Exam  Vitals reviewed.   Constitutional:       General: She is not in acute distress.     Appearance: She is well-developed.   HENT:      Head: Normocephalic and atraumatic.      Mouth/Throat:      Mouth: Mucous membranes are moist.      Pharynx: No oropharyngeal exudate or posterior oropharyngeal erythema.      Comments: No intraoral lesions  Eyes:      Extraocular Movements: Extraocular movements intact.      Conjunctiva/sclera: Conjunctivae normal.      Pupils: Pupils are equal, round, and reactive to light.   Cardiovascular:      Rate and Rhythm: Normal rate and regular rhythm.      Pulses: Normal pulses.      Heart sounds: Normal heart sounds. No murmur heard.  Pulmonary:      Effort: Pulmonary effort is normal. No respiratory distress.      Breath sounds: Normal breath sounds. No stridor. No wheezing or rales.   Abdominal:      General: Bowel sounds are normal. There is no distension.      Palpations: Abdomen is soft. There is no mass.      Tenderness: There is no abdominal tenderness. There is no guarding or rebound.   Musculoskeletal:         General: No swelling or tenderness. Normal range of motion.      Cervical back: Normal range of motion and neck supple. No rigidity.   Skin:     General: Skin is warm and dry.      Capillary Refill: Capillary refill takes less than 2 seconds.      Findings: Rash present.      Comments: Rash on the medial legs as pictured below.  This is not raised.  No warmth on palpation.  No bullae or vesicles.  No purpura or petechiae.  The rash is blanchable.  No involvement on the palms or soles or mucous membranes.  No crepitus. No tenderness over the area. No swelling.      Neurological:      General: No focal deficit present.      Mental  Status: She is alert and oriented to person, place, and time.      GCS: GCS eye subscore is 4. GCS verbal subscore is 5. GCS motor subscore is 6.      Cranial Nerves: No cranial nerve deficit.      Sensory: No sensory deficit.      Motor: No weakness.   Psychiatric:         Mood and Affect: Mood normal.                             ED Course                 Procedures                  Results for orders placed or performed during the hospital encounter of 07/19/22 (from the past 24 hour(s))   Comprehensive metabolic panel   Result Value Ref Range    Sodium 140 133 - 144 mmol/L    Potassium 3.6 3.4 - 5.3 mmol/L    Chloride 107 94 - 109 mmol/L    Carbon Dioxide (CO2) 29 20 - 32 mmol/L    Anion Gap 4 3 - 14 mmol/L    Urea Nitrogen 9 7 - 30 mg/dL    Creatinine 0.78 0.52 - 1.04 mg/dL    Calcium 8.4 (L) 8.5 - 10.1 mg/dL    Glucose 106 (H) 70 - 99 mg/dL    Alkaline Phosphatase 103 40 - 150 U/L    AST 19 0 - 45 U/L    ALT 29 0 - 50 U/L    Protein Total 7.6 6.8 - 8.8 g/dL    Albumin 3.3 (L) 3.4 - 5.0 g/dL    Bilirubin Total 0.4 0.2 - 1.3 mg/dL    GFR Estimate >90 >60 mL/min/1.73m2   CBC with platelets differential    Narrative    The following orders were created for panel order CBC with platelets differential.  Procedure                               Abnormality         Status                     ---------                               -----------         ------                     CBC with platelets and d...[[id number]]  Abnormal            Final result               Manual Differential[[id number]]                              Final result                 Please view results for these tests on the individual orders.   CRP inflammation   Result Value Ref Range    CRP Inflammation 2.9 0.0 - 8.0 mg/L   Erythrocyte sedimentation rate auto   Result Value Ref Range    Erythrocyte Sedimentation Rate 29 0 - 30 mm/hr   CBC with platelets and differential   Result Value Ref Range    WBC Count 5.0 4.0 - 11.0 10e3/uL    RBC Count 3.38  (L) 3.80 - 5.20 10e6/uL    Hemoglobin 11.5 (L) 11.7 - 15.7 g/dL    Hematocrit 34.9 (L) 35.0 - 47.0 %     (H) 78 - 100 fL    MCH 34.0 (H) 26.5 - 33.0 pg    MCHC 33.0 31.5 - 36.5 g/dL    RDW 13.2 10.0 - 15.0 %    Platelet Count 145 (L) 150 - 450 10e3/uL   Manual Differential   Result Value Ref Range    % Neutrophils 43 %    % Lymphocytes 54 %    % Monocytes 1 %    % Eosinophils 2 %    % Basophils 0 %    Absolute Neutrophils 2.2 1.6 - 8.3 10e3/uL    Absolute Lymphocytes 2.7 0.8 - 5.3 10e3/uL    Absolute Monocytes 0.1 0.0 - 1.3 10e3/uL    Absolute Eosinophils 0.1 0.0 - 0.7 10e3/uL    Absolute Basophils 0.0 0.0 - 0.2 10e3/uL    RBC Morphology Confirmed RBC Indices     Platelet Assessment  Automated Count Confirmed. Platelet morphology is normal.     Automated Count Confirmed. Platelet morphology is normal.   UA with Microscopic reflex to Culture    Specimen: Urine, Midstream   Result Value Ref Range    Color Urine Yellow Colorless, Straw, Light Yellow, Yellow    Appearance Urine Clear Clear    Glucose Urine Negative Negative mg/dL    Bilirubin Urine Negative Negative    Ketones Urine Negative Negative mg/dL    Specific Gravity Urine 1.010 1.003 - 1.035    Blood Urine Negative Negative    pH Urine 6.0 5.0 - 7.0    Protein Albumin Urine Negative Negative mg/dL    Urobilinogen Urine Normal Normal, 2.0 mg/dL    Nitrite Urine Negative Negative    Leukocyte Esterase Urine Negative Negative    Mucus Urine Present (A) None Seen /LPF    RBC Urine <1 <=2 /HPF    WBC Urine 3 <=5 /HPF    Squamous Epithelials Urine 2 (H) <=1 /HPF    Narrative    Urine Culture not indicated       Medications - No data to display    Assessments & Plan (with Medical Decision Making)   Patient presents with bilateral erythematous rash to the medial lower leg area.  This has a strict demarcation to the line of where her socks were.  This does make me suspect contact dermatitis with this sharp demarcation to an area where her socks were as it has  not spread down her feet.  There is no sign of life-threatening rash such as SJS or TEN as there is no mucous membrane involvement or Nikolsky sign.  There is no scaling noted on this rash.  It is not purpura or petechiae as it does lorrie, making vasculitis less likely.  There is no vesicles.  There is no involvement of the palms or soles.  There is no other areas of rash or signs of allergic reaction otherwise.  There is no bullae or crepitus.  Did also consider cellulitis or erysipelas however this would be odd to be bilateral and she has no infectious symptoms.  No leg swelling.  This is not nodular as we did consider erythema nodosum with her Crohn's disease but felt this was less likely.  Did obtain laboratory studies as well as urinalysis to check for systemic signs.  Patient's sed rate, CRP, and white blood cell count are within normal limits making infectious etiology less likely.  Platelets are slightly low at 145 however this is her baseline per record review.  CMP is largely unremarkable with normal creatinine and liver function.  Urinalysis is unremarkable.  She is overall well-appearing and in no acute distress.  She is afebrile.  Discussed with her that I do think this is most likely a contact dermatitis but I do not know the exact etiology.  Will prescribe triamcinolone ointment to use on the rash 2 times a day.  Advised the importance of close follow-up with her primary care provider who could refer her to dermatology if not improving.  She was given strict return precautions to the emergency department.  She voiced understanding was comfortable with this plan as well as discharge.  She was discharged in stable condition.        I have reviewed the nursing notes.    I have reviewed the findings, diagnosis, plan and need for follow up with the patient.       Discharge Medication List as of 7/19/2022  7:37 AM      START taking these medications    Details   triamcinolone (KENALOG) 0.1 % external  ointment Apply topically 2 times daily for 14 daysDisp-30 g, [id number]             Final diagnoses:   Rash and nonspecific skin eruption       7/19/2022   [facility] EMERGENCY DEPT     Adrianna Jones MD  07/21/22 [id number]

## 2022-07-19 NOTE — DISCHARGE INSTRUCTIONS
Please make an appointment to follow up with Your Primary Care Provider as scheduled.     Use the triamcinolone ointment on the rash area 2 times a day. Can use this for up to 2 weeks.     Return to the ER for any new or worsening concerns.

## 2022-07-19 NOTE — LETTER
July 19, 2022      To Whom It May Concern:      Mackenzie Worrell was seen in our Emergency Department today, 07/19/22.  I expect her condition to improve over the next 3 days.  She may return to work/school when improved.    Sincerely,        Adrianna Jones MD

## 2022-07-22 ENCOUNTER — OFFICE VISIT (OUTPATIENT)
Dept: INTERNAL MEDICINE | Facility: CLINIC | Age: 52
End: 2022-07-22
Payer: COMMERCIAL

## 2022-07-22 VITALS
SYSTOLIC BLOOD PRESSURE: 138 MMHG | DIASTOLIC BLOOD PRESSURE: 90 MMHG | BODY MASS INDEX: 36.85 KG/M2 | HEART RATE: 123 BPM | TEMPERATURE: 96 F | OXYGEN SATURATION: 97 % | WEIGHT: 235.25 LBS

## 2022-07-22 DIAGNOSIS — F33.1 MODERATE EPISODE OF RECURRENT MAJOR DEPRESSIVE DISORDER (H): Primary | ICD-10-CM

## 2022-07-22 DIAGNOSIS — Z11.4 SCREENING FOR HIV (HUMAN IMMUNODEFICIENCY VIRUS): ICD-10-CM

## 2022-07-22 DIAGNOSIS — Z12.31 VISIT FOR SCREENING MAMMOGRAM: ICD-10-CM

## 2022-07-22 DIAGNOSIS — Z13.220 SCREENING FOR HYPERLIPIDEMIA: ICD-10-CM

## 2022-07-22 DIAGNOSIS — C7A.098: ICD-10-CM

## 2022-07-22 DIAGNOSIS — Z90.49 S/P COLECTOMY: ICD-10-CM

## 2022-07-22 DIAGNOSIS — K50.00 CROHN'S DISEASE OF SMALL INTESTINE WITHOUT COMPLICATION (H): ICD-10-CM

## 2022-07-22 LAB
CHOLEST SERPL-MCNC: 218 MG/DL
FASTING STATUS PATIENT QL REPORTED: NO
HDLC SERPL-MCNC: 98 MG/DL
LDLC SERPL CALC-MCNC: 75 MG/DL
NONHDLC SERPL-MCNC: 120 MG/DL
TRIGL SERPL-MCNC: 223 MG/DL

## 2022-07-22 PROCEDURE — 80061 LIPID PANEL: CPT | Performed by: INTERNAL MEDICINE

## 2022-07-22 PROCEDURE — 87389 HIV-1 AG W/HIV-1&-2 AB AG IA: CPT | Performed by: INTERNAL MEDICINE

## 2022-07-22 PROCEDURE — 36415 COLL VENOUS BLD VENIPUNCTURE: CPT | Performed by: INTERNAL MEDICINE

## 2022-07-22 PROCEDURE — 99214 OFFICE O/P EST MOD 30 MIN: CPT | Performed by: INTERNAL MEDICINE

## 2022-07-22 RX ORDER — CALCIUM GLUCONATE 94 MG/ML
2 INJECTION, SOLUTION INTRAVENOUS
COMMUNITY

## 2022-07-22 RX ORDER — NORTRIPTYLINE HCL 25 MG
25 CAPSULE ORAL
COMMUNITY
Start: 2022-03-07 | End: 2023-03-07

## 2022-07-22 RX ORDER — BUPROPION HYDROCHLORIDE 150 MG/1
150 TABLET ORAL EVERY MORNING
Qty: 30 TABLET | Refills: 0 | Status: SHIPPED | OUTPATIENT
Start: 2022-07-22 | End: 2022-08-14

## 2022-07-22 RX ORDER — LORATADINE 10 MG/1
10 TABLET ORAL
COMMUNITY

## 2022-07-22 RX ORDER — CALCIUM CARBONATE 500(1250)
1 TABLET,CHEWABLE ORAL
COMMUNITY

## 2022-07-22 RX ORDER — PANCRELIPASE 36000; 180000; 114000 [USP'U]/1; [USP'U]/1; [USP'U]/1
CAPSULE, DELAYED RELEASE PELLETS ORAL
COMMUNITY
Start: 2022-06-27

## 2022-07-22 ASSESSMENT — ASTHMA QUESTIONNAIRES
ACT_TOTALSCORE: 22
QUESTION_2 LAST FOUR WEEKS HOW OFTEN HAVE YOU HAD SHORTNESS OF BREATH: ONCE OR TWICE A WEEK
HOSPITALIZATION_OVERNIGHT_LAST_YEAR_TOTAL: TWO
ACT_TOTALSCORE: 22
QUESTION_4 LAST FOUR WEEKS HOW OFTEN HAVE YOU USED YOUR RESCUE INHALER OR NEBULIZER MEDICATION (SUCH AS ALBUTEROL): ONCE A WEEK OR LESS
EMERGENCY_ROOM_LAST_YEAR_TOTAL: THREE OR MORE
QUESTION_1 LAST FOUR WEEKS HOW MUCH OF THE TIME DID YOUR ASTHMA KEEP YOU FROM GETTING AS MUCH DONE AT WORK, SCHOOL OR AT HOME: NONE OF THE TIME
QUESTION_3 LAST FOUR WEEKS HOW OFTEN DID YOUR ASTHMA SYMPTOMS (WHEEZING, COUGHING, SHORTNESS OF BREATH, CHEST TIGHTNESS OR PAIN) WAKE YOU UP AT NIGHT OR EARLIER THAN USUAL IN THE MORNING: NOT AT ALL
QUESTION_5 LAST FOUR WEEKS HOW WOULD YOU RATE YOUR ASTHMA CONTROL: WELL CONTROLLED

## 2022-07-22 ASSESSMENT — PATIENT HEALTH QUESTIONNAIRE - PHQ9
SUM OF ALL RESPONSES TO PHQ QUESTIONS 1-9: 19
SUM OF ALL RESPONSES TO PHQ QUESTIONS 1-9: 19
10. IF YOU CHECKED OFF ANY PROBLEMS, HOW DIFFICULT HAVE THESE PROBLEMS MADE IT FOR YOU TO DO YOUR WORK, TAKE CARE OF THINGS AT HOME, OR GET ALONG WITH OTHER PEOPLE: EXTREMELY DIFFICULT

## 2022-07-22 ASSESSMENT — PAIN SCALES - GENERAL: PAINLEVEL: NO PAIN (0)

## 2022-07-22 NOTE — PROGRESS NOTES
EMR reviewed including:             Complaint, History of Chief Complaint, Corresponding Review of Systems, and Complaint Specific Physical Examination.    #1   Depression  Seeing a counselor.  Told by counselor to get medicine  Denies specific suicidal ideation.  Notes that she is sleeping fairly well.  Eating well.  Has been working intermittently.  Has recently been dealing with pancreatic cancer.   Prognosis very positive.        Exam:   PSYCH: The patient appears grossly appropriate and actually quite upbeat.  Maintains good eye contact, does not have any jittery or atypical motion. Displays appropriate affect.      #2   Crohn's disease  History of partial small bowel resection.  No significant diarrhea at this time.  Denies melena or hematochezia.  Denies chronic abdominal pain        Exam:   LUNGS: clear bilaterally, airflow is brisk, no intercostal retraction or stridor is noted. No coughing is noted during visit.   HEART:  regular without rubs, clicks, gallops, or murmurs. PMI is nondisplaced. Upstrokes are brisk. S1,S2 are heard.   GI: Abdomen is soft, without rebound, guarding or tenderness. Bowel sounds are appropriate. No renal bruits are heard.        Patient has been interviewed, applicable history and applied review of systems have been performed.    Vital Signs:   BP (!) 138/90   Pulse (!) 123   Temp (!) 96  F (35.6  C) (Temporal)   Wt 106.7 kg (235 lb 4 oz)   SpO2 97%   BMI 36.85 kg/m        Decision Making    Problem and Complexity     1. Moderate episode of recurrent major depressive disorder (H)  Patient is already on Pamelor.  Using this for irritable bowel syndrome.  Start an SRI  Continue counseling  Adjust medication dosage accordingly.  Follow-up with oncology  - buPROPion (WELLBUTRIN XL) 150 MG 24 hr tablet; Take 1 tablet (150 mg) by mouth every morning  Dispense: 30 tablet; Refill: 0    2. Malignant carcinoid tumor of pancreas (H)  Follow-up with oncology    3. Screening for  hyperlipidemia  Screening done  - Lipid panel reflex to direct LDL Non-fasting; Future  - Lipid panel reflex to direct LDL Non-fasting    4. Visit for screening mammogram  Mammogram ordered  - MA SCREENING DIGITAL BILAT - Future  (s+30); Future    5. S/P colectomy  Stable    6. Screening for HIV (human immunodeficiency virus)  Screening  - HIV Antigen Antibody Combo; Future  - HIV Antigen Antibody Combo    7. Crohn's disease of small intestine without complication (H)  Follow-up with gastroenterologist                                FOLLOW UP   I have asked the patient to make an appointment for followup with me in 1 month for follow-up of her depression.        I have carefully explained the diagnosis and treatment options to the patient.  The patient has displayed an understanding of the above, and all subsequent questions were answered.      DO TAMARA Jean-Baptiste    Portions of this note were produced using [employer]  Although every attempt at real-time proof reading has been made, occasional grammar/syntax errors may have been missed.    Answers for HPI/ROS submitted by the patient on 7/22/2022  If you checked off any problems, how difficult have these problems made it for you to do your work, take care of things at home, or get along with other people?: Extremely difficult  PHQ9 TOTAL SCORE: 19  What is the reason for your visit today? : Depression and anxiety, re check on rash  How many servings of fruits and vegetables do you eat daily?: 2-3  On average, how many sweetened beverages do you drink each day (Examples: soda, juice, sweet tea, etc.  Do NOT count diet or artificially sweetened beverages)?: 0  How many minutes a day do you exercise enough to make your heart beat faster?: 10 to 19  How many days a week do you exercise enough to make your heart beat faster?: 3 or less  How many days per week do you miss taking your medication?: 0

## 2022-07-25 DIAGNOSIS — F33.1 MODERATE EPISODE OF RECURRENT MAJOR DEPRESSIVE DISORDER (H): ICD-10-CM

## 2022-07-25 LAB — HIV 1+2 AB+HIV1 P24 AG SERPL QL IA: NONREACTIVE

## 2022-07-26 RX ORDER — BUPROPION HYDROCHLORIDE 150 MG/1
150 TABLET ORAL EVERY MORNING
Qty: 30 TABLET | Refills: 0 | OUTPATIENT
Start: 2022-07-26

## 2022-07-26 NOTE — TELEPHONE ENCOUNTER
Prescription was sent 7/22/22 for #30 with 0 refills.  Pharmacy notified via E-Prescribe refusal.    ENEDELIA ClaytonN, RN

## 2022-08-14 ENCOUNTER — MYC REFILL (OUTPATIENT)
Dept: INTERNAL MEDICINE | Facility: CLINIC | Age: 52
End: 2022-08-14

## 2022-08-14 DIAGNOSIS — F33.1 MODERATE EPISODE OF RECURRENT MAJOR DEPRESSIVE DISORDER (H): ICD-10-CM

## 2022-08-16 RX ORDER — BUPROPION HYDROCHLORIDE 150 MG/1
150 TABLET ORAL EVERY MORNING
Qty: 30 TABLET | Refills: 0 | Status: SHIPPED | OUTPATIENT
Start: 2022-08-16 | End: 2022-09-15

## 2022-08-16 NOTE — TELEPHONE ENCOUNTER
Pending Prescriptions:                       Disp   Refills    buPROPion (WELLBUTRIN XL) 150 MG 24 hr tab*30 tab*0        Sig: Take 1 tablet (150 mg) by mouth every morning    Routing refill request to provider for review/approval because:  PHQ9 failed RN protocol    Kaye Posey RN

## 2022-09-15 ENCOUNTER — MYC REFILL (OUTPATIENT)
Dept: INTERNAL MEDICINE | Facility: CLINIC | Age: 52
End: 2022-09-15

## 2022-09-15 DIAGNOSIS — F33.1 MODERATE EPISODE OF RECURRENT MAJOR DEPRESSIVE DISORDER (H): ICD-10-CM

## 2022-09-15 RX ORDER — BUPROPION HYDROCHLORIDE 150 MG/1
150 TABLET ORAL EVERY MORNING
Qty: 30 TABLET | Refills: 0 | Status: SHIPPED | OUTPATIENT
Start: 2022-09-15 | End: 2022-10-21

## 2022-10-09 ENCOUNTER — HEALTH MAINTENANCE LETTER (OUTPATIENT)
Age: 52
End: 2022-10-09

## 2022-10-18 DIAGNOSIS — F33.1 MODERATE EPISODE OF RECURRENT MAJOR DEPRESSIVE DISORDER (H): ICD-10-CM

## 2022-10-21 RX ORDER — BUPROPION HYDROCHLORIDE 150 MG/1
150 TABLET ORAL EVERY MORNING
Qty: 30 TABLET | Refills: 0 | Status: SHIPPED | OUTPATIENT
Start: 2022-10-21 | End: 2022-11-18

## 2022-10-21 NOTE — TELEPHONE ENCOUNTER
Patient calling to follow up on her refill request as she took her last tablet today. Melody Coley LPN

## 2022-10-21 NOTE — TELEPHONE ENCOUNTER
Routing refill request to provider for review/approval because:  PHQ-9. Patient took last medication today.       Jana Dyson RN, BSN

## 2022-11-16 DIAGNOSIS — F33.1 MODERATE EPISODE OF RECURRENT MAJOR DEPRESSIVE DISORDER (H): ICD-10-CM

## 2022-11-18 RX ORDER — BUPROPION HYDROCHLORIDE 150 MG/1
150 TABLET ORAL EVERY MORNING
Qty: 30 TABLET | Refills: 0 | Status: SHIPPED | OUTPATIENT
Start: 2022-11-18 | End: 2022-12-20

## 2022-11-18 NOTE — TELEPHONE ENCOUNTER
"Requested Prescriptions   Pending Prescriptions Disp Refills    buPROPion (WELLBUTRIN XL) 150 MG 24 hr tablet [Pharmacy Med Name: BUPROPION HCL ER (XL) 150MG TB24] 30 tablet 0     Sig: TAKE 1 TABLET (150 MG) BY MOUTH EVERY MORNING       SSRIs Protocol Failed - 11/16/2022  1:33 PM        Failed - PHQ-9 score less than 5 in past 6 months     Please review last PHQ-9 score.           Passed - Medication is Bupropion     If the medication is Bupropion (Wellbutrin), and the patient is taking for smoking cessation; OK to refill.          Passed - Medication is active on med list        Passed - Patient is age 18 or older        Passed - No active pregnancy on record        Passed - No positive pregnancy test in last 12 months        Passed - Recent (6 mo) or future (30 days) visit within the authorizing provider's specialty     Patient had office visit in the last 6 months or has a visit in the next 30 days with authorizing provider or within the authorizing provider's specialty.  See \"Patient Info\" tab in inbasket, or \"Choose Columns\" in Meds & Orders section of the refill encounter.                 ENEDELIA ClaytonN, RN     "

## 2022-11-26 ENCOUNTER — HEALTH MAINTENANCE LETTER (OUTPATIENT)
Age: 52
End: 2022-11-26

## 2022-12-17 DIAGNOSIS — F33.1 MODERATE EPISODE OF RECURRENT MAJOR DEPRESSIVE DISORDER (H): ICD-10-CM

## 2022-12-20 RX ORDER — BUPROPION HYDROCHLORIDE 150 MG/1
150 TABLET ORAL EVERY MORNING
Qty: 30 TABLET | Refills: 0 | Status: SHIPPED | OUTPATIENT
Start: 2022-12-20 | End: 2023-01-17

## 2022-12-20 NOTE — TELEPHONE ENCOUNTER
Routing refill request to provider for review/approval because:    Requested Prescriptions   Pending Prescriptions Disp Refills    buPROPion (WELLBUTRIN XL) 150 MG 24 hr tablet [Pharmacy Med Name: BUPROPION HCL ER (XL) 150MG TB24] 30 tablet 0     Sig: TAKE 1 TABLET (150 MG) BY MOUTH EVERY MORNING       SSRIs Protocol Failed - 12/17/2022 12:49 PM        Failed - PHQ-9 score less than 5 in past 6 months     Please review last PHQ-9 score.

## 2023-01-16 DIAGNOSIS — F33.1 MODERATE EPISODE OF RECURRENT MAJOR DEPRESSIVE DISORDER (H): ICD-10-CM

## 2023-01-17 RX ORDER — BUPROPION HYDROCHLORIDE 150 MG/1
150 TABLET ORAL EVERY MORNING
Qty: 30 TABLET | Refills: 0 | Status: SHIPPED | OUTPATIENT
Start: 2023-01-17

## 2023-01-17 NOTE — TELEPHONE ENCOUNTER
"Requested Prescriptions   Pending Prescriptions Disp Refills    buPROPion (WELLBUTRIN XL) 150 MG 24 hr tablet [Pharmacy Med Name: BUPROPION HCL ER (XL) 150MG TB24] 30 tablet 0     Sig: TAKE 1 TABLET (150 MG) BY MOUTH EVERY MORNING       SSRIs Protocol Failed - 1/16/2023  9:05 AM        Failed - PHQ-9 score less than 5 in past 6 months     Please review last PHQ-9 score.           Passed - Medication is Bupropion     If the medication is Bupropion (Wellbutrin), and the patient is taking for smoking cessation; OK to refill.          Passed - Medication is active on med list        Passed - Patient is age 18 or older        Passed - No active pregnancy on record        Passed - No positive pregnancy test in last 12 months        Passed - Recent (6 mo) or future (30 days) visit within the authorizing provider's specialty     Patient had office visit in the last 6 months or has a visit in the next 30 days with authorizing provider or within the authorizing provider's specialty.  See \"Patient Info\" tab in inbasket, or \"Choose Columns\" in Meds & Orders section of the refill encounter.                 "

## 2023-02-01 ENCOUNTER — TRANSFERRED RECORDS (OUTPATIENT)
Dept: MULTI SPECIALTY CLINIC | Facility: CLINIC | Age: 53
End: 2023-02-01

## 2023-05-11 ENCOUNTER — TELEPHONE (OUTPATIENT)
Dept: INTERNAL MEDICINE | Facility: CLINIC | Age: 53
End: 2023-05-11
Payer: COMMERCIAL

## 2023-05-11 NOTE — TELEPHONE ENCOUNTER
Please abstract the following data from this visit with this patient into the appropriate field in Epic:    Tests that can be patient reported without a hard copy:        Other Tests found in the patient's chart through Chart Review/Care Everywhere:    Mammogram done by this group [facility] on this date: 2/1/2023    Note to [employer]: If this section is blank, no results were found via Chart Review/Care Everywhere.

## 2024-01-02 ENCOUNTER — PATIENT OUTREACH (OUTPATIENT)
Dept: CARE COORDINATION | Facility: CLINIC | Age: 54
End: 2024-01-02
Payer: COMMERCIAL

## 2024-01-06 ENCOUNTER — HEALTH MAINTENANCE LETTER (OUTPATIENT)
Age: 54
End: 2024-01-06

## 2024-05-02 ENCOUNTER — MYC MEDICAL ADVICE (OUTPATIENT)
Dept: FAMILY MEDICINE | Facility: CLINIC | Age: 54
End: 2024-05-02
Payer: COMMERCIAL

## 2024-05-02 ENCOUNTER — TELEPHONE (OUTPATIENT)
Dept: INTERNAL MEDICINE | Facility: CLINIC | Age: 54
End: 2024-05-02

## 2024-05-02 NOTE — TELEPHONE ENCOUNTER
Patient Quality Outreach    Patient is due for the following:   Asthma  -  ACT needed  Depression  -  PHQ-9 needed  Physical Preventive Adult Physical      Topic Date Due    Zoster (Shingles) Vaccine (1 of 2) Never done    Hepatitis B Vaccine (1 of 3 - 19+ 3-dose series) Never done    COVID-19 Vaccine (3 - Pfizer risk series) 02/05/2022       Next Steps:       Type of outreach:    Sent iMusicTweet message.    Next Steps:  Reach out within 90 days via iMusicTweet.    Max number of attempts reached: No. Will try again in 90 days if patient still on fail list.    Questions for provider review:    None           Alia Palmer LPN

## 2024-07-24 NOTE — TELEPHONE ENCOUNTER
Patient Quality Outreach    Patient is due for the following:   Asthma  -  ACT needed  Depression  -  PHQ-9 needed  Physical Preventive Adult Physical    Next Steps:       Type of outreach:    Sent Skydeck message.    Next Steps:  Reach out within 90 days via Skydeck.    Max number of attempts reached: No. Will try again in 90 days if patient still on fail list.    Questions for provider review:    None           Alia Palmer LPN

## 2024-11-15 ENCOUNTER — LAB (OUTPATIENT)
Dept: LAB | Facility: CLINIC | Age: 54
End: 2024-11-15
Payer: COMMERCIAL

## 2024-11-15 DIAGNOSIS — K50.818 CROHN'S DISEASE OF BOTH SMALL AND LG INT W OTH COMPLICATION (H): ICD-10-CM

## 2025-01-25 ENCOUNTER — HEALTH MAINTENANCE LETTER (OUTPATIENT)
Age: 55
End: 2025-01-25

## 2025-04-12 ENCOUNTER — HEALTH MAINTENANCE LETTER (OUTPATIENT)
Age: 55
End: 2025-04-12

## (undated) DEVICE — DRAPE HAND

## (undated) DEVICE — BNDG ELASTIC 3"X5YDS UNSTERILE 6611-30

## (undated) DEVICE — GOWN IMPERVIOUS BREATHABLE 2XL/XLONG

## (undated) DEVICE — PACK HAND WRIST FOREARM CUSTOM

## (undated) DEVICE — PREP CHLORAPREP 26ML TINTED ORANGE  260815

## (undated) DEVICE — SOL NACL 0.9% IRRIG 1000ML BOTTLE 07138-09

## (undated) DEVICE — GLOVE PROTEXIS W/NEU-THERA 8.5  2D73TE85

## (undated) DEVICE — SOL ISOPROPYL ALCOHOL USP 70% 16OZ  NDC10565-002-16 D0022

## (undated) DEVICE — BNDG ESMARK 4" STERILE

## (undated) DEVICE — DRSG GAUZE 4X4" TRAY

## (undated) DEVICE — Device

## (undated) DEVICE — DRSG XEROFORM 1X8"

## (undated) DEVICE — CAST PADDING 3" WEBRIL UNSTERILE

## (undated) DEVICE — SOL WATER IRRIG 1000ML BOTTLE 07139-09

## (undated) DEVICE — SU ETHILON 4-0 PS-2 18" 1667G

## (undated) DEVICE — GLOVE PROTEXIS BLUE W/NEU-THERA 8.0  2D73EB80

## (undated) DEVICE — DRSG GAUZE 4X4" 3033

## (undated) DEVICE — BNDG KLING 3" 2232

## (undated) RX ORDER — FENTANYL CITRATE 50 UG/ML
INJECTION, SOLUTION INTRAMUSCULAR; INTRAVENOUS
Status: DISPENSED
Start: 2018-04-09

## (undated) RX ORDER — LIDOCAINE HYDROCHLORIDE 10 MG/ML
INJECTION, SOLUTION EPIDURAL; INFILTRATION; INTRACAUDAL; PERINEURAL
Status: DISPENSED
Start: 2018-04-09